# Patient Record
Sex: FEMALE | Race: WHITE | NOT HISPANIC OR LATINO | URBAN - METROPOLITAN AREA
[De-identification: names, ages, dates, MRNs, and addresses within clinical notes are randomized per-mention and may not be internally consistent; named-entity substitution may affect disease eponyms.]

---

## 2017-02-02 ENCOUNTER — OFFICE VISIT - RIVER FALLS (OUTPATIENT)
Dept: FAMILY MEDICINE | Facility: CLINIC | Age: 77
End: 2017-02-02

## 2017-02-02 ASSESSMENT — MIFFLIN-ST. JEOR: SCORE: 1365.84

## 2017-03-02 ENCOUNTER — OFFICE VISIT - RIVER FALLS (OUTPATIENT)
Dept: FAMILY MEDICINE | Facility: CLINIC | Age: 77
End: 2017-03-02

## 2017-04-28 ENCOUNTER — OFFICE VISIT - RIVER FALLS (OUTPATIENT)
Dept: FAMILY MEDICINE | Facility: CLINIC | Age: 77
End: 2017-04-28

## 2017-04-28 ENCOUNTER — COMMUNICATION - RIVER FALLS (OUTPATIENT)
Dept: FAMILY MEDICINE | Facility: CLINIC | Age: 77
End: 2017-04-28

## 2017-04-28 ASSESSMENT — MIFFLIN-ST. JEOR: SCORE: 1392.15

## 2017-04-29 LAB
CREAT SERPL-MCNC: 0.7 MG/DL (ref 0.6–0.93)
GLUCOSE BLD-MCNC: 184 MG/DL (ref 65–99)
HBA1C MFR BLD: 7.2 %

## 2017-05-02 ENCOUNTER — OFFICE VISIT - RIVER FALLS (OUTPATIENT)
Dept: FAMILY MEDICINE | Facility: CLINIC | Age: 77
End: 2017-05-02

## 2017-05-02 ASSESSMENT — MIFFLIN-ST. JEOR: SCORE: 1375.14

## 2017-06-01 ENCOUNTER — OFFICE VISIT - RIVER FALLS (OUTPATIENT)
Dept: FAMILY MEDICINE | Facility: CLINIC | Age: 77
End: 2017-06-01

## 2017-06-14 ENCOUNTER — COMMUNICATION - RIVER FALLS (OUTPATIENT)
Dept: FAMILY MEDICINE | Facility: CLINIC | Age: 77
End: 2017-06-14

## 2017-06-14 ENCOUNTER — OFFICE VISIT - RIVER FALLS (OUTPATIENT)
Dept: FAMILY MEDICINE | Facility: CLINIC | Age: 77
End: 2017-06-14

## 2017-06-14 ASSESSMENT — MIFFLIN-ST. JEOR: SCORE: 1392.15

## 2017-06-15 LAB
CREAT SERPL-MCNC: 0.68 MG/DL (ref 0.6–0.93)
GLUCOSE BLD-MCNC: 109 MG/DL (ref 65–99)

## 2017-09-12 ENCOUNTER — OFFICE VISIT - RIVER FALLS (OUTPATIENT)
Dept: FAMILY MEDICINE | Facility: CLINIC | Age: 77
End: 2017-09-12

## 2017-09-12 ASSESSMENT — MIFFLIN-ST. JEOR: SCORE: 1366.75

## 2017-12-11 ENCOUNTER — OFFICE VISIT - RIVER FALLS (OUTPATIENT)
Dept: FAMILY MEDICINE | Facility: CLINIC | Age: 77
End: 2017-12-11

## 2017-12-11 ASSESSMENT — MIFFLIN-ST. JEOR: SCORE: 1351.32

## 2017-12-12 LAB
CHOLEST SERPL-MCNC: 196 MG/DL
CHOLEST/HDLC SERPL: 3.8 {RATIO}
CREAT SERPL-MCNC: 0.68 MG/DL (ref 0.6–0.93)
GLUCOSE BLD-MCNC: 172 MG/DL (ref 65–99)
HBA1C MFR BLD: 7.2 %
HDLC SERPL-MCNC: 52 MG/DL
LDLC SERPL CALC-MCNC: 112 MG/DL
NONHDLC SERPL-MCNC: 144 MG/DL
TRIGL SERPL-MCNC: 202 MG/DL

## 2018-03-05 ENCOUNTER — OFFICE VISIT - RIVER FALLS (OUTPATIENT)
Dept: FAMILY MEDICINE | Facility: CLINIC | Age: 78
End: 2018-03-05

## 2018-03-05 ASSESSMENT — MIFFLIN-ST. JEOR: SCORE: 1360.4

## 2018-03-12 ENCOUNTER — OFFICE VISIT - RIVER FALLS (OUTPATIENT)
Dept: FAMILY MEDICINE | Facility: CLINIC | Age: 78
End: 2018-03-12

## 2018-03-12 ASSESSMENT — MIFFLIN-ST. JEOR: SCORE: 1367.65

## 2018-03-26 ENCOUNTER — OFFICE VISIT - RIVER FALLS (OUTPATIENT)
Dept: FAMILY MEDICINE | Facility: CLINIC | Age: 78
End: 2018-03-26

## 2018-03-26 ASSESSMENT — MIFFLIN-ST. JEOR: SCORE: 1346.79

## 2018-03-27 LAB
CREAT SERPL-MCNC: 0.7 MG/DL (ref 0.6–0.93)
GLUCOSE BLD-MCNC: 123 MG/DL (ref 65–99)

## 2018-04-13 ENCOUNTER — OFFICE VISIT - RIVER FALLS (OUTPATIENT)
Dept: FAMILY MEDICINE | Facility: CLINIC | Age: 78
End: 2018-04-13

## 2018-04-13 ASSESSMENT — MIFFLIN-ST. JEOR: SCORE: 1375.36

## 2018-06-06 ENCOUNTER — AMBULATORY - RIVER FALLS (OUTPATIENT)
Dept: FAMILY MEDICINE | Facility: CLINIC | Age: 78
End: 2018-06-06

## 2018-06-07 LAB
CHOLEST SERPL-MCNC: 158 MG/DL
CHOLEST/HDLC SERPL: 3.8 {RATIO}
HBA1C MFR BLD: 7.6 %
HDLC SERPL-MCNC: 42 MG/DL
LDLC SERPL CALC-MCNC: 89 MG/DL
NONHDLC SERPL-MCNC: 116 MG/DL
TRIGL SERPL-MCNC: 177 MG/DL

## 2018-06-15 ENCOUNTER — OFFICE VISIT - RIVER FALLS (OUTPATIENT)
Dept: FAMILY MEDICINE | Facility: CLINIC | Age: 78
End: 2018-06-15

## 2018-06-15 ASSESSMENT — MIFFLIN-ST. JEOR: SCORE: 1368.1

## 2018-06-16 LAB
CREAT SERPL-MCNC: 0.7 MG/DL (ref 0.6–0.93)
GLUCOSE BLD-MCNC: 143 MG/DL (ref 65–99)

## 2018-09-11 ENCOUNTER — AMBULATORY - RIVER FALLS (OUTPATIENT)
Dept: FAMILY MEDICINE | Facility: CLINIC | Age: 78
End: 2018-09-11

## 2018-09-12 LAB
CREAT SERPL-MCNC: 0.69 MG/DL (ref 0.6–0.93)
GLUCOSE BLD-MCNC: 116 MG/DL (ref 65–99)
HBA1C MFR BLD: 6.2 %

## 2018-09-18 ENCOUNTER — OFFICE VISIT - RIVER FALLS (OUTPATIENT)
Dept: FAMILY MEDICINE | Facility: CLINIC | Age: 78
End: 2018-09-18

## 2018-09-18 ASSESSMENT — MIFFLIN-ST. JEOR: SCORE: 1337.26

## 2018-10-18 ENCOUNTER — OFFICE VISIT - RIVER FALLS (OUTPATIENT)
Dept: FAMILY MEDICINE | Facility: CLINIC | Age: 78
End: 2018-10-18

## 2018-10-18 ASSESSMENT — MIFFLIN-ST. JEOR: SCORE: 1330

## 2019-01-15 ENCOUNTER — OFFICE VISIT - RIVER FALLS (OUTPATIENT)
Dept: FAMILY MEDICINE | Facility: CLINIC | Age: 79
End: 2019-01-15

## 2019-01-15 ASSESSMENT — MIFFLIN-ST. JEOR: SCORE: 1338.16

## 2019-01-16 LAB
A/G RATIO - HISTORICAL: 1.5 (ref 1–2.5)
ALBUMIN SERPL-MCNC: 4 GM/DL (ref 3.6–5.1)
ALP SERPL-CCNC: 66 UNIT/L (ref 33–130)
ALT SERPL W P-5'-P-CCNC: 29 UNIT/L (ref 6–29)
AST SERPL W P-5'-P-CCNC: 33 UNIT/L (ref 10–35)
BASOPHILS # BLD MANUAL: 32 10*3/UL (ref 0–200)
BASOPHILS NFR BLD MANUAL: 0.5 %
BILIRUB SERPL-MCNC: 0.8 MG/DL (ref 0.2–1.2)
BUN SERPL-MCNC: 21 MG/DL (ref 7–25)
BUN/CREAT RATIO - HISTORICAL: ABNORMAL (ref 6–22)
CALCIUM SERPL-MCNC: 9.4 MG/DL (ref 8.6–10.4)
CHLORIDE BLD-SCNC: 104 MMOL/L (ref 98–110)
CO2 SERPL-SCNC: 27 MMOL/L (ref 20–32)
CREAT SERPL-MCNC: 0.76 MG/DL (ref 0.6–0.93)
EGFRCR SERPLBLD CKD-EPI 2021: 76 ML/MIN/1.73M2
EOSINOPHIL # BLD MANUAL: 221 10*3/UL (ref 15–500)
EOSINOPHIL NFR BLD MANUAL: 3.5 %
ERYTHROCYTE [DISTWIDTH] IN BLOOD BY AUTOMATED COUNT: 12.2 % (ref 11–15)
GLOBULIN: 2.7 (ref 1.9–3.7)
GLUCOSE BLD-MCNC: 130 MG/DL (ref 65–99)
HBA1C MFR BLD: 6.8 %
HCT VFR BLD AUTO: 40.3 % (ref 35–45)
HGB BLD-MCNC: 13.5 GM/DL (ref 11.7–15.5)
LYMPHOCYTES # BLD MANUAL: 1909 10*3/UL (ref 850–3900)
LYMPHOCYTES NFR BLD MANUAL: 30.3 %
MCH RBC QN AUTO: 31.5 PG (ref 27–33)
MCHC RBC AUTO-ENTMCNC: 33.5 GM/DL (ref 32–36)
MCV RBC AUTO: 94.2 FL (ref 80–100)
MONOCYTES # BLD MANUAL: 548 10*3/UL (ref 200–950)
MONOCYTES NFR BLD MANUAL: 8.7 %
NEUTROPHILS # BLD MANUAL: 3591 10*3/UL (ref 1500–7800)
NEUTROPHILS NFR BLD MANUAL: 57 %
PLATELET # BLD AUTO: 155 10*3/UL (ref 140–400)
PMV BLD: 10.5 FL (ref 7.5–12.5)
POTASSIUM BLD-SCNC: 4.2 MMOL/L (ref 3.5–5.3)
PROT SERPL-MCNC: 6.7 GM/DL (ref 6.1–8.1)
RBC # BLD AUTO: 4.28 10*6/UL (ref 3.8–5.1)
SODIUM SERPL-SCNC: 140 MMOL/L (ref 135–146)
WBC # BLD AUTO: 6.3 10*3/UL (ref 3.8–10.8)

## 2019-03-12 ENCOUNTER — OFFICE VISIT - RIVER FALLS (OUTPATIENT)
Dept: FAMILY MEDICINE | Facility: CLINIC | Age: 79
End: 2019-03-12

## 2019-03-12 ASSESSMENT — MIFFLIN-ST. JEOR: SCORE: 1335.44

## 2019-04-04 ENCOUNTER — OFFICE VISIT - RIVER FALLS (OUTPATIENT)
Dept: FAMILY MEDICINE | Facility: CLINIC | Age: 79
End: 2019-04-04

## 2019-04-04 ASSESSMENT — MIFFLIN-ST. JEOR: SCORE: 1339.98

## 2019-04-12 ENCOUNTER — OFFICE VISIT - RIVER FALLS (OUTPATIENT)
Dept: FAMILY MEDICINE | Facility: CLINIC | Age: 79
End: 2019-04-12

## 2019-05-09 ENCOUNTER — OFFICE VISIT - RIVER FALLS (OUTPATIENT)
Dept: FAMILY MEDICINE | Facility: CLINIC | Age: 79
End: 2019-05-09

## 2019-05-09 ASSESSMENT — MIFFLIN-ST. JEOR: SCORE: 1322.74

## 2019-06-24 ENCOUNTER — COMMUNICATION - RIVER FALLS (OUTPATIENT)
Dept: FAMILY MEDICINE | Facility: CLINIC | Age: 79
End: 2019-06-24

## 2019-06-26 ENCOUNTER — AMBULATORY - RIVER FALLS (OUTPATIENT)
Dept: FAMILY MEDICINE | Facility: CLINIC | Age: 79
End: 2019-06-26

## 2019-06-27 ENCOUNTER — COMMUNICATION - RIVER FALLS (OUTPATIENT)
Dept: FAMILY MEDICINE | Facility: CLINIC | Age: 79
End: 2019-06-27

## 2019-06-27 LAB
CHOLEST SERPL-MCNC: 158 MG/DL
CHOLEST/HDLC SERPL: 3.7 {RATIO}
HBA1C MFR BLD: 6.7 %
HDLC SERPL-MCNC: 43 MG/DL
LDLC SERPL CALC-MCNC: 81 MG/DL
MICROALBUMIN UR-MCNC: 2.1 MG/DL
NONHDLC SERPL-MCNC: 115 MG/DL
TRIGL SERPL-MCNC: 243 MG/DL

## 2019-07-02 ENCOUNTER — OFFICE VISIT - RIVER FALLS (OUTPATIENT)
Dept: FAMILY MEDICINE | Facility: CLINIC | Age: 79
End: 2019-07-02

## 2019-07-02 ASSESSMENT — MIFFLIN-ST. JEOR: SCORE: 1341.79

## 2019-08-16 ENCOUNTER — OFFICE VISIT - RIVER FALLS (OUTPATIENT)
Dept: FAMILY MEDICINE | Facility: CLINIC | Age: 79
End: 2019-08-16

## 2019-08-16 ASSESSMENT — MIFFLIN-ST. JEOR: SCORE: 1336.35

## 2019-09-05 ENCOUNTER — OFFICE VISIT - RIVER FALLS (OUTPATIENT)
Dept: FAMILY MEDICINE | Facility: CLINIC | Age: 79
End: 2019-09-05

## 2019-09-12 ENCOUNTER — OFFICE VISIT - RIVER FALLS (OUTPATIENT)
Dept: FAMILY MEDICINE | Facility: CLINIC | Age: 79
End: 2019-09-12

## 2019-09-12 ASSESSMENT — MIFFLIN-ST. JEOR: SCORE: 1318.21

## 2019-09-19 ENCOUNTER — OFFICE VISIT - RIVER FALLS (OUTPATIENT)
Dept: FAMILY MEDICINE | Facility: CLINIC | Age: 79
End: 2019-09-19

## 2019-09-19 ASSESSMENT — MIFFLIN-ST. JEOR: SCORE: 1320.02

## 2019-11-08 ENCOUNTER — OFFICE VISIT - RIVER FALLS (OUTPATIENT)
Dept: FAMILY MEDICINE | Facility: CLINIC | Age: 79
End: 2019-11-08

## 2019-11-08 ASSESSMENT — MIFFLIN-ST. JEOR: SCORE: 1312.76

## 2019-11-09 LAB
BASOPHILS # BLD MANUAL: 68 10*3/UL (ref 0–200)
BASOPHILS NFR BLD MANUAL: 1.1 %
BUN SERPL-MCNC: 16 MG/DL (ref 7–25)
BUN/CREAT RATIO - HISTORICAL: NORMAL (ref 6–22)
CALCIUM SERPL-MCNC: 9.6 MG/DL (ref 8.6–10.4)
CHLORIDE BLD-SCNC: 107 MMOL/L (ref 98–110)
CHOLEST SERPL-MCNC: 274 MG/DL
CHOLEST/HDLC SERPL: 7.4 {RATIO}
CO2 SERPL-SCNC: 22 MMOL/L (ref 20–32)
CREAT SERPL-MCNC: 0.75 MG/DL (ref 0.6–0.93)
EGFRCR SERPLBLD CKD-EPI 2021: 77 ML/MIN/1.73M2
EOSINOPHIL # BLD MANUAL: 167 10*3/UL (ref 15–500)
EOSINOPHIL NFR BLD MANUAL: 2.7 %
ERYTHROCYTE [DISTWIDTH] IN BLOOD BY AUTOMATED COUNT: 12.3 % (ref 11–15)
GLUCOSE BLD-MCNC: 87 MG/DL (ref 65–99)
HBA1C MFR BLD: 6.4 %
HCT VFR BLD AUTO: 37.8 % (ref 35–45)
HDLC SERPL-MCNC: 37 MG/DL
HGB BLD-MCNC: 12.8 GM/DL (ref 11.7–15.5)
LDLC SERPL CALC-MCNC: 151 MG/DL
LDLC SERPL CALC-MCNC: ABNORMAL MG/DL
LYMPHOCYTES # BLD MANUAL: 2046 10*3/UL (ref 850–3900)
LYMPHOCYTES NFR BLD MANUAL: 33 %
MCH RBC QN AUTO: 31.4 PG (ref 27–33)
MCHC RBC AUTO-ENTMCNC: 33.9 GM/DL (ref 32–36)
MCV RBC AUTO: 92.6 FL (ref 80–100)
MONOCYTES # BLD MANUAL: 502 10*3/UL (ref 200–950)
MONOCYTES NFR BLD MANUAL: 8.1 %
NEUTROPHILS # BLD MANUAL: 3416 10*3/UL (ref 1500–7800)
NEUTROPHILS NFR BLD MANUAL: 55.1 %
NONHDLC SERPL-MCNC: 237 MG/DL
PLATELET # BLD AUTO: 174 10*3/UL (ref 140–400)
PMV BLD: 10.3 FL (ref 7.5–12.5)
POTASSIUM BLD-SCNC: 4.3 MMOL/L (ref 3.5–5.3)
RBC # BLD AUTO: 4.08 10*6/UL (ref 3.8–5.1)
SODIUM SERPL-SCNC: 139 MMOL/L (ref 135–146)
TRIGL SERPL-MCNC: 559 MG/DL
WBC # BLD AUTO: 6.2 10*3/UL (ref 3.8–10.8)

## 2019-11-11 ENCOUNTER — COMMUNICATION - RIVER FALLS (OUTPATIENT)
Dept: FAMILY MEDICINE | Facility: CLINIC | Age: 79
End: 2019-11-11

## 2020-01-20 ENCOUNTER — OFFICE VISIT - RIVER FALLS (OUTPATIENT)
Dept: FAMILY MEDICINE | Facility: CLINIC | Age: 80
End: 2020-01-20

## 2020-01-20 ASSESSMENT — MIFFLIN-ST. JEOR: SCORE: 1299.15

## 2020-07-14 ENCOUNTER — OFFICE VISIT - RIVER FALLS (OUTPATIENT)
Dept: FAMILY MEDICINE | Facility: CLINIC | Age: 80
End: 2020-07-14

## 2020-10-28 ENCOUNTER — COMMUNICATION - RIVER FALLS (OUTPATIENT)
Dept: FAMILY MEDICINE | Facility: CLINIC | Age: 80
End: 2020-10-28

## 2020-10-28 ENCOUNTER — OFFICE VISIT - RIVER FALLS (OUTPATIENT)
Dept: FAMILY MEDICINE | Facility: CLINIC | Age: 80
End: 2020-10-28

## 2020-10-28 LAB
ALBUMIN UR-MCNC: NEGATIVE G/DL
BACTERIA #/AREA URNS HPF: NORMAL /[HPF]
BILIRUB UR QL STRIP: NEGATIVE
EPITHELIAL CELLS UR: NORMAL
GLUCOSE UR STRIP-MCNC: NEGATIVE MG/DL
HGB UR QL STRIP: ABNORMAL
KETONES UR STRIP-MCNC: NEGATIVE MG/DL
LEUKOCYTE ESTERASE UR QL STRIP: NEGATIVE
NITRATE UR QL: NEGATIVE
PH UR STRIP: ABNORMAL [PH] (ref 5–8)
RBC #/AREA URNS AUTO: NORMAL /[HPF]
SP GR UR STRIP: 1.02 (ref 1–1.03)
WBC #/AREA URNS AUTO: NORMAL /[HPF]

## 2020-10-29 ENCOUNTER — COMMUNICATION - RIVER FALLS (OUTPATIENT)
Dept: FAMILY MEDICINE | Facility: CLINIC | Age: 80
End: 2020-10-29

## 2020-10-29 LAB
A/G RATIO - HISTORICAL: 1.7 (ref 1–2.5)
ALBUMIN SERPL-MCNC: 4.3 GM/DL (ref 3.6–5.1)
ALP SERPL-CCNC: 103 UNIT/L (ref 37–153)
ALT SERPL W P-5'-P-CCNC: 31 UNIT/L (ref 6–29)
AST SERPL W P-5'-P-CCNC: 32 UNIT/L (ref 10–35)
BILIRUB SERPL-MCNC: 0.6 MG/DL (ref 0.2–1.2)
BUN SERPL-MCNC: 15 MG/DL (ref 7–25)
BUN/CREAT RATIO - HISTORICAL: ABNORMAL (ref 6–22)
CALCIUM SERPL-MCNC: 9.8 MG/DL (ref 8.6–10.4)
CHLORIDE BLD-SCNC: 105 MMOL/L (ref 98–110)
CHOLEST SERPL-MCNC: 171 MG/DL
CHOLEST/HDLC SERPL: 3.5 {RATIO}
CO2 SERPL-SCNC: 26 MMOL/L (ref 20–32)
CREAT SERPL-MCNC: 0.74 MG/DL (ref 0.6–0.93)
EGFRCR SERPLBLD CKD-EPI 2021: 78 ML/MIN/1.73M2
ERYTHROCYTE [DISTWIDTH] IN BLOOD BY AUTOMATED COUNT: 12.3 % (ref 11–15)
GLOBULIN: 2.5 (ref 1.9–3.7)
GLUCOSE BLD-MCNC: 110 MG/DL (ref 65–99)
HBA1C MFR BLD: 6.4 %
HCT VFR BLD AUTO: 39.2 % (ref 35–45)
HDLC SERPL-MCNC: 49 MG/DL
HGB BLD-MCNC: 13.4 GM/DL (ref 11.7–15.5)
LDLC SERPL CALC-MCNC: 91 MG/DL
MCH RBC QN AUTO: 31.6 PG (ref 27–33)
MCHC RBC AUTO-ENTMCNC: 34.2 GM/DL (ref 32–36)
MCV RBC AUTO: 92.5 FL (ref 80–100)
NONHDLC SERPL-MCNC: 122 MG/DL
PLATELET # BLD AUTO: 177 10*3/UL (ref 140–400)
PMV BLD: 10.2 FL (ref 7.5–12.5)
POTASSIUM BLD-SCNC: 4.1 MMOL/L (ref 3.5–5.3)
PROT SERPL-MCNC: 6.8 GM/DL (ref 6.1–8.1)
RBC # BLD AUTO: 4.24 10*6/UL (ref 3.8–5.1)
SODIUM SERPL-SCNC: 140 MMOL/L (ref 135–146)
TRIGL SERPL-MCNC: 222 MG/DL
TSH SERPL DL<=0.005 MIU/L-ACNC: 1.08 MIU/L (ref 0.4–4.5)
WBC # BLD AUTO: 5.4 10*3/UL (ref 3.8–10.8)

## 2020-12-09 ENCOUNTER — OFFICE VISIT - RIVER FALLS (OUTPATIENT)
Dept: FAMILY MEDICINE | Facility: CLINIC | Age: 80
End: 2020-12-09

## 2020-12-09 LAB
ALBUMIN UR-MCNC: NEGATIVE G/DL
BACTERIA #/AREA URNS HPF: NORMAL /[HPF]
BILIRUB UR QL STRIP: NEGATIVE
EPITHELIAL CELLS UR: NORMAL
GLUCOSE UR STRIP-MCNC: NEGATIVE MG/DL
HGB UR QL STRIP: ABNORMAL
KETONES UR STRIP-MCNC: NEGATIVE MG/DL
LEUKOCYTE ESTERASE UR QL STRIP: NEGATIVE
NITRATE UR QL: NEGATIVE
PH UR STRIP: 7 [PH] (ref 5–8)
RBC #/AREA URNS AUTO: NORMAL /[HPF]
SP GR UR STRIP: 1.02 (ref 1–1.03)
WBC #/AREA URNS AUTO: NORMAL /[HPF]

## 2020-12-09 ASSESSMENT — MIFFLIN-ST. JEOR: SCORE: 1312.76

## 2021-06-15 ENCOUNTER — COMMUNICATION - RIVER FALLS (OUTPATIENT)
Dept: FAMILY MEDICINE | Facility: CLINIC | Age: 81
End: 2021-06-15

## 2021-06-17 ENCOUNTER — COMMUNICATION - RIVER FALLS (OUTPATIENT)
Dept: FAMILY MEDICINE | Facility: CLINIC | Age: 81
End: 2021-06-17

## 2022-02-12 VITALS
TEMPERATURE: 97.3 F | DIASTOLIC BLOOD PRESSURE: 70 MMHG | HEIGHT: 63 IN | TEMPERATURE: 97.1 F | WEIGHT: 199 LBS | TEMPERATURE: 97.3 F | WEIGHT: 203.6 LBS | SYSTOLIC BLOOD PRESSURE: 118 MMHG | WEIGHT: 202 LBS | BODY MASS INDEX: 36.07 KG/M2 | DIASTOLIC BLOOD PRESSURE: 70 MMHG | TEMPERATURE: 97.6 F | BODY MASS INDEX: 34.45 KG/M2 | WEIGHT: 201.8 LBS | HEIGHT: 64 IN | SYSTOLIC BLOOD PRESSURE: 122 MMHG | HEART RATE: 68 BPM | DIASTOLIC BLOOD PRESSURE: 70 MMHG | HEART RATE: 72 BPM | SYSTOLIC BLOOD PRESSURE: 132 MMHG | HEART RATE: 68 BPM | SYSTOLIC BLOOD PRESSURE: 128 MMHG | DIASTOLIC BLOOD PRESSURE: 64 MMHG | BODY MASS INDEX: 35.26 KG/M2 | BODY MASS INDEX: 35.79 KG/M2 | HEIGHT: 63 IN | HEART RATE: 68 BPM | HEIGHT: 63 IN

## 2022-02-12 VITALS
HEART RATE: 80 BPM | BODY MASS INDEX: 36.37 KG/M2 | TEMPERATURE: 98.9 F | WEIGHT: 209 LBS | HEART RATE: 76 BPM | TEMPERATURE: 98.3 F | DIASTOLIC BLOOD PRESSURE: 64 MMHG | SYSTOLIC BLOOD PRESSURE: 126 MMHG | HEIGHT: 63 IN | BODY MASS INDEX: 37.03 KG/M2 | WEIGHT: 205.25 LBS | DIASTOLIC BLOOD PRESSURE: 72 MMHG | SYSTOLIC BLOOD PRESSURE: 110 MMHG | HEIGHT: 63 IN

## 2022-02-12 VITALS
TEMPERATURE: 98.6 F | HEART RATE: 80 BPM | WEIGHT: 190.2 LBS | WEIGHT: 194 LBS | HEART RATE: 64 BPM | HEIGHT: 64 IN | DIASTOLIC BLOOD PRESSURE: 70 MMHG | TEMPERATURE: 98 F | BODY MASS INDEX: 33.3 KG/M2 | HEART RATE: 64 BPM | SYSTOLIC BLOOD PRESSURE: 118 MMHG | SYSTOLIC BLOOD PRESSURE: 122 MMHG | BODY MASS INDEX: 32.47 KG/M2 | BODY MASS INDEX: 32.95 KG/M2 | DIASTOLIC BLOOD PRESSURE: 60 MMHG | TEMPERATURE: 98.8 F | HEART RATE: 68 BPM | HEIGHT: 64 IN | WEIGHT: 193 LBS | BODY MASS INDEX: 33.12 KG/M2 | DIASTOLIC BLOOD PRESSURE: 58 MMHG | SYSTOLIC BLOOD PRESSURE: 112 MMHG | SYSTOLIC BLOOD PRESSURE: 118 MMHG | HEIGHT: 64 IN | TEMPERATURE: 96.3 F | OXYGEN SATURATION: 95 % | DIASTOLIC BLOOD PRESSURE: 66 MMHG | HEIGHT: 64 IN

## 2022-02-12 VITALS
HEART RATE: 66 BPM | DIASTOLIC BLOOD PRESSURE: 70 MMHG | SYSTOLIC BLOOD PRESSURE: 122 MMHG | HEART RATE: 64 BPM | HEIGHT: 64 IN | OXYGEN SATURATION: 95 % | SYSTOLIC BLOOD PRESSURE: 126 MMHG | DIASTOLIC BLOOD PRESSURE: 60 MMHG | SYSTOLIC BLOOD PRESSURE: 134 MMHG | HEART RATE: 76 BPM | DIASTOLIC BLOOD PRESSURE: 74 MMHG | TEMPERATURE: 98.3 F | WEIGHT: 189.6 LBS | DIASTOLIC BLOOD PRESSURE: 70 MMHG | WEIGHT: 188 LBS | HEIGHT: 64 IN | BODY MASS INDEX: 32.3 KG/M2 | OXYGEN SATURATION: 97 % | TEMPERATURE: 97.9 F | BODY MASS INDEX: 32.1 KG/M2 | TEMPERATURE: 98.4 F | OXYGEN SATURATION: 97 % | SYSTOLIC BLOOD PRESSURE: 118 MMHG | HEART RATE: 70 BPM | HEIGHT: 64 IN | BODY MASS INDEX: 32.37 KG/M2 | TEMPERATURE: 98 F | WEIGHT: 189.2 LBS

## 2022-02-12 VITALS
DIASTOLIC BLOOD PRESSURE: 64 MMHG | WEIGHT: 203.2 LBS | HEART RATE: 76 BPM | TEMPERATURE: 97.6 F | HEIGHT: 63 IN | BODY MASS INDEX: 36 KG/M2 | SYSTOLIC BLOOD PRESSURE: 112 MMHG

## 2022-02-12 VITALS
TEMPERATURE: 97.2 F | SYSTOLIC BLOOD PRESSURE: 110 MMHG | WEIGHT: 191.8 LBS | SYSTOLIC BLOOD PRESSURE: 100 MMHG | DIASTOLIC BLOOD PRESSURE: 60 MMHG | BODY MASS INDEX: 32.74 KG/M2 | BODY MASS INDEX: 33.02 KG/M2 | HEIGHT: 64 IN | TEMPERATURE: 96.8 F | DIASTOLIC BLOOD PRESSURE: 60 MMHG | WEIGHT: 193.4 LBS | HEART RATE: 66 BPM | HEIGHT: 64 IN | HEART RATE: 80 BPM

## 2022-02-12 VITALS
SYSTOLIC BLOOD PRESSURE: 134 MMHG | HEART RATE: 72 BPM | OXYGEN SATURATION: 96 % | DIASTOLIC BLOOD PRESSURE: 78 MMHG | BODY MASS INDEX: 31.76 KG/M2 | TEMPERATURE: 97.9 F | HEIGHT: 64 IN

## 2022-02-12 VITALS
HEIGHT: 63 IN | SYSTOLIC BLOOD PRESSURE: 114 MMHG | WEIGHT: 203.4 LBS | DIASTOLIC BLOOD PRESSURE: 80 MMHG | HEART RATE: 74 BPM | OXYGEN SATURATION: 98 % | BODY MASS INDEX: 36.04 KG/M2

## 2022-02-12 VITALS
WEIGHT: 193.6 LBS | TEMPERATURE: 97.6 F | DIASTOLIC BLOOD PRESSURE: 68 MMHG | SYSTOLIC BLOOD PRESSURE: 124 MMHG | HEIGHT: 64 IN | BODY MASS INDEX: 33.05 KG/M2 | HEART RATE: 72 BPM

## 2022-02-12 VITALS — BODY MASS INDEX: 34.36 KG/M2 | HEIGHT: 64 IN

## 2022-02-12 VITALS
WEIGHT: 194.4 LBS | HEART RATE: 72 BPM | OXYGEN SATURATION: 98 % | DIASTOLIC BLOOD PRESSURE: 64 MMHG | WEIGHT: 193.2 LBS | HEIGHT: 64 IN | SYSTOLIC BLOOD PRESSURE: 114 MMHG | TEMPERATURE: 99 F | SYSTOLIC BLOOD PRESSURE: 126 MMHG | HEART RATE: 73 BPM | DIASTOLIC BLOOD PRESSURE: 60 MMHG | BODY MASS INDEX: 33.19 KG/M2 | HEIGHT: 64 IN | BODY MASS INDEX: 32.98 KG/M2

## 2022-02-12 VITALS
HEIGHT: 63 IN | OXYGEN SATURATION: 96 % | BODY MASS INDEX: 35.44 KG/M2 | DIASTOLIC BLOOD PRESSURE: 78 MMHG | SYSTOLIC BLOOD PRESSURE: 116 MMHG | WEIGHT: 200 LBS | HEART RATE: 80 BPM

## 2022-02-12 VITALS
OXYGEN SATURATION: 96 % | DIASTOLIC BLOOD PRESSURE: 62 MMHG | TEMPERATURE: 97.4 F | SYSTOLIC BLOOD PRESSURE: 102 MMHG | WEIGHT: 188 LBS | BODY MASS INDEX: 32.1 KG/M2 | HEIGHT: 64 IN | HEART RATE: 81 BPM

## 2022-02-12 VITALS
SYSTOLIC BLOOD PRESSURE: 122 MMHG | OXYGEN SATURATION: 97 % | HEIGHT: 64 IN | BODY MASS INDEX: 31.58 KG/M2 | HEART RATE: 72 BPM | DIASTOLIC BLOOD PRESSURE: 72 MMHG | WEIGHT: 185 LBS

## 2022-02-12 VITALS
BODY MASS INDEX: 37.03 KG/M2 | SYSTOLIC BLOOD PRESSURE: 110 MMHG | DIASTOLIC BLOOD PRESSURE: 60 MMHG | HEART RATE: 64 BPM | HEIGHT: 63 IN | TEMPERATURE: 98 F | WEIGHT: 209 LBS

## 2022-02-12 VITALS
WEIGHT: 200.2 LBS | DIASTOLIC BLOOD PRESSURE: 60 MMHG | SYSTOLIC BLOOD PRESSURE: 120 MMHG | HEART RATE: 100 BPM | BODY MASS INDEX: 34.18 KG/M2 | HEIGHT: 64 IN

## 2022-02-15 NOTE — PROGRESS NOTES
Patient:   PAKO BARBER            MRN: [id number]            FIN: [id number]               Age:   76 years     Sex:  Female     :  1940   Associated Diagnoses:   Leg edema; Diabetes Mellitus; CVA, old, cognitive deficits; Chronic Venous Insufficiency   Author:   Jan York PA-C      Visit Information      Date of Service: 2017 12:50 pm  Performing Location: [facility]  Encounter#: [id number]   Visit type:  General concerns.    Accompanied by:  No one.    Source of history:  Self.    Referral source:  Self.    History limitation:  None.       Chief Complaint   2017 1:01 PM CDT    Left heel pain and swelling        History of Present Illness             The patient presents with peripheral edema.  The location of peripheral edema is bilateral, the lower extremity.  The peripheral edema is characterized by shiny skin appearance and tight sensation of affected area.  The severity of the peripheral edema is moderate.  The peripheral edema is episodic, fluctuates in intensity and is worsening.  Chronic problems, Worsening as of late. Has been on diuretics previously. No SOB. No chest pain. No orthopnea. Pain in left heel. CC above noted and confirmed with the patient. Wears knee high support stockings..        Review of Systems   Constitutional:  Negative.    Cardiovascular:  Negative except as documented in history of present illness.    Musculoskeletal:  Negative.    Integumentary:  Negative except as documented in history of present illness.       Health Status   Allergies:    Allergic Reactions (All)  Severity Not Documented  Latex (No reactions were documented)  Opthmolic eye drops (Swelling of eye..)  Vicodin (Behavioral disturbances)  Zocor (Diarrhea)   Medications:  (Selected)   Prescriptions  Prescribed  Ditropan XL 10 mg/24 hr oral tablet, extended release: 1 tab(s) ( 10 mg ), PO, Daily, # 90 tab(s), 0 Refill(s), Type: Maintenance, Pharmacy: [facility] #[id number], 1 tab(s)  po daily  Lasix 20 mg oral tablet: 1 tab(s) ( 20 mg ), PO, Daily, # 30 tab(s), 0 Refill(s), Type: Maintenance, Pharmacy: [facility] #[id number], 1 tab(s) po daily  Lipitor 80 mg oral tablet: 1 tab(s) ( 80 mg ), PO, Daily, # 90 tab(s), 3 Refill(s), Type: Maintenance, Pharmacy: [facility] #[id number], 1 tab(s) po daily  nystatin 100,000 units/g topical cream: 1 jonathan, TOP, TID, # 30 g, 2 Refill(s), Type: Maintenance, Pharmacy: [facility] #[id number], 1 jonathan top tid  Documented Medications  Documented  Daily Multiple Vitamins: PO, Daily, 0  Tylenol: PO, PRN: as needed for pain, 0 Refill(s), Type: Maintenance  Vitamin D3 1000 intl units oral capsule: 1 cap(s) ( 1,000 International Unit ), PO, daily, 0 Refill(s), Type: Soft Stop  aspirin 81 mg oral tablet: 1 tab(s) ( 81 mg ), PO, Daily, # 30 tab(s), 0 Refill(s), Type: Maintenance   Problem list:    All Problems (Selected)  Hyperplastic Colonic Polyp / SNOMED CT 025049709 / Confirmed  Buttock Pain / ICD-9-.1 / Confirmed  Chronic Venous Insufficiency / ICD-9-.81 / Confirmed  CVA, old, cognitive deficits / ICD-9-.0 / Confirmed  Diabetes Mellitus / ICD-9-.00 / Confirmed  EDEMA / ICD-9-.3 / Confirmed  Metabolic Syndrome / ICD-9-.7 / Confirmed  Mixed hyperlipidemia / SNOMED CT 028981698 / Confirmed  OA (Osteoarthritis) / ICD-9-.90 / Confirmed  Obesity / ICD-9-.00 / Probable  Osteoporosis / ICD-9-.00 / Confirmed  Polio / ICD-9-.90 / Confirmed  Varicose Veins of Legs / ICD-9-.9 / Confirmed      Histories   Past Medical History:    Active  Diabetes Mellitus (250.00): Onset on 5/1/2007 at 66 years.  Comments:  5/8/2013 CDT 11:12 AM CDT - Michelle Zhao  Type 2  Hyperplastic Colonic Polyp (298190745): Onset in 2004 at 64 years.  Buttock Pain (729.1): Onset in 2004 at 63 years.  Comments:  5/8/2013 CDT 11:24 AM CDT - Michelle Zhao  Work-related fall.  Chronic left buttock pain since then.  OA (Osteoarthritis)  (715.90)  Osteoporosis (733.00)  EDEMA (782.3)  Metabolic Syndrome (277.7)  Polio (045.90)  Chronic Venous Insufficiency (459.81)  Varicose Veins of Legs (454.9)  Resolved  Fracture of Wrist (814.00): Onset in 2004 at 63 years.  Resolved.  Comments:  5/8/2013 CDT 11:22 AM Michelle Preston  Left    5/8/2013 CDT 11:25 AM Michelle Preston  Work-related fall.  Stroke (436): Onset on 7/14/2003 at 63 years.  Resolved.  Comments:  5/8/2013 CDT 12:03 PM Michelle Preston  Acute CVA with right-sided weakness.  Tobacco user (305.1):  Resolved.  Comments:  5/8/2013 CDT 11:46 AM Michelle Preston  Patient states that she quit in 1989.   Procedure history:    Extracapsular cataract extraction and insertion of intraocular lens (490244511) on 9/15/2016 at 76 Years.  Comments:  10/10/2016 1:01 PM - Rachel Ivan.  Extracapsular cataract extraction and insertion of intraocular lens (677120438) on 9/1/2016 at 76 Years.  Comments:  9/8/2016 2:37 PM - Rachel Ivan.  Colonoscopy (260604353) on 6/25/2014 at 74 Years.  Comments:  6/25/2014 9:16 AM - Demond Knight MD  Moderate left diberticulosis. No polyps. Repeat 10 years.  Sclerotherapy of vein of lower limb (1615816668) in 2010 at 70 Years.  Comments:  5/8/2013 11:40 Michelle Kauffman  Left leg in 03/2010.  Right leg in 04/2010.  [facility].  Laser ablation of long saphenous vein (6378792654) on 8/3/2009 at 69 Years.  Comments:  5/8/2013 11:45 Michelle Kauffman  Right great and right accessory saphenous veins.  Fluoroscopically-guided needle placement for injection of ischial bursa on the left. on 10/10/2008 at 68 Years.  Comments:  5/8/2013 11:49 Michelle Kauffman  [facility].  Bone density scan (998167551) on 5/31/2007 at 67 Years.  Comments:  5/8/2013 11:58 Michelle Kauffman  Osteopenia  Fracture of Wrist (814.00) in 2004 at 64 Years.  Comments:  5/8/2013 11:54 Michelle aKuffman  Left  Colonoscopy ([id number]) on 4/1/2004 at 63  Years.  Comments:  2011 7:35 AM - Marzena Rahcel  Repeat in 10 yrs.    10/4/2010 11:58 AM - Antonia DAMON, Demond  diverticulosis. single hyperplastic rectal polyp  Bone density scan (808187395) in  at 62 Years.  Arthroscopy of knee (645138324) in  at 60 Years.  Comments:  2013 12:08 PM - Michelle Zhao  Left knee.  Flexible sigmoidoscopy (86853594) on 1999 at 59 Years.  Arthroscopy of Knee (80.26) in  at 58 Years.   section (39602848).  Comments:  2013 11:52 AM - Michelle Zhao  1960's    2010 12:42 PM - Mila Brush   3, Para 3   Social History:        Alcohol Assessment: Denies Alcohol Use            Never      Tobacco Assessment: Past            Past, Cigarettes, 15 per day.  Started age 19 Years.  Stopped age 44 Years.      Substance Abuse Assessment: Denies Substance Abuse      Employment and Education Assessment            Retired      Home and Environment Assessment            Marital status: .  3 children.      Other Assessment            Marital status,         Physical Examination   Vital Signs   2017 1:01 PM CDT Temperature Tympanic 98.9 DegF    Peripheral Pulse Rate 76 bpm    Pulse Site Radial artery    HR Method Manual    Systolic Blood Pressure 110 mmHg    Diastolic Blood Pressure 72 mmHg    Mean Arterial Pressure 85 mmHg    BP Site Right arm    BP Method Manual      Measurements from flowsheet : Measurements   2017 1:01 PM CDT Height Measured - Standard 63 in    Weight Measured - Standard 209.0 lb    BSA 2.05 m2    Body Mass Index 37.02 kg/m2      Respiratory:  Lungs are clear to auscultation, Respirations are non-labored, Breath sounds are equal.    Cardiovascular:  Normal rate, Regular rhythm, No murmur.         Edema: Bilateral, Lower extremity, 3+, Pitting.    Musculoskeletal:  Normal range of motion, Tender at left heel. .    Integumentary:  Skin intact. Not weeping. Skin at heel intact. .       Impression and Plan      Diagnosis    Leg edema (JCF51-XP R60.0)   Diabetes Mellitus (EXA56-FR E11.9)   CVA, old, cognitive deficits (EIG61-NO I69.31)   Chronic Venous Insufficiency (PVC54-MO I87.2)   Patient Instructions:       Counseled: Patient, Regarding diagnosis, Regarding treatment, Regarding medications, Verbalized understanding.    Orders     Orders (Selected)   Outpatient Orders  Ordered  Basic Metabolic Panel (Request): Leg edema  Diabetes Mellitus  CVA, old, cognitive deficits  Chronic Venous Insufficiency  CBC w/o Diff (Request): Leg edema  Diabetes Mellitus  CVA, old, cognitive deficits  Chronic Venous Insufficiency  Hemoglobin A1c (Request): Diabetes Mellitus  Prescriptions  Prescribed  Lasix 20 mg oral tablet: 1 tab(s) ( 20 mg ), PO, Daily, # 30 tab(s), 0 Refill(s), Type: Maintenance, Pharmacy: [facility] #[id number], 1 tab(s) po daily.     Continue support hose. Will recheck  here early next week. May need xray of left foot if not better. Unable to xray today.

## 2022-02-15 NOTE — NURSING NOTE
CAGE Assessment Entered On:  1/20/2020 10:31 AM CST    Performed On:  1/20/2020 10:30 AM CST by Kusum Alexander CMA               Assessment   Have you ever felt you should cut down on your drinking :   No   Have people annoyed you by criticizing your drinking :   No   Have you ever felt bad or guilty about your drinking :   No   Have you ever taken a drink first thing in the morning to steady your nerves or get rid of a hangover (Eye-opener) :   No   CAGE Score :   0    Kusum Alexander CMA - 1/20/2020 10:30 AM CST

## 2022-02-15 NOTE — TELEPHONE ENCOUNTER
Entered by Tina Saez CMA on November 16, 2020 10:36:13 AM CST  ---------------------  From: Tina Saez CMA   To: [facility]    Sent: 11/16/2020 10:36:13 AM CST  Subject: Medication Management     ** Submitted: **  Order:furosemide (furosemide 40 mg oral tablet)  1 tab(s)  Oral  daily  Qty:  90 tab(s)        Days Supply:  30        Refills:  1          Substitutions Allowed     Route To [facility]    Signed by Tina Saez CMA  11/16/2020 4:36:00 PM UT    ** Submitted: **  Complete:furosemide (Lasix 40 mg oral tablet)   Signed by Tina Saez CMA  11/16/2020 4:36:00 PM UT    ** Not Approved:  **  furosemide (furosemide 40 mg tablet)  TAKE ONE TABLET BY MOUTH DAILY  Qty:  90 tab(s)        Days Supply:  30        Refills:  3          Substitutions Allowed     Route To [facility]   Signed by Tina Saez CMA            ** Submitted: **  Order:metFORMIN (metFORMIN 500 mg oral tablet)  1 tab(s)  Oral  bid  Qty:  180 tab(s)        Days Supply:  30        Refills:  1          Substitutions Allowed     Route To [facility]    Signed by Tina Saez CMA  11/16/2020 4:35:00 PM UTC    ** Submitted: **  Complete:metFORMIN (metFORMIN 500 mg oral tablet)   Signed by Tina Saez CMA  11/16/2020 4:35:00 PM UT    ** Not Approved:  **  metFORMIN (metformin 500 mg tablet)  TAKE ONE TABLET BY MOUTH TWICE DAILY  Qty:  180 tab(s)        Days Supply:  30        Refills:  3          Substitutions Allowed     Route To [facility]   Signed by Tina Saez CMA            ** Not Approved: Refill not appropriate, duplicate **  metFORMIN (metformin 500 mg tablet)  TAKE ONE TABLET BY MOUTH TWICE  DAILY  Qty:  180 tab(s)        Days Supply:  30        Refills:  3          Substitutions Allowed     Route To Pharmacy - [facility]   Signed by Tina Saez CMA            ------------------------------------------  From: [facility]  To: Negrito Abdul MD  Sent: November 16, 2020 9:34:34 AM CST  Subject: Medication Management  Due: November 7, 2020 12:21:51 AM CST     ** On Hold Pending Signature **     Drug: metFORMIN (metFORMIN 500 mg oral tablet), 1 tab(s) Oral bid  Quantity: 180 tab(s)  Days Supply: 0  Refills: 2  Substitutions Allowed  Notes from Pharmacy:     Dispensed Drug: metFORMIN (metFORMIN 500 mg oral tablet), TAKE ONE TABLET BY MOUTH TWICE DAILY  Quantity: 180 tab(s)  Days Supply: 30  Refills: 3  Substitutions Allowed  Notes from Pharmacy:     ** On Hold Pending Signature **     Drug: metFORMIN (metFORMIN 500 mg oral tablet), 1 tab(s) Oral bid  Quantity: 180 tab(s)  Days Supply: 0  Refills: 2  Substitutions Allowed  Notes from Pharmacy:     Dispensed Drug: metFORMIN (metFORMIN 500 mg oral tablet), TAKE ONE TABLET BY MOUTH TWICE DAILY  Quantity: 180 tab(s)  Days Supply: 30  Refills: 3  Substitutions Allowed  Notes from Pharmacy:     ** On Hold Pending Signature **     Drug: furosemide (Lasix 40 mg oral tablet), 1 tab(s) Oral daily  Quantity: 90 tab(s)  Days Supply: 0  Refills: 2  Substitutions Allowed  Notes from Pharmacy:     Dispensed Drug: furosemide (furosemide 40 mg oral tablet), TAKE ONE TABLET BY MOUTH DAILY  Quantity: 90 tab(s)  Days Supply: 30  Refills: 3  Substitutions Allowed  Notes from Pharmacy:  ------------------------------------------10/28/20 [facility]

## 2022-02-15 NOTE — TELEPHONE ENCOUNTER
---------------------  From: Karma Cardenas (Phone Messages Pool ([id number]))   To: Richard Conde MD;     Sent: 12/16/2020 5:55:00 PM CST  Subject: Home Health Leg Wrap Order Request       PCP:   AFSHAN        Time of Call:  1734       Person Calling:  pt  Phone number:  [phone]    Returned call at: 1750    Note:   Pt is having problems with very sore and swollen right leg. Was told she would qualify to have home health nurse come wrap her leg for her if she has an order from her PCP. Please advise.    Last office visit and reason:  12/9/20 upper extremity pain, urinary symptomsI updated my note from last week - we had discussed but I hadn't included as I had not changed her current plan.  I'm not sure if home health could come frequently enough, but I am open to her discussing with home health.  OK for referral.---------------------  From: Richard Conde MD   To: [employer] Message Pool ([id number]);     Sent: 12/17/2020 1:56:35 PM CST  Subject: FW: Home Health Leg Wrap Order RequestI will place order-I know we are having a difficult time w/ home health agency's accepting edema management and wound care (probably d/t limited staff). We will see if we can get her enrolled. Other option would be edema management through PT.---------------------  From: Rocío Lyles CMA ([employer] Message Pool ([id number]_[employer]))   To: Referral Coordinators Pool ([id number]_[facility]);     Cc: Richard Conde MD;      Sent: 12/17/2020 2:01:34 PM CST  Subject: RE: Home Health Leg Wrap Order RequestYou can run the idea of PT by her - she is currently seeing PT for her shoulder, so I'm not sure if that is who told her that home health could help.thank you!!---------------------  From: Richard Conde MD   To: [email or web address] Message Pool ([id number]_[place]);     Sent: 12/17/2020 2:05:27 PM CST  Subject: RE: Home Health Leg Wrap Order Request---------------------  From: Parisa Clemons (Referral Coordinators Pool  ([id number]_[place]   To: [employer] Message Pool ([id number]_[place]);     Sent: 12/17/2020 3:39:05 PM CST  Subject: RE: Home Health Leg Wrap Order Request     Noted, did you talk with PT about if they are able to do?Not yet, I did order this back in Oct but I dont think she pursed with PT at that time. See about home care first-she is not able to drive by herself so would be easier if they will enroll her.     Thanks!---------------------  From: Rocío Lyles CMA ([employer] Message Pool ([id number]_[place]   To: Referral Coordinators Pool ([id number]_[place]);     Sent: 12/17/2020 4:10:21 PM CST  Subject: RE: Home Health Leg Wrap Order Request

## 2022-02-15 NOTE — TELEPHONE ENCOUNTER
---------------------  From: Judy Higgins LPN (Phone Messages Pool ([id number]_[facility]))   Sent: 9/18/2020 12:40:54 PM CDT  Subject: nystatin     Phone Message    PCP:   AFSHAN      Time of Call:  12:00pm       Person Calling:  pt  Phone number:  [phone]    Returned call at: 12:38pm    Note:   Pt LM stating she will be out of her medication today. Pt says she needs a refill on the nystatin.    Returned call and gave pt pharmacy phone number. Rx was sent 8/19/20 #30gm with 5 refill    Last office visit and reason:  7/14/20 phone visit- venous stasis, overactive bladder, tinea

## 2022-02-15 NOTE — TELEPHONE ENCOUNTER
---------------------  From: Eliana Sibley MA (eRx Pool ([id number]_[place]))   To: Jan York PA-C;     Sent: 9/27/2019 1:32:36 PM CDT  Subject: FW: Medication Management   Due Date/Time: 9/28/2019 12:25:00 PM CDT     PCP:   AFSHAN    Medication:   Azelastine eye drops  Last Filled:  _    Quantity:  _  Refills:  _    Date of last office visit and reason:   9/19/19  Date of last labs pertaining to condition:  N/A    Note:  Medication does not appear to be on mediation list     Return to Clinic order placed?  Yes          ------------------------------------------  From: [facility] [id number] [facility]  To: Negrito Abdul MD  Sent: September 27, 2019 12:25:21 PM CDT  Subject: Medication Management  Due: September 28, 2019 12:25:21 PM CDT    ** On Hold Pending Signature **  Drug: azelastine ophthalmic (azelastine 0.05% ophthalmic solution)  INSTILL 1 DROP INTO BOTH EYES TWICE A DAY AS NEEDED FOR ALLERGY SYMPTOMS  Quantity: 6 mL       Days Supply: 30        Refills: 0  Substitutions Allowed  Notes from Pharmacy:     Dispensed Drug: azelastine ophthalmic (azelastine 0.05% ophthalmic solution)  INSTILL 1 DROP INTO BOTH EYES TWICE A DAY AS NEEDED FOR ALLERGY SYMPTOMS  Quantity: 6 mL       Days Supply: 30        Refills: 0  Substitutions Allowed  Notes from Pharmacy:   ---------------------------------------------------------------  From: Jan York PA-C   To: [facility] [id number] [facility]    Sent: 9/27/2019 1:35:50 PM CDT  Subject: FW: Medication Management     ** Approved **  azelastine ophthalmic (AZELASTINE HCL 0.05% DROPS)  INSTILL 1 DROP INTO BOTH EYES TWICE A DAY AS NEEDED FOR ALLERGY SYMPTOMS  Qty:  6 mL        Days Supply:  30        Refills:  0          Substitutions Allowed     Route To Pharmacy - [facility] [id number] [facility]   Signed by Jan York PA-C

## 2022-02-15 NOTE — PROGRESS NOTES
Patient:   PAKO BARBER            MRN: [id number]            FIN: [id number]               Age:   79 years     Sex:  Female     :  1940   Associated Diagnoses:   Diabetes mellitus, type 2; Mixed hyperlipidemia; EDEMA   Author:   Negrito Abdul MD      Visit Information      Date of Service: 2019 01:58 pm  Performing Location: [facility]  Encounter#: [id number]      Primary Care Provider (PCP):  Richard Conde MD    NPI# [id number]      Referring Provider:  Negrito Abdul MD    NPI# [id number]      Chief Complaint   2019 2:00 PM CST    Issues with medicaitons, Swelling in legs        History of Present Illness             The patient presents for follow-up evaluation of diabetes.  The quality of the patient's diabetes is described as being unchanged from previous visit.  Exacerbating factors consist of none.  Relieving factors consist of increased activity and medication.  Glucose results: within target range.  Hemoglobin A1c results: > 6% and within target range.               The patient presents with peripheral edema.  The location of peripheral edema is bilateral, the ankle(s), foot (feet).  The peripheral edema is characterized by skin red in color and skin mottled in appearance.  The severity of the peripheral edema is moderate.  The peripheral edema is constant and fluctuates in intensity.  The context of the peripheral edema: occurred in association with missed medication.        Interval History   Cholesterol Management   Total cholesterol results Pending.  Associated symptoms characterized by no fatigue, chest pain, joint pain, muscle weakness or myalgias.        Review of Systems   Constitutional:  Negative.    Respiratory:  Negative.    Cardiovascular:  Negative except as documented in history of present illness.    Endocrine:  Negative except as documented in history of present illness.    Psychiatric:  Negative.       Health Status   Allergies:     Allergic Reactions (Selected)  Severity Not Documented  Latex (No reactions were documented)  Opthmolic eye drops (Swelling of eye..)  Vicodin (Behavioral disturbances)  Zocor (Diarrhea)   Medications:  (Selected)   Prescriptions  Prescribed  Ditropan XL 10 mg/24 hr oral tablet, extended release: = 1 tab(s) ( 10 mg ), PO, Daily, # 90 tab(s), 3 Refill(s), Type: Maintenance, Pharmacy: [facility] , 1 tab(s) Oral daily  Lasix 40 mg oral tablet: = 1 tab(s) ( 40 mg ), PO, Daily, # 90 tab(s), 3 Refill(s), Type: Maintenance, Pharmacy: [facility] , 1 tab(s) Oral daily  atorvastatin 80 mg oral tablet: = 1 tab(s), Oral, daily, # 90 tab(s), 3 Refill(s), Type: Maintenance, Pharmacy: [facility] , 1 tab(s) Oral daily  azelastine 0.05% ophthalmic solution: See Instructions, Instructions: INSTILL 1 DROP INTO BOTH EYES TWICE A DAY AS NEEDED FOR ALLERGY SYMPTOMS, # 18 mL, 5 Refill(s), Type: Physician Stop, Pharmacy: [facility] , INSTILL 1 DROP INTO BOTH EYES TWICE A DAY AS NEEDED FO...  custom fit diabetic shoes: custom fit diabetic shoes, See Instructions, Instructions: wear daily for heel pain, neuropathy, venous insufficiency, Supply, # 2 EA, 0 Refill(s), Type: Maintenance, Pharmacy: [facility], wear daily for heel pain, neuropathy, venous insufficiency  electric recliner and lift chair: electric recliner and lift chair, See Instructions, Instructions: use for sitting and for elevating legs, Supply, # 1 EA, 0 Refill(s), Type: Maintenance  metFORMIN 500 mg oral tablet: = 1 tab(s) ( 500 mg ), PO, BID, # 180 tab(s), 3 Refill(s), Type: Maintenance, Pharmacy: [facility] , 1 tab(s) Oral bid  moldable orthotics: moldable orthotics, See Instructions, Instructions: use daily with diabetic shoes, Supply, # 6 EA, 0 Refill(s), Type: Maintenance, Pharmacy: [facility], use daily with diabetic shoes  nystatin 100,000  units/g topical cream: 1 jonathan, TOP, TID, # 30 g, 2 Refill(s), Type: Maintenance, Pharmacy: [facility] , 1 jonathan Topical tid  Documented Medications  Documented  Aleve 220 mg oral capsule: = 1 cap(s) ( 220 mg ), Oral, q12 hrs, 0 Refill(s), Type: Maintenance  Daily Multiple Vitamins: PO, Daily, 0  Tylenol: PO, PRN: as needed for pain, 0 Refill(s), Type: Maintenance  Vitamin D3 1000 intl units oral capsule: 1 cap(s) ( 1,000 International Unit ), PO, daily, 0 Refill(s), Type: Soft Stop  aspirin 81 mg oral tablet: 1 tab(s) ( 81 mg ), PO, Daily, # 30 tab(s), 0 Refill(s), Type: Maintenance  calcium (as carbonate)-vitamin D 600 mg-125 intl units oral tablet: 1 tab(s), Oral, tid, 0 Refill(s), Type: Maintenance   Problem list:    All Problems (Selected)  Chronic Venous Insufficiency / ICD-9-.81 / Confirmed  CVA, old, cognitive deficits / ICD-9-.0 / Confirmed  Diabetes mellitus with diabetic neuropathy / SNOMED CT 674686427 / Confirmed  Diabetes mellitus, type 2 / SNOMED CT 182377674 / Confirmed  EDEMA / ICD-9-.3 / Confirmed  Hyperplastic Colonic Polyp / SNOMED CT 369760653 / Confirmed  Metabolic Syndrome / ICD-9-.7 / Confirmed  Mixed hyperlipidemia / SNOMED CT 396924626 / Confirmed  OA (Osteoarthritis) / ICD-9-.90 / Confirmed  Obesity / ICD-9-.00 / Probable  Osteoporosis / ICD-9-.00 / Confirmed  Polio / ICD-9-.90 / Confirmed  Urge incontinence / SNOMED CT 405302943 / Confirmed  Varicose Veins of Legs / ICD-9-.9 / Confirmed      Histories   Past Medical History:    Active  Hyperplastic Colonic Polyp (SNOMED CT 330890225): Onset in 2004 at 64 years.  OA (Osteoarthritis) (ICD-9-.90)  Osteoporosis (ICD-9-.00)  EDEMA (ICD-9-.3)  Metabolic Syndrome (ICD-9-.7)  Polio (ICD-9-.90)  Chronic Venous Insufficiency (ICD-9-.81)  Varicose Veins of Legs (ICD-9-.9)  Resolved  Inpatient stay (SNOMED CT 479107447): Onset on  10/10/2018 at 78 years.  Resolved on 10/13/2018 at 78 years.  Comments:  10/18/2018 CDT 9:42 AM BOBT Rachel Bailon  [place], [place] - Acute and chronic right-sided weakness.  Fracture of Wrist (ICD-9-.00): Onset in 2004 at 63 years.  Resolved.  Comments:  5/8/2013 CDT 11:22 AM Michelle Preston  Left    5/8/2013 CDT 11:25 AM CDT Michelle Hall  Work-related fall.  Buttock Pain (ICD-9-.1): Onset in 2004 at 63 years.  Resolved.  Comments:  5/8/2013 CDT 11:24 AM Michelle Preston  Work-related fall.  Chronic left buttock pain since then.  Stroke (ICD-9-): Onset on 7/14/2003 at 63 years.  Resolved.  Comments:  5/8/2013 CDT 12:03 PM Michelle Preston  Acute CVA with right-sided weakness.  Tobacco user (ICD-9-.1):  Resolved.  Comments:  5/8/2013 CDT 11:46 AM Michelle Preston  Patient states that she quit in 1989.   Family History:    Heart disease  Mother  Grandmother (M)  Brother  Bladder cancer..  Mother     Procedure history:    Extracapsular cataract extraction and insertion of intraocular lens (SNOMED CT 799459253) on 9/15/2016 at 76 Years.  Comments:  10/10/2016 1:01 PM BOBT Rachel Bailon  Right.  Extracapsular cataract extraction and insertion of intraocular lens (SNOMED CT 408547526) performed by Ethan Somers MD on 9/1/2016 at 76 Years.  Comments:  9/8/2016 2:37 PM CDT - Rachel Ivan  Left.  Colonoscopy (SNOMED CT 381919348) performed by Demond Knight MD on 6/25/2014 at 74 Years.  Comments:  6/25/2014 9:16 AM Demond Mccoy MD  Moderate left diberticulosis. No polyps. Repeat 10 years.  Sclerotherapy of vein of lower limb (SNOMED CT 3763513622) in 2010 at 70 Years.  Comments:  5/8/2013 11:40 AM Michelle Preston  Left leg in 03/2010.  Right leg in 04/2010.  [facility].  Laser ablation of long saphenous vein (OMED CT 7173387432) on 8/3/2009 at 69 Years.  Comments:  5/8/2013 11:45 AM Michelle Preston  Right great and right accessory saphenous  veins.  Fluoroscopically-guided needle placement for injection of ischial bursa on the left. on 10/10/2008 at 68 Years.  Comments:  2013 11:49 AM Michelle Preston  [facility].  Bone density scan (SNOMED CT 542075209) on 2007 at 67 Years.  Comments:  2013 11:58 AM Michelle Preston  Osteopenia  Fracture of Wrist (ICD-9-.00) in  at 64 Years.  Comments:  2013 11:54 AM Michelle Preston  Left  Colonoscopy (SNOMED CT 064508144) performed by Beny DAMON [facility] on 2004 at 63 Years.  Comments:  2011 7:35 AM CDT - Rachel Ivan  Repeat in 10 yrs.    10/4/2010 11:58 AM CDT - Antonia DAMON, Demond  diverticulosis. single hyperplastic rectal polyp  Bone density scan (SNOMED CT 943080294) in  at 62 Years.  Arthroscopy of knee (SNOMED CT 710294961) in  at 60 Years.  Comments:  2013 12:08 PM BOBT Michelle Hall  Left knee.  Flexible sigmoidoscopy (SNOMED CT 85381671) performed by Franko DAMON [place] on 1999 at 59 Years.  Arthroscopy of Knee (ICD-9-CM 80.26) in  at 58 Years.   section (SNOMED CT 91185795).  Comments:  2013 11:52 AM Michelle Preston  1960's    2010 12:42 PM CST - Mila Brush   3, Para 3   Social History:        Alcohol Assessment: Denies Alcohol Use            Never      Tobacco Assessment: Past            Past, Cigarettes, 15 per day.  Started age 19 Years.  Stopped age 44 Years.      Substance Abuse Assessment: Denies Substance Abuse      Employment and Education Assessment            Retired      Home and Environment Assessment            Marital status: .  3 children.      Other Assessment            Marital status,         Physical Examination   Vital Signs   2019 2:00 PM CST Temperature Tympanic 97.9 DegF    Peripheral Pulse Rate 76 bpm    Pulse Site Radial artery    HR Method Manual    Systolic Blood Pressure 118 mmHg    Diastolic Blood Pressure 60 mmHg    Mean Arterial Pressure 79 mmHg    BP Site  Left arm    BP Method Manual      Measurements from flowsheet : Measurements   11/8/2019 2:00 PM CST Height Measured - Standard 64 in    Weight Measured - Standard 188 lb    BSA 1.96 m2    Body Mass Index 32.27 kg/m2  HI      General:  Alert and oriented, No acute distress.    Eye:  Pupils are equal, round and reactive to light, Extraocular movements are intact, Normal conjunctiva.    HENT:  Normocephalic, Tympanic membranes are clear, Normal hearing, No pharyngeal erythema.    Neck:  Supple, Non-tender, No lymphadenopathy.    Respiratory:  Lungs are clear to auscultation, Respirations are non-labored, Breath sounds are equal, Symmetrical chest wall expansion.    Cardiovascular:  Normal rate, Regular rhythm, Normal peripheral perfusion.         Edema: Bilateral, 2+.    Gastrointestinal:  Soft, Non-tender, Non-distended.    Feet:  Normal by visual exam, Normal pulses, Sensation intact, By monofilament exam, By vibration.    Psychiatric:  Cooperative, Appropriate mood & affect, Normal judgment, Non-suicidal.       Review / Management   Results review:  Pending.       Impression and Plan   Diagnosis     Diabetes mellitus, type 2 (YGA48-IL E11.9).     Course:  Well controlled.    Orders     Orders (Selected)   Outpatient Orders  Ordered (Dispatched)  Hemoglobin A1c* ([facility]): Specimen Type: Blood, Collection Date: 11/08/19 14:37:00 CST  Prescriptions  Prescribed  metFORMIN 500 mg oral tablet: = 1 tab(s) ( 500 mg ), PO, BID, # 180 tab(s), 3 Refill(s), Type: Maintenance, Pharmacy: [facility], 1 tab(s) Oral bid.     Diagnosis     Mixed hyperlipidemia (WAA98-CJ E78.2).     Course:  Progressing as expected.    Orders     Orders (Selected)   Outpatient Orders  Ordered (Dispatched)  Lipid panel with reflex to direct ldl* ([facility]): Specimen Type: Serum, Collection Date: 11/08/19 14:37:00 CST  Prescriptions  Prescribed  atorvastatin 80 mg oral tablet: = 1 tab(s), Oral, daily, # 90 tab(s), 3 Refill(s),  Type: Maintenance, Pharmacy: [facility] , 1 tab(s) Oral daily.        Diagnosis   EDEMA (EZU61-IK R60.9)   Course:  Progressing as expected.    Orders     Orders (Selected)   Outpatient Orders  Ordered (Dispatched)  Basic Metabolic Panel* ([facility]): Specimen Type: Serum, Collection Date: 11/08/19 14:37:00 CST  CBC (includes diff/plt)* ([facility]): Specimen Type: Blood, Collection Date: 11/08/19 14:37:00 CST  Prescriptions  Prescribed  Lasix 40 mg oral tablet: = 1 tab(s) ( 40 mg ), PO, Daily, # 90 tab(s), 3 Refill(s), Type: Maintenance, Pharmacy: [facility] , 1 tab(s) Oral daily.     Orders     Orders   Requests (Return to Office):  Return to Clinic (Request) (Order): RFV: CBC, LIPID, Comp, A1C, TYRONE 1 week prior, Return in 6 months.

## 2022-02-15 NOTE — PROGRESS NOTES
Patient:   PAKO BARBER            MRN: [id number]            FIN: [id number]               Age:   80 years     Sex:  Female     :  1940   Associated Diagnoses:   Varicose Veins of Legs; Chronic Venous Insufficiency; Overactive bladder; Tinea corporis   Author:   Richard Conde MD      Visit Information      Date of Service: 2020 07:22 am  Performing Location: [facility]  Encounter#: [id number]      Primary Care Provider (PCP):  Richard Conde MD    NPI# [id number]      Referring Provider:  Richard Conde MD    NPI# [id number]   Visit type:  Telephone Encounter.    Source of history:  Patient.    Location of patient:  home  Call Start Time:   541pm  Call End Time:    _600 pm      Chief Complaint   2020 5:14 PM CDT    Phone Visit: follow-up ER, leg pain and swelling, water like blisters on legs that have not helped, would like Nystain powder instead of cream, possbile UTI, frequent urintation, odor to urine, verbal consent for visit   _      History of Present Illness   Today's visit was conducted via telephone due to the COVID-19 pandemic. Patient's consent to telephone visit was obtained and documented.      Reason for visit:  _  1. peripheral edema discussed, continues to have issues with edema, skin changes, tries wrapping but only effective for one leg, ongoing frustration, discussed ER visit, discussed referral to vascular surgery, she has not heard back yet and will call us if no contact by next week from [place]  2. discussed weight loss, has been working at losing weight, eating healthy, cannot exercise, has lost some weight  3. urinary symptoms reviewed, has noticed increased urinary urgency, had been on oxybutinin for quite some time, stopped last , she isn't sure why she stopped, thinks that symptoms may be worse, always worries about bladder cancer due to mother's history  4. rash in skin folds ongoing issue, nystatin is too wet, would like to try powder  instead      Impression and Plan   Diagnosis     Varicose Veins of Legs (FCK36-WQ I83.93).     Chronic Venous Insufficiency (ALX90-QZ I87.2).     Plan:  continues to have symptomatic edema, stockings are not working for her at this time, has consult with vascular surgery pending, ER note reviewed.    Diagnosis     Overactive bladder (IKC78-PP N32.81).     Course:  will trial restarting oxybutinin and if not effective or ongoing concerns, consider referral back to see urology.    Diagnosis     Tinea corporis (TDW02-DD B35.4).     Course:  not fully controlled, will try powder.    Orders     Orders (Selected)   Prescriptions  Prescribed  nystatin 100,000 units/g topical powder: 1 jonathan, Topical, tid, # 30 gm, 5 Refill(s), Type: Maintenance, Pharmacy: [facility], 1 jonathan Topical tid, 64, in, 07/14/20 17:14:00 CDT, Height Measured, 185, lb, 01/20/20 9:39:00 CST, Weight Measured.        Health Status   Allergies:    Allergic Reactions (Selected)  Severity Not Documented  Latex (No reactions were documented)  Opthmolic eye drops (Swelling of eye..)  Vicodin (Behavioral disturbances)  Zocor (Diarrhea)   Medications:  (Selected)   Prescriptions  Prescribed  Klor-Con M20 oral tablet, extended release: = 1 tab(s) ( 20 mEq ), Oral, bid, # 180 tab(s), 3 Refill(s), Type: Maintenance, Pharmacy: [facility], 1 tab(s) Oral bid  Lasix 40 mg oral tablet: = 1 tab(s) ( 40 mg ), PO, Daily, # 90 tab(s), 3 Refill(s), Type: Maintenance, Pharmacy: [facility], 1 tab(s) Oral daily  atorvastatin 80 mg oral tablet: = 1 tab(s), Oral, daily, # 90 tab(s), 3 Refill(s), Type: Maintenance, Pharmacy: [id number], 1 tab(s) Oral daily  azelastine 0.05% ophthalmic solution: See Instructions, Instructions: INSTILL 1 DROP INTO BOTH EYES TWICE A DAY AS NEEDED FOR ALLERGY SYMPTOMS, # 18 mL, 2 Refill(s), Type: Acute, Pharmacy: [id number], INSTILL 1 DROP INTO BOTH EYES TWICE A DAY AS NEEDED FOR ALLERGY  SYMPTOMS  custom fit diabetic shoes: custom fit diabetic shoes, See Instructions, Instructions: wear daily for heel pain, neuropathy, venous insufficiency, Supply, # 2 EA, 0 Refill(s), Type: Maintenance, Pharmacy: [facility], wear daily for heel pain, neuropathy, venous insufficiency  electric recliner and lift chair: electric recliner and lift chair, See Instructions, Instructions: use for sitting and for elevating legs, Supply, # 1 EA, 0 Refill(s), Type: Maintenance  metFORMIN 500 mg oral tablet: = 1 tab(s) ( 500 mg ), PO, BID, # 180 tab(s), 3 Refill(s), Type: Maintenance, Pharmacy: [facility], 1 tab(s) Oral bid  moldable orthotics: moldable orthotics, See Instructions, Instructions: use daily with diabetic shoes, Supply, # 6 EA, 0 Refill(s), Type: Maintenance, Pharmacy: [facility], use daily with diabetic shoes  nystatin 100,000 units/g topical cream: 1 jonathan, Topical, tid, # 30 gm, 5 Refill(s), Type: Maintenance, Pharmacy: [facility], 1 jonathan Topical tid  Documented Medications  Documented  Aleve 220 mg oral capsule: = 1 cap(s) ( 220 mg ), Oral, q12 hrs, 0 Refill(s), Type: Maintenance  Tylenol: PO, PRN: as needed for pain, 0 Refill(s), Type: Maintenance  Vitamin D3 1000 intl units oral capsule: 1 cap(s) ( 1,000 International Unit ), PO, daily, 0 Refill(s), Type: Soft Stop  aspirin 81 mg oral tablet: 1 tab(s) ( 81 mg ), PO, Daily, # 30 tab(s), 0 Refill(s), Type: Maintenance   Problem list:    All Problems  Varicose Veins of Legs / ICD-9-.9 / Confirmed  Urge incontinence / SNOMED CT 232515659 / Confirmed  Diabetes mellitus, type 2 / SNOMED CT 699967748 / Confirmed  Polio / ICD-9-.90 / Confirmed  Osteoporosis / ICD-9-.00 / Confirmed  Obesity / ICD-9-.00 / Probable  OA (Osteoarthritis) / ICD-9-.90 / Confirmed  Mixed hyperlipidemia / SNOMED CT 094716309 / Confirmed  Metabolic Syndrome / ICD-9-.7 / Confirmed  EDEMA / ICD-9-.3 / Confirmed  Diabetes  mellitus with diabetic neuropathy / SNOMED CT 152046694 / Confirmed  CVA, old, cognitive deficits / ICD-9-.0 / Confirmed  Chronic Venous Insufficiency / ICD-9-.81 / Confirmed  Hyperplastic Colonic Polyp / SNOMED CT 604864002 / Confirmed      Histories   Past Medical History:    Active  Hyperplastic Colonic Polyp (SNOMED CT 784726660): Onset in 2004 at 64 years.  OA (Osteoarthritis) (ICD-9-.90)  Osteoporosis (ICD-9-.00)  EDEMA (ICD-9-.3)  Metabolic Syndrome (ICD-9-.7)  Polio (ICD-9-.90)  Chronic Venous Insufficiency (ICD-9-.81)  Varicose Veins of Legs (ICD-9-.9)  Resolved  Inpatient stay (SNOMED CT 006390543): Onset on 10/10/2018 at 78 years.  Resolved on 10/13/2018 at 78 years.  Comments:  10/18/2018 CDT 9:42 AM CDT - [place], [place] - Acute and chronic right-sided weakness.  Fracture of Wrist (ICD-9-.00): Onset in 2004 at 63 years.  Resolved.  Comments:  5/8/2013 CDT 11:22 AM Michelle Preston  Left    5/8/2013 CDT 11:25 AM Michelle Preston  Work-related fall.  Buttock Pain (ICD-9-.1): Onset in 2004 at 63 years.  Resolved.  Comments:  5/8/2013 CDT 11:24 AM Michelle Preston  Work-related fall.  Chronic left buttock pain since then.  Stroke (ICD-9-): Onset on 7/14/2003 at 63 years.  Resolved.  Comments:  5/8/2013 CDT 12:03 PM Michelle Preston  Acute CVA with right-sided weakness.  Tobacco user (ICD-9-.1):  Resolved.  Comments:  5/8/2013 CDT 11:46 AM Michelle Preston  Patient states that she quit in 1989.   Family History:    Heart disease  Mother  Grandmother (M)  Brother  Bladder cancer..  Mother     Procedure history:    Extracapsular cataract extraction and insertion of intraocular lens (598911953) on 9/15/2016 at 76 Years.  Comments:  10/10/2016 1:01 PM CDT - Rachel Ivan.  Extracapsular cataract extraction and insertion of intraocular lens (727660569) on 9/1/2016 at 76 Years.  Comments:  9/8/2016 2:37 PM  Rachel Morales  Left.  Colonoscopy (153020314) on 2014 at 74 Years.  Comments:  2014 9:16 AM Demond Mccoy MD  Moderate left diberticulosis. No polyps. Repeat 10 years.  Sclerotherapy of vein of lower limb (9634567197) in  at 70 Years.  Comments:  2013 11:40 AM Michelle Preston  Left leg in 2010.  Right leg in 2010.  [facility].  Laser ablation of long saphenous vein (4372876204) on 8/3/2009 at 69 Years.  Comments:  2013 11:45 AM Michelle Preston  Right great and right accessory saphenous veins.  Fluoroscopically-guided needle placement for injection of ischial bursa on the left. on 10/10/2008 at 68 Years.  Comments:  2013 11:49 AM Michelle Preston  [facility].  Bone density scan (879895546) on 2007 at 67 Years.  Comments:  2013 11:58 AM Michelle Preston  Osteopenia  Fracture of Wrist (814.00) in  at 64 Years.  Comments:  2013 11:54 AM Michelle Preston  Left  Colonoscopy (143450743) on 2004 at 63 Years.  Comments:  2011 7:35 AM Rachel Morales  Repeat in 10 yrs.    10/4/2010 11:58 AM Demond Mccoy MD  diverticulosis. single hyperplastic rectal polyp  Bone density scan (410502217) in  at 62 Years.  Arthroscopy of knee (466191191) in  at 60 Years.  Comments:  2013 12:08 PM Michelle Preston  Left knee.  Flexible sigmoidoscopy (47726386) on 1999 at 59 Years.  Arthroscopy of Knee (80.26) in  at 58 Years.   section (97956704).  Comments:  2013 11:52 AM Michelle Preston  1960's    2010 12:42 PM Mila García   3, Para 3   Social History:        Alcohol Assessment: Denies Alcohol Use            Never      Tobacco Assessment: Past            Past, Cigarettes, 15 per day.  Started age 19 Years.  Stopped age 44 Years.      Substance Abuse Assessment: Denies Substance Abuse      Employment and Education Assessment            Retired      Home and Environment  Assessment            Marital status: .  3 children.      Other Assessment            Marital status,         Physical Examination   Measurements from flowsheet : Measurements   7/14/2020 5:14 PM CDT    Height Measured - Standard                64 in

## 2022-02-15 NOTE — NURSING NOTE
Hearing and Vision Screening Entered On:  1/20/2020 10:33 AM CST    Performed On:  1/20/2020 10:32 AM CST by Kusum Alexander CMA               Hearing and Vision Screening   Hearing Screen Comments :   Patient has hearing aids but she does not wear them.    Kusum Alexander CMA - 1/20/2020 10:32 AM CST

## 2022-02-15 NOTE — TELEPHONE ENCOUNTER
Entered by Negrito Abdul MD on November 08, 2019 11:36:32 AM CST  ---------------------  From: Negrito Abdul MD   To: [id number]    Sent: 11/8/2019 11:36:32 AM CST  Subject: Medication Management     ** Approved **  Order:azelastine ophthalmic (azelastine 0.05% ophthalmic solution)  INSTILL 1 DROP INTO BOTH EYES TWICE A DAY AS NEEDED FOR ALLERGY SYMPTOMS  Qty:  18 mL        Days Supply:  90        Refills:  2          Substitutions Allowed     Route To Pharmacy - [id number]   Note from Pharmacy:  REQUEST FOR 90 DAYS PRESCRIPTION.  Signed by Negrito Abdul MD    ** Cancelled: **  Discontinue:azelastine ophthalmic (azelastine 0.05% ophthalmic solution)  Signed by Negrito Abdul MD            From: [facility]  To: Negrito Abdul MD  Sent: November 7, 2019 5:04:36 PM CST  Subject: Medication Management  Due: November 8, 2019 5:04:36 PM CST     Originally Prescribed Drug:  Drug: azelastine ophthalmic (azelastine 0.05% ophthalmic solution), INSTILL 1 DROP INTO BOTH EYES TWICE A DAY AS NEEDED FOR ALLERGY SYMPTOMS  Quantity: 6 mL  Days Supply: 30  Refills: 5  Substitutions Allowed  Notes from Pharmacy: REQUEST FOR 90 DAYS PRESCRIPTION.     ** On Hold Pending Signature **  Preferred Alternative Drug: azelastine ophthalmic (azelastine 0.05% ophthalmic solution), INSTILL 1 DROP INTO BOTH EYES TWICE A DAY AS NEEDED FOR ALLERGY SYMPTOMS  Quantity: 18 mL  Days Supply: 90  Refills: 2  Substitutions Allowed  Notes from Pharmacy: REQUEST FOR 90 DAYS PRESCRIPTION.

## 2022-02-15 NOTE — TELEPHONE ENCOUNTER
---------------------  From: Rocío Lyles CMA   To: Appointment Pool ([id number]);     Sent: 6/16/2021 11:46:52 AM CDT  Subject: Due visit     Due for DM check and non fasting labs. Please call her per [facility] to get scheduled-need to come in and then we can get her diabetic shoes ordered as well. Medicare will likely need face to face for this.     I know we called her last month but please try her again. Thanks!    Addendum by Elva Wise on May 25, 2021 10:20:02 AM CDT (Verified)  PT WILL SCHEDULE WHEN AVAILABLELVM x1 for pt to call back and scheduleLMX3 TO SCHEDULE

## 2022-02-15 NOTE — PROGRESS NOTES
Patient:   PAKO BARBER            MRN: [id number]            FIN: [id number]               Age:   78 years     Sex:  Female     :  1940   Associated Diagnoses:   Hospital discharge follow-up; Thrombocytopenia; Hospital discharge follow-up; Hemiparesis, right; CVA, old, cognitive deficits   Author:   Richard Conde MD      Visit Information      Date of Service: 10/18/2018 09:20 am  Performing Location: [facility]  Encounter#: [id number]      Primary Care Provider (PCP):  Richard Conde MD    NPI# [id number]      Referring Provider:  Richard Conde MD# [id number]      Chief Complaint   10/18/2018 9:28 AM CDT   Follow-up Hostpial stay: [facility] 10/13/18      History of Present Illness   Chief complaint reviewed and confirmed with patient.    Follow-up from hospital stay at [facility], discharged on 10/13/18.  Discharge summary reviewed.  Episode of R-sided weakness and facial droop led to ED visit.  Concern for possible CVA resulted in transfer to [facility].  MRI showed no evidence of acute hemorrhage, infarct, or mass effect, but area of damage from prior CVA.  Nonspecific white matter changes and volume loss also noted.  EEG did not confirm seizure activity, although considered as possible explanation.    Will follow-up with neurology in December for repeat EEG.  Has not had recurrence of facial droop, slurred speech, or chest pain since hospital discharge.    Some residual increased right-sided weakness as compared to baseline.  Daughter feels she has had some increased difficulty with short-term memory since this incident.  Has home health in place.  That has been going well.  Also receiving Meals on Wheels.  Will be receiving in-home PT.  Has had some increased loose stools during and since the hospitalization.  Taking Pepto-Bismol.  Peripheral edema continues - worse on right side than left.  Wears compression stockings.  Has not yet received diabetic shoes, but will contact medical  supply again to do so.  Using cane for mobility, sometimes needs walker.      Review of Systems   Eye:  Negative.    Ear/Nose/Mouth/Throat:  Negative.    Respiratory:  No shortness of breath.    Cardiovascular:  Peripheral edema, No chest pain, No palpitations, No syncope.    Gastrointestinal:  Negative except as documented in history of present illness, No abdominal pain.    Genitourinary:  Negative.    Neurologic:  Abnormal balance, Confusion, continued weakness on right side, No numbness, No tingling, No headache.    Psychiatric:  Negative.       Health Status   Allergies:    Allergic Reactions (Selected)  Severity Not Documented  Latex (No reactions were documented)  Opthmolic eye drops (Swelling of eye..)  Vicodin (Behavioral disturbances)  Zocor (Diarrhea)   Medications:  (Selected)   Prescriptions  Prescribed  Ditropan XL 10 mg/24 hr oral tablet, extended release: = 1 tab(s) ( 10 mg ), PO, Daily, # 90 tab(s), 3 Refill(s), Type: Maintenance, Pharmacy: [id number], 1 tab(s) Oral daily  Lasix 40 mg oral tablet: = 1 tab(s) ( 40 mg ), PO, Daily, # 90 tab(s), 3 Refill(s), Type: Maintenance, Pharmacy: [id number], 1 tab(s) Oral daily  atorvastatin 80 mg oral tablet: = 1 tab(s) ( 80 mg ), po, daily, # 90 tab(s), 3 Refill(s), Type: Maintenance, Pharmacy: [id number], 1 tab(s) Oral daily  custom fit diabetic shoes: custom fit diabetic shoes, See Instructions, Instructions: wear daily for heel pain, neuropathy, venous insufficiency, Supply, # 2 EA, 0 Refill(s), Type: Maintenance, Pharmacy: [facility], wear daily for heel pain, neuropathy, venous insufficiency  metFORMIN 500 mg oral tablet: = 1 tab(s) ( 500 mg ), PO, BID, # 180 tab(s), 3 Refill(s), Type: Maintenance, Pharmacy: [id number], 1 tab(s) Oral bid  moldable orthotics: moldable orthotics, See Instructions, Instructions: use daily with diabetic shoes, Supply, # 6 EA, 0 Refill(s), Type: Maintenance, Pharmacy: [facility],  use daily with diabetic shoes  potassium chloride 20 mEq oral tablet, extended release: = 1 tab(s) ( 20 mEq ), PO, daily, # 90 tab(s), 3 Refill(s), Type: Maintenance, Pharmacy: [id number], 1 tab(s) Oral daily  triamcinolone 0.1% topical ointment: 1 jonathan, top, tid, Instructions: apply a thin film  to affected area  Area needs to be dry, # 60 gm, 1 Refill(s), Type: Maintenance, Pharmacy: [id number], 1 jonathan top tid,Instr:apply a thin film; to affected area; Area needs to be dry  Documented Medications  Documented  Daily Multiple Vitamins: PO, Daily, 0  Tylenol: PO, PRN: as needed for pain, 0 Refill(s), Type: Maintenance  Vitamin D3 1000 intl units oral capsule: 1 cap(s) ( 1,000 International Unit ), PO, daily, 0 Refill(s), Type: Soft Stop  aspirin 81 mg oral tablet: 1 tab(s) ( 81 mg ), PO, Daily, # 30 tab(s), 0 Refill(s), Type: Maintenance,    Medications          *denotes recorded medication          moldable orthotics: See Instructions, use daily with diabetic shoes, 6 EA, 0 Refill(s).          custom fit diabetic shoes: See Instructions, wear daily for heel pain, neuropathy, venous insufficiency, 2 EA, 0 Refill(s).          *Tylenol: PO, PRN: as needed for pain.          *aspirin 81 mg oral tablet: 81 mg, 1 tab(s), PO, Daily, 30 tab(s).          atorvastatin 80 mg oral tablet: 80 mg, 1 tab(s), po, daily, 90 tab(s), 3 Refill(s).          *Vitamin D3 1000 intl units oral capsule: 1,000 International Unit, 1 cap(s), PO, daily.          Lasix 40 mg oral tablet: 40 mg, 1 tab(s), PO, Daily, 90 tab(s), 3 Refill(s).          metFORMIN 500 mg oral tablet: 500 mg, 1 tab(s), PO, BID, 180 tab(s), 3 Refill(s).          *Daily Multiple Vitamins: PO, Daily.          Ditropan XL 10 mg/24 hr oral tablet, extended release: 10 mg, 1 tab(s), PO, Daily, 90 tab(s), 3 Refill(s).          potassium chloride 20 mEq oral tablet, extended release: 20 mEq, 1 tab(s), PO, daily, 90 tab(s), 3 Refill(s).          triamcinolone  0.1% topical ointment: 1 jonathan, top, tid, apply a thin film  to affected area  Area needs to be dry, 60 gm, 1 Refill(s).     Problem list:    All Problems (Selected)  Hyperplastic Colonic Polyp / SNOMED CT 814312360 / Confirmed  Buttock Pain / ICD-9-.1 / Confirmed  Chronic Venous Insufficiency / ICD-9-.81 / Confirmed  CVA, old, cognitive deficits / ICD-9-.0 / Confirmed  Diabetes mellitus with diabetic neuropathy / SNOMED CT 104797467 / Confirmed  EDEMA / ICD-9-.3 / Confirmed  Metabolic Syndrome / ICD-9-.7 / Confirmed  Mixed hyperlipidemia / SNOMED CT 870613365 / Confirmed  OA (Osteoarthritis) / ICD-9-.90 / Confirmed  Obesity / ICD-9-.00 / Probable  Osteoporosis / ICD-9-.00 / Confirmed  Polio / ICD-9-.90 / Confirmed  Diabetes mellitus, type 2 / SNOMED CT 231003597 / Confirmed  Varicose Veins of Legs / ICD-9-.9 / Confirmed      Histories   Past Medical History:    Active  Hyperplastic Colonic Polyp (842662950): Onset in 2004 at 64 years.  Buttock Pain (729.1): Onset in 2004 at 63 years.  Comments:  5/8/2013 CDT 11:24 AM Michelle Preston  Work-related fall.  Chronic left buttock pain since then.  OA (Osteoarthritis) (715.90)  Osteoporosis (733.00)  EDEMA (782.3)  Metabolic Syndrome (277.7)  Polio (045.90)  Chronic Venous Insufficiency (459.81)  Varicose Veins of Legs (454.9)  Resolved  Fracture of Wrist (814.00): Onset in 2004 at 63 years.  Resolved.  Comments:  5/8/2013 CDT 11:22 AM Michelle Preston  Left    5/8/2013 CDT 11:25 AM Michelle Preston  Work-related fall.  Stroke (436): Onset on 7/14/2003 at 63 years.  Resolved.  Comments:  5/8/2013 CDT 12:03 PM Michelle Preston  Acute CVA with right-sided weakness.  Tobacco user (305.1):  Resolved.  Comments:  5/8/2013 CDT 11:46 AM CDT - Michelle Zhao  Patient states that she quit in 1989.   Family History:    Heart disease  Mother  Grandmother (M)  Brother  Bladder cancer..  Mother     Procedure history:     Extracapsular cataract extraction and insertion of intraocular lens ([id number]) on 9/15/2016 at 76 Years.  Comments:  10/10/2016 1:01 PM - Rachel Ivan  Right.  Extracapsular cataract extraction and insertion of intraocular lens (325597126) on 2016 at 76 Years.  Comments:  2016 2:37 PM - Rachel Ivan  Left.  Colonoscopy (874440759) on 2014 at 74 Years.  Comments:  2014 9:16 CHHAYA - Demond Knight MD  Moderate left diberticulosis. No polyps. Repeat 10 years.  Sclerotherapy of vein of lower limb (1123640381) in  at 70 Years.  Comments:  2013 11:40 Michelle Kauffman  Left leg in 2010.  Right leg in 2010.  [facility].  Laser ablation of long saphenous vein (2301937098) on 8/3/2009 at 69 Years.  Comments:  2013 11:45 Michelle Kauffman  Right great and right accessory saphenous veins.  Fluoroscopically-guided needle placement for injection of ischial bursa on the left. on 10/10/2008 at 68 Years.  Comments:  2013 11:49 Michelle Kauffman  [facility].  Bone density scan (267893491) on 2007 at 67 Years.  Comments:  2013 11:58 Michelle Kauffman  Osteopenia  Fracture of Wrist (814.00) in  at 64 Years.  Comments:  2013 11:54 Michelle Kauffman  Left  Colonoscopy (121199385) on 2004 at 63 Years.  Comments:  2011 7:35 AM - Rachel Ivan  Repeat in 10 yrs.    10/4/2010 11:58 Demond Sykes MD  diverticulosis. single hyperplastic rectal polyp  Bone density scan (059561710) in  at 62 Years.  Arthroscopy of knee (044883929) in  at 60 Years.  Comments:  2013 12:08 PM - Michelle Zhao  Left knee.  Flexible sigmoidoscopy (30030704) on 1999 at 59 Years.  Arthroscopy of Knee (80.26) in  at 58 Years.   section (58602623).  Comments:  2013 11:52 AM - Michelle Zhao's    2010 12:42 PM - Mila Brush   3, Para 3      Physical Examination   Vital Signs   10/18/2018 9:28 AM CDT Temperature Tympanic 96.8 DegF  LOW     Peripheral Pulse Rate 80 bpm    Pulse Site Radial artery    HR Method Manual    Systolic Blood Pressure 100 mmHg    Diastolic Blood Pressure 60 mmHg    Mean Arterial Pressure 73 mmHg    BP Site Right arm    BP Method Manual      Measurements from flowsheet : Measurements   10/18/2018 9:28 AM CDT Height Measured - Standard 64 in    Weight Measured - Standard 191.8 lb    BSA 1.98 m2    Body Mass Index 32.92 kg/m2  HI      General:  Alert and oriented, No acute distress.    Eye:  Pupils are equal, round and reactive to light, Extraocular movements are intact.    HENT:  Normocephalic, Oral mucosa is moist.    Respiratory:  Lungs are clear to auscultation, Respirations are non-labored.    Cardiovascular:  Normal rate, Regular rhythm, LE edema bilaterally, worse on right.  2+ on right, 1+ on left..    Musculoskeletal:  Residual decreased strength and ROM on right side due to hemiparesis .    Integumentary:  Warm, Dry, Pink, No rash.    Neurologic:  Alert, Oriented.    Psychiatric:  Cooperative, Appropriate mood & affect.       Health Maintenance      Review / Management   Results review:  Lab results   10/18/2018 10:28 AM CDT WBC TR 6.6 x10^3/uL    RBC TR 3.97 x10^6/uL    Hgb TR 12.9 g/dL    Hct TR 38.4 %    MCV TR 96.5 fL    MCH TR 32.4 pg    MCHC TR 33.6 gm/dL    RDW TR 12.6 %    Platelet  x10^3/uL   .         Interpretation: Normal results.       Impression and Plan   Diagnosis     Hospital discharge follow-up (ULA39-HX Z09).     Thrombocytopenia (BGM87-VK D69.6).     Hemiparesis, right (KAR88-GZ G81.91).     CVA, old, cognitive deficits (YEI42-OO I69.319).     Plan:  Patient seems to be doing better since hospital discharge.  Continue with home health supports.  Will see neurology for follow up in December.  Thrombocytopenia has resolved per labs today.  Follow up if any return of symptoms or worsening..    Patient Instructions:       Counseled: Patient, Family.       Professional Services     Note by  MACIEL Devlin  Patient was seen with student and history and exam confirmed. Agree that documentation reflects findings and plan.  Richard Conde MD

## 2022-02-15 NOTE — PROGRESS NOTES
Patient:   PAKO BARBER            MRN: [id number]            FIN: [id number]               Age:   78 years     Sex:  Female     :  1940   Associated Diagnoses:   Diabetes Mellitus; Chronic Venous Insufficiency; Pain of left heel; Mixed hyperlipidemia   Author:   Richard Conde MD      Visit Information      Date of Service: 2018 12:54 pm  Performing Location: [facility]  Encounter#: [id number]      Primary Care Provider (PCP):  Richard Conde MD    NPI# [id number]      Referring Provider:  Richard Conde MD    NPI# [id number]      Chief Complaint   2018 1:00 PM CDT    DM Med check; follow-up labs; foot exam right foot abdnormal left foot normal; edmena in right leg      History of Present Illness   Patient presents for follow up on labs, A1c refill, foot exam with increased swelling in right leg and persistent lower extremity edema.  Edema has been worse in legs past several weeks  Elevation and compression stockings provide some relief, but legs remain edematous  Has been unable to take furosemide for past 2 days d/t having exhausted supply  Some redness and warmth over right LE that has been stable for past 6 months  No fevers, chills, NS  No changes in strength or sensation; decreased sensation and strength in RLE @ baseline d/t stroke  No fatigue, polyuria. Occasional urinary incontinence; exhausted oxybutin supply 2 days ago      Review of Systems   Constitutional:  Negative.    Eye:  Negative.    Respiratory:  Negative.    Cardiovascular:  Negative.    Gastrointestinal:  Negative.    Hematology/Lymphatics:  Negative except as documented in history of present illness.    Musculoskeletal:  Negative except as documented in history of present illness.    Neurologic:  Negative except as documented in history of present illness.       Health Status   Allergies:    Allergic Reactions (Selected)  Severity Not Documented  Latex (No reactions were documented)  Opthmolic eye drops  (Swelling of eye..)  Vicodin (Behavioral disturbances)  Zocor (Diarrhea)   Medications:  (Selected)   Prescriptions  Prescribed  Ditropan XL 10 mg/24 hr oral tablet, extended release: = 1 tab(s) ( 10 mg ), PO, Daily, # 90 tab(s), 3 Refill(s), Type: Maintenance, Pharmacy: [id number], 1 tab(s) Oral daily  Lasix 40 mg oral tablet: = 1 tab(s) ( 40 mg ), PO, Daily, # 90 tab(s), 3 Refill(s), Type: Maintenance, Pharmacy: [facility] #[id number], 1 tab(s) Oral daily  atorvastatin 80 mg oral tablet: = 1 tab(s) ( 80 mg ), po, daily, # 90 tab(s), 3 Refill(s), Type: Maintenance, Pharmacy: [id number], 1 tab(s) Oral daily  custom fit diabetic shoes: custom fit diabetic shoes, See Instructions, Instructions: wear daily for heel pain, neuropathy, venous insufficiency, Supply, # 2 EA, 0 Refill(s), Type: Maintenance, Pharmacy: [facility], wear daily for heel pain, neuropathy, venous insufficiency  metFORMIN 500 mg oral tablet: = 1 tab(s) ( 500 mg ), PO, BID, # 180 tab(s), 3 Refill(s), Type: Maintenance, Pharmacy: [id number], 1 tab(s) Oral bid  moldable orthotics: moldable orthotics, See Instructions, Instructions: use daily with diabetic shoes, Supply, # 6 EA, 0 Refill(s), Type: Maintenance, Pharmacy: [facility], use daily with diabetic shoes  potassium chloride 20 mEq oral tablet, extended release: = 1 tab(s) ( 20 mEq ), PO, daily, # 90 tab(s), 3 Refill(s), Type: Maintenance, Pharmacy: [id number], 1 tab(s) Oral daily  triamcinolone 0.1% topical ointment: 1 jonathan, top, tid, Instructions: apply a thin film  to affected area  Area needs to be dry, # 60 gm, 1 Refill(s), Type: Maintenance, Pharmacy: [id number], 1 jonathan top tid,Instr:apply a thin film; to affected area; Area needs to be dry  Documented Medications  Documented  Daily Multiple Vitamins: PO, Daily, 0  Tylenol: PO, PRN: as needed for pain, 0 Refill(s), Type: Maintenance  Vitamin D3 1000 intl units oral capsule: 1 cap(s) (  1,000 International Unit ), PO, daily, 0 Refill(s), Type: Soft Stop  aspirin 81 mg oral tablet: 1 tab(s) ( 81 mg ), PO, Daily, # 30 tab(s), 0 Refill(s), Type: Maintenance,    Medications          *denotes recorded medication          moldable orthotics: See Instructions, use daily with diabetic shoes, 6 EA, 0 Refill(s).          custom fit diabetic shoes: See Instructions, wear daily for heel pain, neuropathy, venous insufficiency, 2 EA, 0 Refill(s).          *Tylenol: PO, PRN: as needed for pain.          *aspirin 81 mg oral tablet: 81 mg, 1 tab(s), PO, Daily, 30 tab(s).          atorvastatin 80 mg oral tablet: 80 mg, 1 tab(s), po, daily, 90 tab(s), 3 Refill(s).          *Vitamin D3 1000 intl units oral capsule: 1,000 International Unit, 1 cap(s), PO, daily.          Lasix 40 mg oral tablet: 40 mg, 1 tab(s), PO, Daily, 90 tab(s), 3 Refill(s).          metFORMIN 500 mg oral tablet: 500 mg, 1 tab(s), PO, BID, 180 tab(s), 3 Refill(s).          *Daily Multiple Vitamins: PO, Daily.          Ditropan XL 10 mg/24 hr oral tablet, extended release: 10 mg, 1 tab(s), PO, Daily, 90 tab(s), 3 Refill(s).          potassium chloride 20 mEq oral tablet, extended release: 20 mEq, 1 tab(s), PO, daily, 90 tab(s), 3 Refill(s).          triamcinolone 0.1% topical ointment: 1 jonathan, top, tid, apply a thin film  to affected area  Area needs to be dry, 60 gm, 1 Refill(s).     Problem list:    All Problems (Selected)  Hyperplastic Colonic Polyp / SNOMED CT 854174385 / Confirmed  Buttock Pain / ICD-9-.1 / Confirmed  Chronic Venous Insufficiency / ICD-9-.81 / Confirmed  CVA, old, cognitive deficits / ICD-9-.0 / Confirmed  Diabetes mellitus with diabetic neuropathy / SNOMED CT 465979307 / Confirmed  EDEMA / ICD-9-.3 / Confirmed  Metabolic Syndrome / ICD-9-.7 / Confirmed  Mixed hyperlipidemia / SNOMED CT 411830089 / Confirmed  OA (Osteoarthritis) / ICD-9-.90 / Confirmed  Obesity / ICD-9-.00 /  Probable  Osteoporosis / ICD-9-.00 / Confirmed  Polio / ICD-9-.90 / Confirmed  Diabetes mellitus, type 2 / SNOMED CT 019573093 / Confirmed  Varicose Veins of Legs / ICD-9-.9 / Confirmed      Histories   Past Medical History:    Active  Hyperplastic Colonic Polyp (453365185): Onset in 2004 at 64 years.  Buttock Pain (729.1): Onset in 2004 at 63 years.  Comments:  5/8/2013 CDT 11:24 AM BOBT Michelle Hall  Work-related fall.  Chronic left buttock pain since then.  OA (Osteoarthritis) (715.90)  Osteoporosis (733.00)  EDEMA (782.3)  Metabolic Syndrome (277.7)  Polio (045.90)  Chronic Venous Insufficiency (459.81)  Varicose Veins of Legs (454.9)  Resolved  Fracture of Wrist (814.00): Onset in 2004 at 63 years.  Resolved.  Comments:  5/8/2013 CDT 11:22 AM Michelle Preston  Left    5/8/2013 CDT 11:25 AM CDMichlele Lazar  Work-related fall.  Stroke (436): Onset on 7/14/2003 at 63 years.  Resolved.  Comments:  5/8/2013 CDT 12:03 PM Michelle Preston  Acute CVA with right-sided weakness.  Tobacco user (305.1):  Resolved.  Comments:  5/8/2013 CDT 11:46 AM Michelle Preston  Patient states that she quit in 1989.   Family History:    Heart disease  Mother  Grandmother (M)  Brother  Bladder cancer..  Mother     Procedure history:    Extracapsular cataract extraction and insertion of intraocular lens ([id number]) on 9/15/2016 at 76 Years.  Comments:  10/10/2016 1:01 PM - Rachel Ivan  Right.  Extracapsular cataract extraction and insertion of intraocular lens (539012596) on 9/1/2016 at 76 Years.  Comments:  9/8/2016 2:37 PM - Rachel Ivan  Left.  Colonoscopy (443112473) on 6/25/2014 at 74 Years.  Comments:  6/25/2014 9:16 AM - Demond Knight MD  Moderate left diberticulosis. No polyps. Repeat 10 years.  Sclerotherapy of vein of lower limb (2222381245) in 2010 at 70 Years.  Comments:  5/8/2013 11:40 AM - Michelle Zhao  Left leg in 03/2010.  Right leg in 04/2010.  [facility].  Laser ablation of long  saphenous vein (0299259151) on 8/3/2009 at 69 Years.  Comments:  2013 11:45 AM - Michelle Zhao  Right great and right accessory saphenous veins.  Fluoroscopically-guided needle placement for injection of ischial bursa on the left. on 10/10/2008 at 68 Years.  Comments:  2013 11:49 AM - Michelle Zhao  [facility].  Bone density scan (566930748) on 2007 at 67 Years.  Comments:  2013 11:58 AM - Michelle Zhao  Osteopenia  Fracture of Wrist (814.00) in  at 64 Years.  Comments:  2013 11:54 AM - Michelle Zhao  Left  Colonoscopy (936630811) on 2004 at 63 Years.  Comments:  2011 7:35 AM - Rachel Ivan  Repeat in 10 yrs.    10/4/2010 11:58 AM - Demond Knight MD  diverticulosis. single hyperplastic rectal polyp  Bone density scan (982656445) in  at 62 Years.  Arthroscopy of knee (763347474) in  at 60 Years.  Comments:  2013 12:08 PM - Michelle Zhao  Left knee.  Flexible sigmoidoscopy (08513425) on 1999 at 59 Years.  Arthroscopy of Knee (80.26) in  at 58 Years.   section (61099635).  Comments:  2013 11:52 AM - Michelle Zhao  1960's    2010 12:42 PM - Mila Brush   3, Para 3   Social History:        Alcohol Assessment: Denies Alcohol Use            Never      Tobacco Assessment: Past            Past, Cigarettes, 15 per day.  Started age 19 Years.  Stopped age 44 Years.      Substance Abuse Assessment: Denies Substance Abuse      Employment and Education Assessment            Retired      Home and Environment Assessment            Marital status: .  3 children.      Other Assessment            Marital status,         Physical Examination   Vital Signs   2018 1:00 PM CDT Temperature Tympanic 97.2 DegF  LOW    Peripheral Pulse Rate 66 bpm    Pulse Site Radial artery    HR Method Manual    Systolic Blood Pressure 110 mmHg    Diastolic Blood Pressure 60 mmHg    Mean Arterial Pressure 77 mmHg    BP Site Right arm    BP Method Manual       Measurements from flowsheet : Measurements   9/18/2018 1:00 PM CDT Height Measured - Standard 64 in    Weight Measured - Standard 193.4 lb    BSA 1.99 m2    Body Mass Index 33.19 kg/m2  HI      General:  Alert and oriented, No acute distress.    Eye:  Pupils are equal, round and reactive to light, Normal conjunctiva.    Respiratory:  Lungs are clear to auscultation, Respirations are non-labored, Breath sounds are equal.    Cardiovascular:  Normal rate, Regular rhythm, No murmur.    Lymphatics:  B/L LE edema, right greater than left. 2+ edema on right, 1+ on left. Mild erythema over right anterior shin with no induration.    Musculoskeletal:  Strength 3/5 in right LE, 5/5 LLE.    Integumentary:  Warm, Dry, Pink, No pallor.    Neurologic:  Alert, Oriented, Residual right-sided hemideficits.       Review / Management   Results review:  Lab results   9/11/2018 8:43 AM CDT Sodium Level 140 mmol/L    Potassium Level 4.4 mmol/L    Chloride Level 106 mmol/L    CO2 Level 27 mmol/L    Glucose Level 116 mg/dL  HI    BUN 15 mg/dL    Creatinine 0.69 mg/dL    BUN/Creat Ratio NOT APPLICABLE    eGFR 85 mL/min/1.73m2    eGFR African American 98 mL/min/1.73m2    Calcium Level 9.3 mg/dL    Hgb A1c 6.2  HI   6/15/2018 11:46 AM CDT Sodium Level 139 mmol/L    Potassium Level 4.3 mmol/L    Chloride Level 106 mmol/L    CO2 Level 23 mmol/L    Glucose Level 143 mg/dL  HI    BUN 12 mg/dL    Creatinine 0.70 mg/dL    BUN/Creat Ratio NOT APPLICABLE    eGFR 84 mL/min/1.73m2    eGFR African American 98 mL/min/1.73m2    Calcium Level 9.5 mg/dL    WBC 6.0    RBC 4.18    Hgb 13.5 gm/dL    Hct 39.2 %    MCV 93.8 fL    MCH 32.3 pg    MCHC 34.4 gm/dL    RDW 12.4 %    Platelet 179    MPV 11.3 fL   .       Impression and Plan   Diagnosis     Diabetes Mellitus (PFT63-AH E11.9).     Chronic Venous Insufficiency (JKZ58-EL I87.2).     Pain of left heel (PCF75-JH M79.672).     Chronic Venous Insufficiency (GGX73-MT I87.2).     Mixed hyperlipidemia (RLK91-TM  E78.2).     Orders     Orders (Selected)   Prescriptions  Prescribed  Ditropan XL 10 mg/24 hr oral tablet, extended release: = 1 tab(s) ( 10 mg ), PO, Daily, # 90 tab(s), 3 Refill(s), Type: Maintenance, Pharmacy: [facility] #[id number], 1 tab(s) Oral daily  Lasix 40 mg oral tablet: = 1 tab(s) ( 40 mg ), PO, Daily, # 90 tab(s), 3 Refill(s), Type: Maintenance, Pharmacy: [facility] #[id number], 1 tab(s) Oral daily  atorvastatin 80 mg oral tablet: = 1 tab(s) ( 80 mg ), po, daily, # 90 tab(s), 3 Refill(s), Type: Maintenance, Pharmacy: [facility] #[id number], 1 tab(s) Oral daily  custom fit diabetic shoes: custom fit diabetic shoes, See Instructions, Instructions: wear daily for heel pain, neuropathy, venous insufficiency, Supply, # 2 EA, 0 Refill(s), Type: Maintenance, Pharmacy: [facility], wear daily for heel pain, neuropathy, venous insufficiency  metFORMIN 500 mg oral tablet: = 1 tab(s) ( 500 mg ), PO, BID, # 180 tab(s), 3 Refill(s), Type: Maintenance, Pharmacy: [facility] #[id number], 1 tab(s) Oral bid  moldable orthotics: moldable orthotics, See Instructions, Instructions: use daily with diabetic shoes, Supply, # 6 EA, 0 Refill(s), Type: Maintenance, Pharmacy: [facility], use daily with diabetic shoes  potassium chloride 20 mEq oral tablet, extended release: = 1 tab(s) ( 20 mEq ), PO, daily, # 90 tab(s), 3 Refill(s), Type: Maintenance, Pharmacy: [id number], 1 tab(s) Oral daily.

## 2022-02-15 NOTE — PROGRESS NOTES
Patient:   PAKO BARBER            MRN: [id number]            FIN: [id number]               Age:   78 years     Sex:  Female     :  1940   Associated Diagnoses:   Obesity; Diabetes mellitus, type 2; Dizzy   Author:   Jan York PA-C      Report Summary   Diagnosis  Obesity (HNI41-ZT E66.9).  Patient InstructionsOrders   Visit Information   Visit type:  General concerns.    Accompanied by:  No one.    Source of history:  Self.    Referral source:  Self.    History limitation:  None.       Chief Complaint   1/15/2019 8:38 AM CST    Speach is off, hard to get out of bed, dizzy, thinking to speaking is difficult        History of Present Illness             The patient presents with dizziness.  The dizziness is described as feeling off-balance.  The severity of the dizziness is mild.  The dizziness is episodic.  The dizziness has lasted for 2 month(s).  feels off balance at times. No falls. Struggles with word finding. Has had CVA in  with right sided deficits. Has ischemic event in Oct. 2018. No apparent new symptoms. No chest pain. Wants blood work. CC above noted and confirmed with the patient..        Review of Systems   Constitutional:  Negative.    Eye:  Negative.    Ear/Nose/Mouth/Throat:  Negative.    Respiratory:  Negative.    Cardiovascular:  Negative.    Gastrointestinal:  Negative.    Neurologic:  Negative except as documented in history of present illness.    Psychiatric:  Negative.       Health Status   Allergies:    Allergic Reactions (All)  Severity Not Documented  Latex (No reactions were documented)  Opthmolic eye drops (Swelling of eye..)  Vicodin (Behavioral disturbances)  Zocor (Diarrhea)   Medications:  (Selected)   Prescriptions  Prescribed  Ditropan XL 10 mg/24 hr oral tablet, extended release: = 1 tab(s) ( 10 mg ), PO, Daily, # 90 tab(s), 3 Refill(s), Type: Maintenance, Pharmacy: [id number], 1 tab(s) Oral daily  Lasix 40 mg oral tablet: = 1 tab(s) ( 40 mg ), PO,  Daily, # 90 tab(s), 3 Refill(s), Type: Maintenance, Pharmacy: [facility] #[id number], 1 tab(s) Oral daily  atorvastatin 80 mg oral tablet: = 1 tab(s) ( 80 mg ), po, daily, # 90 tab(s), 3 Refill(s), Type: Maintenance, Pharmacy: [id number], 1 tab(s) Oral daily  custom fit diabetic shoes: custom fit diabetic shoes, See Instructions, Instructions: wear daily for heel pain, neuropathy, venous insufficiency, Supply, # 2 EA, 0 Refill(s), Type: Maintenance, Pharmacy: [facility], wear daily for heel pain, neuropathy, venous insufficiency  metFORMIN 500 mg oral tablet: = 1 tab(s) ( 500 mg ), PO, BID, # 180 tab(s), 3 Refill(s), Type: Maintenance, Pharmacy: [id number], 1 tab(s) Oral bid  moldable orthotics: moldable orthotics, See Instructions, Instructions: use daily with diabetic shoes, Supply, # 6 EA, 0 Refill(s), Type: Maintenance, Pharmacy: [facility], use daily with diabetic shoes  potassium chloride 20 mEq oral tablet, extended release: = 1 tab(s) ( 20 mEq ), PO, daily, # 90 tab(s), 3 Refill(s), Type: Maintenance, Pharmacy: [id number], 1 tab(s) Oral daily  triamcinolone 0.1% topical ointment: 1 jonathan, top, tid, Instructions: apply a thin film  to affected area  Area needs to be dry, # 60 gm, 1 Refill(s), Type: Maintenance, Pharmacy: [id number], 1 jonathan top tid,Instr:apply a thin film; to affected area; Area needs to be dry  Documented Medications  Documented  Daily Multiple Vitamins: PO, Daily, 0  Tylenol: PO, PRN: as needed for pain, 0 Refill(s), Type: Maintenance  Vitamin D3 1000 intl units oral capsule: 1 cap(s) ( 1,000 International Unit ), PO, daily, 0 Refill(s), Type: Soft Stop  aspirin 81 mg oral tablet: 1 tab(s) ( 81 mg ), PO, Daily, # 30 tab(s), 0 Refill(s), Type: Maintenance   Problem list:    All Problems (Selected)  Varicose Veins of Legs / ICD-9-.9 / Confirmed  Polio / ICD-9-.90 / Confirmed  Osteoporosis / ICD-9-.00 / Confirmed  Obesity / ICD-9-CM  278.00 / Probable  OA (Osteoarthritis) / ICD-9-.90 / Confirmed  Mixed hyperlipidemia / SNOMED CT 247017782 / Confirmed  Metabolic Syndrome / ICD-9-.7 / Confirmed  Hyperplastic Colonic Polyp / SNOMED CT 759123291 / Confirmed  EDEMA / ICD-9-.3 / Confirmed  Diabetes mellitus, type 2 / SNOMED CT 727839259 / Confirmed  Diabetes mellitus with diabetic neuropathy / SNOMED CT 603614257 / Confirmed  CVA, old, cognitive deficits / ICD-9-.0 / Confirmed  Chronic Venous Insufficiency / ICD-9-.81 / Confirmed  Buttock Pain / ICD-9-.1 / Confirmed      Histories   Past Medical History:    Active  Hyperplastic Colonic Polyp (908119011): Onset in 2004 at 64 years.  Buttock Pain (729.1): Onset in 2004 at 63 years.  Comments:  5/8/2013 CDT 11:24 AM Michelle Preston  Work-related fall.  Chronic left buttock pain since then.  OA (Osteoarthritis) (715.90)  Osteoporosis (733.00)  EDEMA (782.3)  Metabolic Syndrome (277.7)  Polio (045.90)  Chronic Venous Insufficiency (459.81)  Varicose Veins of Legs (454.9)  Resolved  Inpatient stay (857320111): Onset on 10/10/2018 at 78 years.  Resolved on 10/13/2018 at 78 years.  Comments:  10/18/2018 CDT 9:42 AM BOBT Heike Marzena [facility] [place] - Acute and chronic right-sided weakness.  Fracture of Wrist (814.00): Onset in 2004 at 63 years.  Resolved.  Comments:  5/8/2013 CDT 11:22 AM Michelle Preston  Left    5/8/2013 CDT 11:25 AM Michelle Preston  Work-related fall.  Stroke (436): Onset on 7/14/2003 at 63 years.  Resolved.  Comments:  5/8/2013 CDT 12:03 PM Michelle Preston  Acute CVA with right-sided weakness.  Tobacco user (305.1):  Resolved.  Comments:  5/8/2013 CDT 11:46 AM Michelle Preston  Patient states that she quit in 1989.   Family History:    Heart disease  Mother  Grandmother (M)  Brother  Bladder cancer..  Mother     Procedure history:    Extracapsular cataract extraction and insertion of intraocular lens (143034618) on 9/15/2016 at 76  Years.  Comments:  10/10/2016 1:01 PM Rachel Morales  Right.  Extracapsular cataract extraction and insertion of intraocular lens (090873722) on 2016 at 76 Years.  Comments:  2016 2:37 PM Rachel Morales  Left.  Colonoscopy (831819042) on 2014 at 74 Years.  Comments:  2014 9:16 AM Demond Mccoy MD  Moderate left diberticulosis. No polyps. Repeat 10 years.  Sclerotherapy of vein of lower limb (3858437053) in  at 70 Years.  Comments:  2013 11:40 AM Michelle Preston  Left leg in 2010.  Right leg in 2010.  [facility].  Laser ablation of long saphenous vein (8043746451) on 8/3/2009 at 69 Years.  Comments:  2013 11:45 AM Michelle Preston  Right great and right accessory saphenous veins.  Fluoroscopically-guided needle placement for injection of ischial bursa on the left. on 10/10/2008 at 68 Years.  Comments:  2013 11:49 AM Michelle Preston  [facility].  Bone density scan (541348603) on 2007 at 67 Years.  Comments:  2013 11:58 AM Michelle Preston  Osteopenia  Fracture of Wrist (814.00) in  at 64 Years.  Comments:  2013 11:54 AM Michelle Preston  Left  Colonoscopy (435943443) on 2004 at 63 Years.  Comments:  2011 7:35 AM Rachel Morales  Repeat in 10 yrs.    10/4/2010 11:58 AM Demond Mccoy MD  diverticulosis. single hyperplastic rectal polyp  Bone density scan (848449553) in  at 62 Years.  Arthroscopy of knee (397973851) in  at 60 Years.  Comments:  2013 12:08 PM Michelle Preston  Left knee.  Flexible sigmoidoscopy (07305667) on 1999 at 59 Years.  Arthroscopy of Knee (80.26) in  at 58 Years.   section (82122351).  Comments:  2013 11:52 AM CDT - Pavel , Michelle  1960's    2010 12:42 PM CST - Mila Brush   3, Para 3      Physical Examination   Vital Signs   1/15/2019 8:38 AM CST Temperature Tympanic 97.6 DegF  LOW    Peripheral Pulse Rate 72 bpm    Pulse Site  Radial artery    HR Method Manual    Systolic Blood Pressure 124 mmHg    Diastolic Blood Pressure 68 mmHg    Mean Arterial Pressure 87 mmHg    BP Site Right arm    BP Method Manual      Measurements from flowsheet : Measurements   1/15/2019 8:38 AM CST Height Measured - Standard 64 in    Weight Measured - Standard 193.6 lb    BSA 1.99 m2    Body Mass Index 33.23 kg/m2  HI      General:  Alert and oriented, No acute distress.    Eye:  Normal conjunctiva.    HENT:  Oral mucosa is moist, No pharyngeal erythema.    Neck:  Supple, Non-tender, No lymphadenopathy.    Respiratory:  Lungs are clear to auscultation, Respirations are non-labored.    Cardiovascular:  Normal rate, Regular rhythm.    Musculoskeletal:  Walks with cane. Right sided weakness noted. Some contracture of the right hand..       Impression and Plan   Diagnosis     Obesity (HGB43-DO E66.9).     Diabetes mellitus, type 2 (FXU59-OV E11.9).     Dizzy (JMI77-SB R42).     Patient Instructions:       Counseled: Patient, Regarding diagnosis, Regarding treatment, Regarding medications, Diet, Activity.    Orders     Orders (Selected)   Outpatient Orders  Ordered (Dispatched)  CBC (includes diff/plt)* ([facility]): Specimen Type: Blood, Collection Date: 01/15/19 8:56:00 CST  Comprehensive Metabolic Panel* ([facility]): Specimen Type: Serum, Collection Date: 01/15/19 8:56:00 CST  Hemoglobin A1c* ([facility]): Specimen Type: Blood, Collection Date: 01/15/19 8:56:00 CST.     Treat any abnormalities in blood work. If all acceptable, will consider some PT.

## 2022-02-15 NOTE — LETTER
(Inserted Image. Unable to display)   January 02, 2020      PAKO BARBER  [address]   [place], [place] [id number]        Dear PAKO,      Thank you for selecting [facility] (previously [facility]) for your healthcare needs.     Our records indicate you are due for the following services:     Non-Fasting Lab    If you had your labs done at another facility or with [facility] at [facility], please bring in a copy of the results to your next visit, mail a copy, or drop off a copy of your results to your Healthcare Provider.    To schedule an appointment or if you have further questions, please contact your primary clinic:   [facility]          [phone]   Critical access hospital    [phone]             [facility]         [phone]      Powered by [employer]    Sincerely,    Richard Conde M.D.

## 2022-02-15 NOTE — TELEPHONE ENCOUNTER
Entered by Taylor De La Paz on March 02, 2021 8:11:58 AM CST  Potassium Chloride     LV 12/9/20 upper extremity   LF 1/20/20 #180 R 3  RTC 10/28/20, return in 6 months     Per protocol, filled for #30.      ** Patient matched by Taylor De La Paz on 3/2/2021 8:02:08 AM CST **      ------------------------------------------  From: [facility]  To: Richard Conde MD  Sent: February 27, 2021 9:13:36 AM CST  Subject: Medication Management  Due: February 19, 2021 9:56:32 PM CST     ** On Hold Pending Signature **     Drug: potassium chloride (Potassium Chloride (Eqv-Klor-Con M20) 20 mEq oral tablet, extended release), TAKE ONE TABLET BY MOUTH TWICE DAILY  Quantity: 180 tab(s)  Days Supply: 90  Refills: 0  Substitutions Allowed  Notes from Pharmacy:     Dispensed Drug: potassium chloride (Potassium Chloride (Eqv-Klor-Con M20) 20 mEq oral tablet, extended release), TAKE ONE TABLET BY MOUTH TWICE DAILY  Quantity: 180 tab(s)  Days Supply: 90  Refills: 1  Substitutions Allowed  Notes from Pharmacy:  ---------------------------------------------------------------  From: Taylor eD La Paz   To: [facility] [employer] [facility]  Kelly    Sent: 3/2/2021 8:12:49 AM CST  Subject: Medication Management     ** Submitted: **  Order:potassium chloride (Potassium Chloride (Eqv-Klor-Con M20) 20 mEq oral tablet, extended release)  See Instructions  TAKE ONE TABLET BY MOUTH TWICE DAILY  Qty:  60 tab(s)        Refills:  0          Substitutions Allowed     Route To Pharmacy - [facility] [employer] [facility]    Signed by Taylor De La Paz  3/2/2021 2:12:00 PM [facility]    ** Submitted: **  Complete:potassium chloride (Klor-Con M20 oral tablet, extended release)   Signed by Taylor De La Paz  3/2/2021 2:12:00 PM [facility]    ** Not Approved:  **  potassium chloride (potassium chloride ER 20 mEq tablet,extended release(part/cryst))  TAKE ONE TABLET BY MOUTH TWICE  DAILY  Qty:  180 tab(s)        Days Supply:  90        Refills:  1          Substitutions Allowed     Route To Pharmacy - [facility]   Signed by Aniya/Taylor LOZOYA

## 2022-02-15 NOTE — PROGRESS NOTES
Patient:   PAKO BARBER            MRN: [id number]            FIN: [id number]               Age:   77 years     Sex:  Female     :  1940   Associated Diagnoses:   Headache; Varicose Veins of Legs; Diabetes mellitus with diabetic neuropathy; Chronic Venous Insufficiency   Author:   Richard Conde MD      Chief Complaint   2018 9:07 AM CDT    f/u headaches - better with prednisone, but have returned since finishing rx; review labs      History of Present Illness   1. headache follow up. Seen last month with shooting severe headache on left side, workup normal, treated with prednisone. Patient notes that symptoms completely resolved and did well until 2 days ago when started noticing some return. Aleve treats pain. Taking 220mg twice a day. Wondering if she can continue that.   2. diabetes follow up, A1c 7.2 in December, due for recheck in , notes that she will need shoes. Has peripheral neuropathy along with edema and other changes related to calluses and onchodystrophy. Also has documented venous stasis. Redness persists in right shin, unchanged  3. peripheral edema related to venous stasis. Compression stockings are not particularly effective - notes current pairs are old and worn. Would like to update those at the same time she does the shoes.      Health Status   Allergies:    Allergic Reactions (All)  Severity Not Documented  Latex (No reactions were documented)  Opthmolic eye drops (Swelling of eye..)  Vicodin (Behavioral disturbances)  Zocor (Diarrhea)   Medications:  (Selected)   Prescriptions  Prescribed  Ditropan XL 10 mg/24 hr oral tablet, extended release: 1 tab(s) ( 10 mg ), PO, Daily, # 90 tab(s), 1 Refill(s), Type: Maintenance, Pharmacy: [facility] #[id number], 1 tab(s) po daily  Lasix 20 mg oral tablet: 1 tab(s) ( 20 mg ), PO, Daily, # 90 tab(s), 1 Refill(s), Type: Maintenance, Pharmacy: [facility] #[id number], 1 tab(s) po daily  MOLDABLE ORTHOTICS MG APPLY: See Instructions,  Instructions: USE DAILY WITH DIABETIC SHOES E11.9, # 6 unknown unit, Type: Soft Stop, Pharmacy: [facility], USE DAILY WITH DIABETIC SHOES E11.9  atorvastatin 80 mg oral tablet: 1 tab(s) ( 80 mg ), po, daily, # 90 tab(s), 1 Refill(s), Type: Maintenance, Pharmacy: [facility] #[id number], 1 tab(s) po daily  custom fit diabetic shoes: custom fit diabetic shoes, See Instructions, Instructions: wear daily for heel pain, neuropathy, venous insufficiency, Supply, # 2 EA, 0 Refill(s), Type: Maintenance  triamcinolone 0.1% topical ointment: 1 jonathan, top, tid, Instructions: apply a thin film  to affected area  Area needs to be dry, # 60 gm, 1 Refill(s), Type: Maintenance, Pharmacy: [facility] #[id number], 1 jonathan top tid,Instr:apply a thin film; to affected area; Area needs to be dry  Documented Medications  Documented  Daily Multiple Vitamins: PO, Daily, 0  Tylenol: PO, PRN: as needed for pain, 0 Refill(s), Type: Maintenance  Vitamin D3 1000 intl units oral capsule: 1 cap(s) ( 1,000 International Unit ), PO, daily, 0 Refill(s), Type: Soft Stop  aspirin 81 mg oral tablet: 1 tab(s) ( 81 mg ), PO, Daily, # 30 tab(s), 0 Refill(s), Type: Maintenance,    Medications          *denotes recorded medication          MOLDABLE ORTHOTICS MG APPLY: See Instructions, USE DAILY WITH DIABETIC SHOES E11.9, 6 unknown unit.          custom fit diabetic shoes: See Instructions, wear daily for heel pain, neuropathy, venous insufficiency, 2 EA, 0 Refill(s).          *Tylenol: PO, PRN: as needed for pain.          *aspirin 81 mg oral tablet: 81 mg, 1 tab(s), PO, Daily, 30 tab(s).          atorvastatin 80 mg oral tablet: 80 mg, 1 tab(s), po, daily, 90 tab(s), 1 Refill(s).          *Vitamin D3 1000 intl units oral capsule: 1,000 International Unit, 1 cap(s), PO, daily.          Lasix 20 mg oral tablet: 20 mg, 1 tab(s), PO, Daily, 90 tab(s), 1 Refill(s).          *Daily Multiple Vitamins: PO, Daily.          Ditropan XL 10 mg/24 hr oral tablet, extended  release: 10 mg, 1 tab(s), PO, Daily, 90 tab(s), 1 Refill(s).          triamcinolone 0.1% topical ointment: 1 jonathan, top, tid, apply a thin film  to affected area  Area needs to be dry, 60 gm, 1 Refill(s).     Problem list:    All Problems (Selected)  Buttock Pain / ICD-9-.1 / Confirmed  Chronic Venous Insufficiency / ICD-9-.81 / Confirmed  CVA, old, cognitive deficits / ICD-9-.0 / Confirmed  Diabetes Mellitus / ICD-9-.00 / Confirmed  EDEMA / ICD-9-.3 / Confirmed  Hyperplastic Colonic Polyp / SNOMED CT 909462447 / Confirmed  Metabolic Syndrome / ICD-9-.7 / Confirmed  Mixed hyperlipidemia / SNOMED CT 634278937 / Confirmed  OA (Osteoarthritis) / ICD-9-.90 / Confirmed  Obesity / ICD-9-.00 / Probable  Osteoporosis / ICD-9-.00 / Confirmed  Polio / ICD-9-.90 / Confirmed  Varicose Veins of Legs / ICD-9-.9 / Confirmed      Histories   Past Medical History:    Active  Diabetes Mellitus (ICD-9-.00): Onset on 5/1/2007 at 66 years.  Comments:  5/8/2013 CDT 11:12 AM BOBT - Michelle Zhao  Type 2  Hyperplastic Colonic Polyp (SNOMED CT 651914803): Onset in 2004 at 64 years.  Buttock Pain (ICD-9-.1): Onset in 2004 at 63 years.  Comments:  5/8/2013 CDT 11:24 AM MARTIN - Michelle Zhao  Work-related fall.  Chronic left buttock pain since then.  OA (Osteoarthritis) (ICD-9-.90)  Osteoporosis (ICD-9-.00)  EDEMA (ICD-9-.3)  Metabolic Syndrome (ICD-9-.7)  Polio (ICD-9-.90)  Chronic Venous Insufficiency (ICD-9-.81)  Varicose Veins of Legs (ICD-9-.9)  Resolved  Fracture of Wrist (ICD-9-.00): Onset in 2004 at 63 years.  Resolved.  Comments:  5/8/2013 CDT 11:22 AM Michelle Preston  Left    5/8/2013 CDT 11:25 AM Michelle Preston  Work-related fall.  Stroke (ICD-9-): Onset on 7/14/2003 at 63 years.  Resolved.  Comments:  5/8/2013 CDT 12:03 PM Michelle Preston  Acute CVA with right-sided weakness.  Tobacco user (ICD-9-CM  305.1):  Resolved.  Comments:  2013 CDT 11:46 AM CDT - Michelle Zhao  Patient states that she quit in .   Family History:    Heart disease  Mother  Grandmother (M)  Brother  Bladder cancer..  Mother     Procedure history:    Extracapsular cataract extraction and insertion of intraocular lens ([id number]) on 9/15/2016 at 76 Years.  Comments:  10/10/2016 1:01 PM - Rachel Ivan  Right.  Extracapsular cataract extraction and insertion of intraocular lens (196035874) on 2016 at 76 Years.  Comments:  2016 2:37 PM - Rachel Ivan  Left.  Colonoscopy (596060749) on 2014 at 74 Years.  Comments:  2014 9:16 AM - Demond Knight MD  Moderate left diberticulosis. No polyps. Repeat 10 years.  Sclerotherapy of vein of lower limb (1781328029) in  at 70 Years.  Comments:  2013 11:40 Michelle Kauffman  Left leg in 2010.  Right leg in 2010.  [facility].  Laser ablation of long saphenous vein ([id number]) on 8/3/2009 at 69 Years.  Comments:  2013 11:45 Michelle Kauffman  Right great and right accessory saphenous veins.  Fluoroscopically-guided needle placement for injection of ischial bursa on the left. on 10/10/2008 at 68 Years.  Comments:  2013 11:49 Michelle Kauffman  [facility].  Bone density scan (720307130) on 2007 at 67 Years.  Comments:  2013 11:58 Michelle Kauffman  Osteopenia  Fracture of Wrist (814.00) in  at 64 Years.  Comments:  2013 11:54 Michelle Kauffman  Left  Colonoscopy ([id number]) on 2004 at 63 Years.  Comments:  2011 7:35 AM - Rachel Ivan  Repeat in 10 yrs.    10/4/2010 11:58 Demond Sykes MD  diverticulosis. single hyperplastic rectal polyp  Bone density scan (860409752) in  at 62 Years.  Arthroscopy of knee (359250536) in  at 60 Years.  Comments:  2013 12:08 PM - Michelle Zhao  Left knee.  Flexible sigmoidoscopy ([id number]) on 1999 at 59 Years.  Arthroscopy of Knee (80.26) in  at 58 Years.    section ([id number]).  Comments:  2013 11:52 AM - Michelle Zhao's    2010 12:42 PM - Mila Brush   3, Para 3   Social History:        Alcohol Assessment: Denies Alcohol Use            Never      Tobacco Assessment: Past            Past, Cigarettes, 15 per day.  Started age 19 Years.  Stopped age 44 Years.      Substance Abuse Assessment: Denies Substance Abuse      Employment and Education Assessment            Retired      Home and Environment Assessment            Marital status: .  3 children.      Other Assessment            Marital status,         Physical Examination   Vital Signs   2018 9:07 AM CDT Temperature Tympanic 97.3 DegF  LOW    Peripheral Pulse Rate 68 bpm    Pulse Site Radial artery    HR Method Manual    Systolic Blood Pressure 128 mmHg    Diastolic Blood Pressure 64 mmHg    Mean Arterial Pressure 85 mmHg    BP Site Right arm    BP Method Manual      Measurements from flowsheet : Measurements   2018 9:07 AM CDT Height Measured - Standard 64 in    Weight Measured - Standard 201.8 lb    BSA 2.03 m2    Body Mass Index 34.64 kg/m2  HI      General:  Alert and oriented, No acute distress.    Eye:  Extraocular movements are intact, Normal conjunctiva.    HENT:  Normocephalic, Oral mucosa is moist.    Neck:  Supple, Non-tender, No lymphadenopathy.    Respiratory:  Lungs are clear to auscultation, Respirations are non-labored.    Cardiovascular:  Normal rate, Regular rhythm, 3+ edema right leg, 2+ left leg.    Integumentary:  venous stasis changes to shins, right more than left.    Neurologic:  Cranial Nerves II-XII are grossly intact.       Review / Management   Results review:  Lab results   3/26/2018 3:24 PM CDT Sed Rate TR 26 mm/hr   3/26/2018 2:20 PM CDT WBC TR 5.3 x10^3/uL    RBC TR 4.13 x10^6/uL    Hgb TR 12.5 g/dL    Hct TR 40.4 %    MCV TR 97.7 fL    MCH TR 30.2 pg    MCHC TR 31.0 gm/dL    RDW TR 13.0 %    Platelet  x10^3/uL   3/26/2018 2:12 PM CDT  Sodium Level 140 mmol/L    Potassium Level 3.9 mmol/L    Chloride Level 107 mmol/L    CO2 Level 25 mmol/L    Glucose Level 123 mg/dL  HI    BUN 15 mg/dL    Creatinine Level 0.70 mg/dL    BUN/Creat Ratio NOT APPLICABLE    eGFR 85 mL/min/1.73m2    eGFR African American 98 mL/min/1.73m2    Calcium Level 9.2 mg/dL   .       Impression and Plan   Diagnosis     Headache (GEX43-IK R51).     Plan:  seems triggered by musculoskeletal issues, will treat with NSAIDs. If not improving, consider PT vs neurology consult.    Diagnosis     Varicose Veins of Legs (CJN26-HM I83.93).     Diabetes mellitus with diabetic neuropathy (RRO33-SP E11.40).     Chronic Venous Insufficiency (OFG57-VM I87.2).     Course:  Labs due in June. Will do compression stockings and diabetic shoe order next month. Patient to call to get prescription after May 7th..

## 2022-02-15 NOTE — PROGRESS NOTES
Patient:   PAKO BARBER            MRN: [id number]            FIN: [id number]               Age:   77 years     Sex:  Female     :  1940   Associated Diagnoses:   Skin lesion; Contusion   Author:   Richard Conde MD      Chief Complaint   3/5/2018 9:39 AM CST     check R leg redness      History of Present Illness   Patient here with several concerns.  Had a pan fall and strike her shin. Has noticed some discoloration on shin.   Also has lesion on right side of nose that is getting bigger, gets caught on glasses.          Health Status   Allergies:    Allergic Reactions (All)  Severity Not Documented  Latex (No reactions were documented)  Opthmolic eye drops (Swelling of eye..)  Vicodin (Behavioral disturbances)  Zocor (Diarrhea)   Medications:  (Selected)   Prescriptions  Prescribed  Ditropan XL 10 mg/24 hr oral tablet, extended release: 1 tab(s) ( 10 mg ), PO, Daily, # 90 tab(s), 1 Refill(s), Type: Maintenance, Pharmacy: [id number], 1 tab(s) po daily  Lasix 20 mg oral tablet: 1 tab(s) ( 20 mg ), PO, Daily, # 90 tab(s), 1 Refill(s), Type: Maintenance, Pharmacy: [facility] #[id number], 1 tab(s) po daily  MOLDABLE ORTHOTICS MG APPLY: See Instructions, Instructions: USE DAILY WITH DIABETIC SHOES E11.9, # 6 unknown unit, Type: Soft Stop, Pharmacy: [facility], USE DAILY WITH DIABETIC SHOES E11.9  atorvastatin 80 mg oral tablet: 1 tab(s) ( 80 mg ), po, daily, # 90 tab(s), 1 Refill(s), Type: Maintenance, Pharmacy: [facility] #[id number], 1 tab(s) po daily  custom fit diabetic shoes: custom fit diabetic shoes, See Instructions, Instructions: wear daily for heel pain, neuropathy, venous insufficiency, Supply, # 2 EA, 0 Refill(s), Type: Maintenance  triamcinolone 0.1% topical ointment: 1 jonathan, top, tid, Instructions: apply a thin film  to affected area  Area needs to be dry, # 60 gm, 1 Refill(s), Type: Maintenance, Pharmacy: [id number], 1 jonathan top tid,Instr:apply a thin film; to affected area;  Area needs to be dry  Documented Medications  Documented  Daily Multiple Vitamins: PO, Daily, 0  Tylenol: PO, PRN: as needed for pain, 0 Refill(s), Type: Maintenance  Vitamin D3 1000 intl units oral capsule: 1 cap(s) ( 1,000 International Unit ), PO, daily, 0 Refill(s), Type: Soft Stop  aspirin 81 mg oral tablet: 1 tab(s) ( 81 mg ), PO, Daily, # 30 tab(s), 0 Refill(s), Type: Maintenance   Problem list:    All Problems (Selected)  Hyperplastic Colonic Polyp / SNOMED CT 798231377 / Confirmed  Buttock Pain / ICD-9-.1 / Confirmed  Chronic Venous Insufficiency / ICD-9-.81 / Confirmed  CVA, old, cognitive deficits / ICD-9-.0 / Confirmed  Diabetes Mellitus / ICD-9-.00 / Confirmed  EDEMA / ICD-9-.3 / Confirmed  Metabolic Syndrome / ICD-9-.7 / Confirmed  Mixed hyperlipidemia / SNOMED CT 241411275 / Confirmed  OA (Osteoarthritis) / ICD-9-.90 / Confirmed  Obesity / ICD-9-.00 / Probable  Osteoporosis / ICD-9-.00 / Confirmed  Polio / ICD-9-.90 / Confirmed  Varicose Veins of Legs / ICD-9-.9 / Confirmed      Histories   Past Medical History:    Active  Diabetes Mellitus (ICD-9-.00): Onset on 5/1/2007 at 66 years.  Comments:  5/8/2013 CDT 11:12 AM Michelle Preston  Type 2  Hyperplastic Colonic Polyp (SNOMED CT 066226828): Onset in 2004 at 64 years.  Buttock Pain (ICD-9-.1): Onset in 2004 at 63 years.  Comments:  5/8/2013 CDT 11:24 AM Michelle Preston  Work-related fall.  Chronic left buttock pain since then.  OA (Osteoarthritis) (ICD-9-.90)  Osteoporosis (ICD-9-.00)  EDEMA (ICD-9-.3)  Metabolic Syndrome (ICD-9-.7)  Polio (ICD-9-.90)  Chronic Venous Insufficiency (ICD-9-.81)  Varicose Veins of Legs (ICD-9-.9)  Resolved  Fracture of Wrist (ICD-9-.00): Onset in 2004 at 63 years.  Resolved.  Comments:  5/8/2013 CDT 11:22 AM Michelle Preston  Left    5/8/2013 CDT 11:25 AM Michelle Preston  Work-related  fall.  Stroke (ICD-9-): Onset on 7/14/2003 at 63 years.  Resolved.  Comments:  5/8/2013 CDT 12:03 PM CDT - Michelle Zhao  Acute CVA with right-sided weakness.  Tobacco user (ICD-9-.1):  Resolved.  Comments:  5/8/2013 CDT 11:46 AM CDT - Michelle Zhao  Patient states that she quit in 1989.   Family History:    Heart disease  Mother  Grandmother (M)  Brother  Bladder cancer..  Mother     Procedure history:    Extracapsular cataract extraction and insertion of intraocular lens ([id number]) on 9/15/2016 at 76 Years.  Comments:  10/10/2016 1:01 PM - Rachel Ivan  Right.  Extracapsular cataract extraction and insertion of intraocular lens ([id number]) on 9/1/2016 at 76 Years.  Comments:  9/8/2016 2:37 PM - Rachel Ivan  Left.  Colonoscopy ([id number]) on 6/25/2014 at 74 Years.  Comments:  6/25/2014 9:16 AM - Demond Knight MD  Moderate left diberticulosis. No polyps. Repeat 10 years.  Sclerotherapy of vein of lower limb (1895802729) in 2010 at 70 Years.  Comments:  5/8/2013 11:40 Michelle Kauffman  Left leg in 03/2010.  Right leg in 04/2010.  [facility].  Laser ablation of long saphenous vein ([id number]) on 8/3/2009 at 69 Years.  Comments:  5/8/2013 11:45 Michelle Kauffman  Right great and right accessory saphenous veins.  Fluoroscopically-guided needle placement for injection of ischial bursa on the left. on 10/10/2008 at 68 Years.  Comments:  5/8/2013 11:49 Michelle Kauffman  [facility].  Bone density scan (759819136) on 5/31/2007 at 67 Years.  Comments:  5/8/2013 11:58 Michelle Kauffman  Osteopenia  Fracture of Wrist (814.00) in 2004 at 64 Years.  Comments:  5/8/2013 11:54 Michelle Kauffman  Left  Colonoscopy ([id number]) on 4/1/2004 at 63 Years.  Comments:  8/8/2011 7:35 AM - Rachel Ivan  Repeat in 10 yrs.    10/4/2010 11:58 AM - Antonia DAMON, Demond  diverticulosis. single hyperplastic rectal polyp  Bone density scan (994411362) in 2002 at 62 Years.  Arthroscopy of knee (755462644) in 2000 at  60 Years.  Comments:  2013 12:08 PM - Michelle Zhao  Left knee.  Flexible sigmoidoscopy (46786262) on 1999 at 59 Years.  Arthroscopy of Knee (80.26) in  at 58 Years.   section ([id number]).  Comments:  2013 11:52 AM - Michelle Zaho  1960's    2010 12:42 PM - Mila Brush   3, Para 3      Physical Examination   Vital Signs   3/5/2018 9:39 AM CST Temperature Tympanic 97.6 DegF  LOW    Peripheral Pulse Rate 68 bpm    Pulse Site Radial artery    HR Method Manual    Systolic Blood Pressure 118 mmHg    Diastolic Blood Pressure 70 mmHg    Mean Arterial Pressure 86 mmHg    BP Site Right arm    BP Method Manual      Measurements from flowsheet : Measurements   3/5/2018 9:39 AM CST Height Measured - Standard 63 in    Weight Measured - Standard 202 lb    BSA 2.02 m2    Body Mass Index 35.78 kg/m2  HI      patient with irregular peduncular lesion on right side of nose  shin examined. Patient has healing bruise with mild surrounding discoloration, no redness or warmth         Procedure   Dermatology Surgical Procedure   Confirmed: patient, procedure, side, and site are correct, safety procedures followed.     Indication: remove lesion.     Procedure performed: shave biopsy.     Shave biopsy: sterile preparation of site with 70 % alcohol, Anesthesia 1% xylocaine, specimen obtained and sent to pathology, sutured using 6-0, nylon sutures, 2  sutures.     Complications: none.     Procedure tolerated: well.        Impression and Plan   Diagnosis     Skin lesion (CLZ78-JA L98.9).     Course:  Worsening.    Plan:  sutures out in 5-7 days. Will contact patient with pathology.    Diagnosis     Contusion (ICF78-WT T14.8XXA).     Plan:  reassured patient that no evidence of infection. should heal with time.

## 2022-02-15 NOTE — PROGRESS NOTES
Patient:   PAKO ABRBER            MRN: [id number]            FIN: [id number]               Age:   76 years     Sex:  Female     :  1940   Associated Diagnoses:   Tinea corporis   Author:   Richard Conde MD      Chief Complaint   2017 8:37 AM CST     c/o rashes under breasts x 2 months, pt has been off statin for 2 weeks thinking she has no refills, 1 year sent in 2016. Pt notified rx is at the pharmacy.  DM check Foot exam  LT,  RT      History of Present Illness   Patient with irritation and rashes under breasts, right worse than left.   Due for fasting labs.       Health Status   Allergies:    Allergic Reactions (All)  Severity Not Documented  Latex (No reactions were documented)  Opthmolic eye drops (Swelling of eye..)  Vicodin (Behavioral disturbances)  Zocor (Diarrhea)   Medications:  (Selected)   Prescriptions  Prescribed  Ditropan XL 10 mg/24 hr oral tablet, extended release: 1 tab(s) ( 10 mg ), PO, Daily, # 90 tab(s), 0 Refill(s), Type: Maintenance, Pharmacy: [facility] #[id number], 1 tab(s) po daily  Lipitor 80 mg oral tablet: 1 tab(s) ( 80 mg ), PO, Daily, # 90 tab(s), 3 Refill(s), Type: Maintenance, Pharmacy: [facility] #[id number], 1 tab(s) po daily  Documented Medications  Documented  Daily Multiple Vitamins: PO, Daily, 0  Tylenol: PO, PRN: as needed for pain, 0 Refill(s), Type: Maintenance  Vitamin D3 1000 intl units oral capsule: 1 cap(s) ( 1,000 International Unit ), PO, daily, 0 Refill(s), Type: Soft Stop  aspirin 81 mg oral tablet: 1 tab(s) ( 81 mg ), PO, Daily, # 30 tab(s), 0 Refill(s), Type: Maintenance   Problem list:    All Problems (Selected)  Hyperplastic Colonic Polyp / SNOMED CT 094141125 / Confirmed  Buttock Pain / ICD-9-.1 / Confirmed  Chronic Venous Insufficiency / ICD-9-.81 / Confirmed  CVA, old, cognitive deficits / ICD-9-.0 / Confirmed  Diabetes Mellitus / ICD-9-.00 / Confirmed  EDEMA / ICD-9-.3 / Confirmed  Metabolic Syndrome /  ICD-9-.7 / Confirmed  Mixed hyperlipidemia / SNOMED CT 910662673 / Confirmed  OA (Osteoarthritis) / ICD-9-.90 / Confirmed  Obesity / ICD-9-.00 / Probable  Osteoporosis / ICD-9-.00 / Confirmed  Polio / ICD-9-.90 / Confirmed  Varicose Veins of Legs / ICD-9-.9 / Confirmed      Histories   Past Medical History:    Active  Diabetes Mellitus (ICD-9-.00): Onset on 5/1/2007 at 66 years.  Comments:  5/8/2013 CDT 11:12 AM Michelle Preston  Type 2  Hyperplastic Colonic Polyp (SNOMED CT 506935026): Onset in 2004 at 64 years.  Buttock Pain (ICD-9-.1): Onset in 2004 at 63 years.  Comments:  5/8/2013 CDT 11:24 AM Michelle Preston  Work-related fall.  Chronic left buttock pain since then.  OA (Osteoarthritis) (ICD-9-.90)  Osteoporosis (ICD-9-.00)  EDEMA (ICD-9-.3)  Metabolic Syndrome (ICD-9-.7)  Polio (ICD-9-.90)  Chronic Venous Insufficiency (ICD-9-.81)  Varicose Veins of Legs (ICD-9-.9)  Resolved  Fracture of Wrist (ICD-9-.00): Onset in 2004 at 63 years.  Resolved.  Comments:  5/8/2013 CDT 11:22 AM Michelle Preston  Left    5/8/2013 CDT 11:25 AM Michelle Preston  Work-related fall.  Stroke (ICD-9-): Onset on 7/14/2003 at 63 years.  Resolved.  Comments:  5/8/2013 CDT 12:03 PM Michelle Preston  Acute CVA with right-sided weakness.  Tobacco user (ICD-9-.1):  Resolved.  Comments:  5/8/2013 CDT 11:46 AM Michelle Preston  Patient states that she quit in 1989.      Physical Examination   Vital Signs   2/2/2017 8:37 AM CST Temperature Temporal 97.6 DegF    Peripheral Pulse Rate 76 bpm    Pulse Site Radial artery    Systolic Blood Pressure 112 mmHg    Diastolic Blood Pressure 64 mmHg    Mean Arterial Pressure 80 mmHg    BP Site Right arm    BP Method Manual      Measurements from flowsheet : Measurements   2/2/2017 8:37 AM CST Height Measured - Standard 63 in    Weight Measured - Standard 203.2 lb    BSA 2.02 m2    Body Mass  Index 35.99 kg/m2      General:  Alert and oriented, No acute distress.    Integumentary:  patient with seborrheic keratoses under breasts, on right side inflamed and red.       Impression and Plan   Diagnosis     Tinea corporis (OYB32-EQ B35.4).     Orders     Orders   Pharmacy:  nystatin 100,000 units/g topical cream (Prescribe): 1 jonathan, TOP, TID, # 30 g, 2 Refill(s), Type: Maintenance, Pharmacy: [facility] #[id number], 1 jonathan top tid.

## 2022-02-15 NOTE — TELEPHONE ENCOUNTER
Entered by Negrito Abdul MD on October 27, 2020 12:33:54 PM CDT  ---------------------  From: Negrito Abdul MD   To: [facility]    Sent: 10/27/2020 12:33:54 PM CDT  Subject: Medication Management     ** Submitted: **  Complete:nystatin topical (nystatin 100,000 units/g topical powder)   Signed by Negrito Abdul MD  10/27/2020 5:33:00 PM [facility]    ** Approved with modifications: **  nystatin topical (nystatin 100,000 unit/gram topical cream)  APPLY TO THE AFFECTED AREA(S) THREE TIMES DAILY  Qty:  30 gm        Days Supply:  10        Refills:  2          Substitutions Allowed     Route To Pharmacy - [facility]   Note from Pharmacy:  This prescription was filled on 10/27/2020. Any refills authorized will be placed on file.              ------------------------------------------  From: [facility]  To: Negrito Abdul MD  Sent: October 27, 2020 10:15:20 AM CDT  Subject: Medication Management  Due: October 8, 2020 2:04:31 PM CDT     ** On Hold Pending Signature **     Drug: nystatin topical (nystatin 100,000 units/g topical cream), 1 jonathan Topical tid  Quantity: 30 gm  Days Supply: 0  Refills: 1  Substitutions Allowed  Notes from Pharmacy:     Dispensed Drug: nystatin topical (nystatin 100,000 units/g topical cream), APPLY TO THE AFFECTED AREA(S) THREE TIMES DAILY  Quantity: 30 gm  Days Supply: 10  Refills: 2  Substitutions Allowed  Notes from Pharmacy: This prescription was filled on 10/27/2020. Any refills authorized will be placed on file.  ------------------------------------------

## 2022-02-15 NOTE — TELEPHONE ENCOUNTER
---------------------  From: Elin Webster CMA   To: Care Coordinators Pool ([facility]);     Sent: 2/26/2019 4:07:58 PM CST  Subject: phone note- Med delivery ?'s     Phone Message    PCP:    AFSHAN      Time of Call:  3:53pm       Person Calling:  Pat    Phone number:  [phone]    Returned call at: 3:55pm    Note:  Patient called asking for help to get medications mailed to her from [facility] in . She is currently out of metformin. I called [facility] and they do not deliver/mail meds. Patient needs to call her insurance to see if that is something that can be set up. Called and notified patient. Also gave her phone number for [facility]. to see if they could help her. Can you follow up with Pat and see if you can help her. Thank you.    Last office visit and reason:  1/15/19 dizzinessSpoke with Pat, she is all set up with [facility].  She has no questions or concerns at this time.   Yvonne Phan CMA.

## 2022-02-15 NOTE — PROGRESS NOTES
Patient:   PAKO BARBER            MRN: [id number]            FIN: [id number]               Age:   77 years     Sex:  Female     :  1940   Associated Diagnoses:   Visit for suture removal   Author:   Jan York PA-C      Report Summary   Diagnosis  Visit for suture removal (HAG06-ZL Z48.02).     Chief Complaint   3/12/2018 9:58 AM CDT    Suture removal- by eye        Procedure   Staple/ Suture removal procedure   Date/ Time:  3/12/2018 10:10:00 AM.     Confirmed: patient, procedure, site, safety procedures followed.     Performed by: Jan York PA-C.     Referred by: Richard Conde MD.     Preparation and technique: dressing removal, wound cleaning.     Wound assessment:: the right side of the face, the nose, characteristics clean, incisional margin smooth, course progressing as expected.     Insertion/ Removal: Date sutured  3/5/2018.     Dressing applied: adhesive strip.     Procedure tolerated: well.     Complications: none.     Path discussed..        Impression and Plan   Diagnosis     Visit for suture removal ([id number] Z48.02).     Counseled:  Patient, Regarding treatment.

## 2022-02-15 NOTE — PROGRESS NOTES
"   Patient:   PAKO BARBER            MRN: [id number]            FIN: [id number]               Age:   77 years     Sex:  Female     :  1940   Associated Diagnoses:   Dependent edema; Rash   Author:   Jose Gordon MD      Chief Complaint   2017 10:17 AM CDT   pt here for med check, also would like to discuss a rash that she has below her breast and on both sides of the groin area, has been rubbing deodarant on the area, has Ketokonazole cream and wonders if she can use this, also has redness on lower legs,        History of Present Illness   see chief complaint as noted above and confirmed with the patient     77 year old female here today for a general follow up. Overall she is doing ok yet does have some concerns today.    Rash: She has a rash present below the breast and on both sides of the groin. She has been washing the areas daily and has been using deodarant to help with the sweating. She was given a cream from her friend and she has not used it as she was unsure if it would be ok to use, it is \"Ketoconazole\" cream.     Edema: Has bilateral lower leg edema, right leg is worse, today there is redness present and she has long socks on, she has compression socks that she wears, she say's she will go home and put them on, she also elevates her legs. No wheeping present today.          Review of Systems   Constitutional:  No fever.    Integumentary:  Rash.             Health Status   Allergies:    Allergic Reactions (Selected)  Severity Not Documented  Latex (No reactions were documented)  Opthmolic eye drops (Swelling of eye..)  Vicodin (Behavioral disturbances)  Zocor (Diarrhea)   Medications:  (Selected)   Prescriptions  Prescribed  Ditropan XL 10 mg/24 hr oral tablet, extended release: 1 tab(s) ( 10 mg ), PO, Daily, # 90 tab(s), 1 Refill(s), Type: Maintenance, Pharmacy: [facility] #[id number], 1 tab(s) po daily  Lasix 20 mg oral tablet: 1 tab(s) ( 20 mg ), PO, Daily, # 90 tab(s), 0 Refill(s), " Type: Maintenance, Pharmacy: [facility] #[id number], 1 tab(s) po daily  MOLDABLE ORTHOTICS MG APPLY: See Instructions, Instructions: USE DAILY WITH DIABETIC SHOES E11.9, # 6 unknown unit, Type: Soft Stop, Pharmacy: [facility], USE DAILY WITH DIABETIC SHOES E11.9  atorvastatin 80 mg oral tablet: 1 tab(s) ( 80 mg ), po, daily, # 30 tab(s), 0 Refill(s), Type: Maintenance, Pharmacy: [facility] #[id number], Patient is due for HTN follow up  custom fit diabetic shoes: custom fit diabetic shoes, See Instructions, Instructions: wear daily for heel pain, neuropathy, venous insufficiency, Supply, # 2 EA, 0 Refill(s), Type: Maintenance  Documented Medications  Documented  Daily Multiple Vitamins: PO, Daily, 0  Tylenol: PO, PRN: as needed for pain, 0 Refill(s), Type: Maintenance  Vitamin D3 1000 intl units oral capsule: 1 cap(s) ( 1,000 International Unit ), PO, daily, 0 Refill(s), Type: Soft Stop  aspirin 81 mg oral tablet: 1 tab(s) ( 81 mg ), PO, Daily, # 30 tab(s), 0 Refill(s), Type: Maintenance   Problem list:    All Problems  Varicose Veins of Legs / ICD-9-.9 / Confirmed  Polio / ICD-9-.90 / Confirmed  Osteoporosis / ICD-9-.00 / Confirmed  Obesity / ICD-9-.00 / Probable  OA (Osteoarthritis) / ICD-9-.90 / Confirmed  Mixed hyperlipidemia / SNOMED CT 096991055 / Confirmed  Metabolic Syndrome / ICD-9-.7 / Confirmed  EDEMA / ICD-9-.3 / Confirmed  Diabetes Mellitus / ICD-9-.00 / Confirmed  CVA, old, cognitive deficits / ICD-9-.0 / Confirmed  Chronic Venous Insufficiency / ICD-9-.81 / Confirmed  Buttock Pain / ICD-9-.1 / Confirmed  Hyperplastic Colonic Polyp / SNOMED CT 030248097 / Confirmed  Inactive: Tear of Medial Meniscus of Knee / ICD-9-.0  Inactive: Ischial Bursitis / ICD-9-.5  Resolved: Tobacco user / ICD-9-.1  Resolved: Stroke / ICD-9-  Resolved: Fracture of Wrist / ICD-9-.00  Canceled: Dyslipidemia / ICD-9-.4       Histories   Past Medical History:    Active  Diabetes Mellitus (250.00): Onset on 5/1/2007 at 66 years.  Comments:  5/8/2013 CDT 11:12 AM Michelle Preston  Type 2  Hyperplastic Colonic Polyp (140762669): Onset in 2004 at 64 years.  Buttock Pain (729.1): Onset in 2004 at 63 years.  Comments:  5/8/2013 CDT 11:24 AM Michelle Preston  Work-related fall.  Chronic left buttock pain since then.  OA (Osteoarthritis) (715.90)  Osteoporosis (733.00)  EDEMA (782.3)  Metabolic Syndrome (277.7)  Polio (045.90)  Chronic Venous Insufficiency (459.81)  Varicose Veins of Legs (454.9)  Resolved  Fracture of Wrist (814.00): Onset in 2004 at 63 years.  Resolved.  Comments:  5/8/2013 CDT 11:22 AM Michelle Preston  Left    5/8/2013 CDT 11:25 AM Michelle Preston  Work-related fall.  Stroke (436): Onset on 7/14/2003 at 63 years.  Resolved.  Comments:  5/8/2013 CDT 12:03 PM Michelle Preston  Acute CVA with right-sided weakness.  Tobacco user (305.1):  Resolved.  Comments:  5/8/2013 CDT 11:46 AM Michelle Preston  Patient states that she quit in 1989.   Family History:    Heart disease  Mother  Grandmother (M)  Brother  Bladder cancer..  Mother     Procedure history:    Extracapsular cataract extraction and insertion of intraocular lens (366968367) on 9/15/2016 at 76 Years.  Comments:  10/10/2016 1:01 PM - Rachel Ivan  Right.  Extracapsular cataract extraction and insertion of intraocular lens (888559229) on 9/1/2016 at 76 Years.  Comments:  9/8/2016 2:37 PM - Rachel Ivan  Left.  Colonoscopy (815905802) on 6/25/2014 at 74 Years.  Comments:  6/25/2014 9:16 AM - Demond Knight MD  Moderate left diberticulosis. No polyps. Repeat 10 years.  Sclerotherapy of vein of lower limb (7937764556) in 2010 at 70 Years.  Comments:  5/8/2013 11:40 Michelle Kauffman  Left leg in 03/2010.  Right leg in 04/2010.  [facility].  Laser ablation of long saphenous vein (6565058689) on 8/3/2009 at 69 Years.  Comments:  5/8/2013 11:45 CHHAYA Zhao  Michelle  Right great and right accessory saphenous veins.  Fluoroscopically-guided needle placement for injection of ischial bursa on the left. on 10/10/2008 at 68 Years.  Comments:  2013 11:49 AM - Michelle Zhao  [facility].  Bone density scan (554943108) on 2007 at 67 Years.  Comments:  2013 11:58 AM - Michelle Zhao  Osteopenia  Fracture of Wrist (814.00) in  at 64 Years.  Comments:  2013 11:54 AM - Michelle Zhao  Left  Colonoscopy (664182110) on 2004 at 63 Years.  Comments:  2011 7:35 AM - Rachel Ivan  Repeat in 10 yrs.    10/4/2010 11:58 AM - Demond Knight MD  diverticulosis. single hyperplastic rectal polyp  Bone density scan (912281826) in  at 62 Years.  Arthroscopy of knee (468742831) in  at 60 Years.  Comments:  2013 12:08 PM - Michelle Zhao  Left knee.  Flexible sigmoidoscopy (04943894) on 1999 at 59 Years.  Arthroscopy of Knee (80.26) in  at 58 Years.   section (81867495).  Comments:  2013 11:52 AM - Michelle Zhao  1960's    2010 12:42 PM - Mila Brush   3, Para 3   Social History:        Alcohol Assessment: Denies Alcohol Use            Never      Tobacco Assessment: Past            Past, Cigarettes, 15 per day.  Started age 19 Years.  Stopped age 44 Years.      Substance Abuse Assessment: Denies Substance Abuse      Employment and Education Assessment            Retired      Home and Environment Assessment            Marital status: .  3 children.      Other Assessment            Marital status,         Physical Examination   Vital Signs   2017 10:17 AM CDT Peripheral Pulse Rate 74 bpm    Pulse Site Radial artery    Systolic Blood Pressure 114 mmHg    Diastolic Blood Pressure 80 mmHg    Mean Arterial Pressure 91 mmHg    BP Site Right arm    Oxygen Saturation 98 %      Measurements from flowsheet : Measurements   2017 10:17 AM CDT Height Measured - Standard 63 in    Weight Measured - Standard 203.4 lb     BSA 2.02 m2    Body Mass Index 36.03 kg/m2      General:  Alert and oriented, No acute distress.    Eye:  Pupils are equal, round and reactive to light, Normal conjunctiva.    HENT:  Oral mucosa is moist.    Neck:  Supple.    Respiratory:  Respirations are non-labored.    Cardiovascular:  Normal rate, Regular rhythm, No edema.    Gastrointestinal:  Non-distended.    Musculoskeletal:  Normal gait.    Integumentary:  Warm, No rash.    Psychiatric:  Cooperative, Appropriate mood & affect, Normal judgment.       Review / Management   Results review      Impression and Plan       Diagnosis     Dependent edema (DUG29-KC R60.9).     Rash ([id number] R21).     Course:  Rashes consistent with the skin on skin dermatitis. Dependent edema is long-standing and stable    .    Plan:  It is okay to use the Ketoconazole cream, will send in Triamcinolone cream, this cream should be applied first, make sure it touches the skin. Then can put Ketoconazole cream on the rash after the Triamcinolone.     Will refill medications. Continue current medications, no changes.      Continue to wear the compression socks.     Due in November for Diabetic Check.     Flu vaccine given today .    Mary Jo PARSONS Medical Assistant acted solely as a scribe for, and in presence of Dr. Jose Gordon who performed the services.

## 2022-02-15 NOTE — TELEPHONE ENCOUNTER
Entered by Negrito Abdul MD on June 06, 2021 3:21:54 PM CDT  ---------------------  From: Negrito Abdul MD   To: [email or web address] [facility]    Sent: 6/6/2021 3:21:54 PM CDT  Subject: Medication Management     ** Submitted: **  Complete:nystatin topical (nystatin 100,000 units/g topical cream)   Signed by Negrito Abdul MD  6/6/2021 8:21:00 PM [facility]    ** Approved with modifications: **  nystatin topical (nystatin 100,000 unit/gram topical cream)  APPLY TO THE AFFECTED AREA(S) THREE TIMES DAILY  Qty:  30 gm        Days Supply:  30        Refills:  2          Substitutions Allowed     Route To Pharmacy - [facility]               ------------------------------------------  From: [facility]  To: Negrito Abdul MD  Sent: June 5, 2021 9:10:07 AM CDT  Subject: Medication Management  Due: June 4, 2021 8:25:53 PM CDT     ** On Hold Pending Signature **     Dispensed Drug: nystatin topical (nystatin 100,000 units/g topical cream), APPLY TO THE AFFECTED AREA(S) THREE TIMES DAILY  Quantity: 30 gm  Days Supply: 30  Refills: 2  Substitutions Allowed  Notes from Pharmacy:  ------------------------------------------

## 2022-02-15 NOTE — NURSING NOTE
Pt called wondering if she should make an appt because she needs refills on Lasix, has possible yeast infection of skin in groin area and under breasts, and other concerns. I advised the pt to make an appt to discuss concerns and also have med check as she is due for a visit before more atorvastatin can be filled as well. Pt understood and agreed so was transferred to appt line.

## 2022-02-15 NOTE — PROGRESS NOTES
Patient:   PAKO BARBER            MRN: [id number]            FIN: [id number]               Age:   78 years     Sex:  Female     :  1940   Associated Diagnoses:   Right sided weakness; Headache; History of stroke; Diabetes mellitus with diabetic neuropathy; Bruising; Peripheral edema   Author:   Richard Conde MD      Chief Complaint   6/15/2018 10:57 AM CDT   f/u elevated A1c; Pt c/o feeling sluggish, balance off and fatigue; legs swelling and tight skin      History of Present Illness   patient here with concerns about balance, feels like her right side, particularly her right leg, is weaker than usual, has been progressively worsening for several months  feels fatigued, sluggished  notes history of stroke  has right sided mild weakness for years, not sure if that has worsening some in the past months  friends and family have been commenting on increased gait issues    patient also notes that peripheral edema continues to be bothersome for patient  has been taking 20mg furosemide, noticed slight increase in urination but has ongoing swelling  legs feel tight, no pain    diabetes follow up, A1c is elevated  had been diet controlled  does not check blood sugars regulary    has also noticed increased bruising with minimal activity         Health Status   Allergies:    Allergic Reactions (All)  Severity Not Documented  Latex (No reactions were documented)  Opthmolic eye drops (Swelling of eye..)  Vicodin (Behavioral disturbances)  Zocor (Diarrhea)   Medications:  (Selected)   Prescriptions  Prescribed  Ditropan XL 10 mg/24 hr oral tablet, extended release: 1 tab(s) ( 10 mg ), PO, Daily, # 90 tab(s), 0 Refill(s), Type: Maintenance, Pharmacy: [facility] #[id number], 1 tab(s) po daily  Lasix 20 mg oral tablet: 1 tab(s) ( 20 mg ), PO, Daily, # 90 tab(s), 0 Refill(s), Type: Maintenance, Pharmacy: [facility] #[id number], 1 tab(s) po daily  MOLDABLE ORTHOTICS MG APPLY: See Instructions, Instructions: USE DAILY WITH  DIABETIC SHOES E11.9, # 6 unknown unit, Type: Soft Stop, Pharmacy: [facility], USE DAILY WITH DIABETIC SHOES E11.9  atorvastatin 80 mg oral tablet: 1 tab(s) ( 80 mg ), po, daily, # 90 tab(s), 0 Refill(s), Type: Maintenance, Pharmacy: [facility] #[id number], 1 tab(s) po daily  custom fit diabetic shoes: custom fit diabetic shoes, See Instructions, Instructions: wear daily for heel pain, neuropathy, venous insufficiency, Supply, # 2 EA, 0 Refill(s), Type: Maintenance  triamcinolone 0.1% topical ointment: 1 jonathan, top, tid, Instructions: apply a thin film  to affected area  Area needs to be dry, # 60 gm, 1 Refill(s), Type: Maintenance, Pharmacy: [facility] #[id number], 1 jonathan top tid,Instr:apply a thin film; to affected area; Area needs to be dry  Documented Medications  Documented  Daily Multiple Vitamins: PO, Daily, 0  Tylenol: PO, PRN: as needed for pain, 0 Refill(s), Type: Maintenance  Vitamin D3 1000 intl units oral capsule: 1 cap(s) ( 1,000 International Unit ), PO, daily, 0 Refill(s), Type: Soft Stop  aspirin 81 mg oral tablet: 1 tab(s) ( 81 mg ), PO, Daily, # 30 tab(s), 0 Refill(s), Type: Maintenance,    Medications          *denotes recorded medication          MOLDABLE ORTHOTICS MG APPLY: See Instructions, USE DAILY WITH DIABETIC SHOES E11.9, 6 unknown unit.          custom fit diabetic shoes: See Instructions, wear daily for heel pain, neuropathy, venous insufficiency, 2 EA, 0 Refill(s).          *Tylenol: PO, PRN: as needed for pain.          *aspirin 81 mg oral tablet: 81 mg, 1 tab(s), PO, Daily, 30 tab(s).          atorvastatin 80 mg oral tablet: 80 mg, 1 tab(s), po, daily, 90 tab(s), 0 Refill(s).          *Vitamin D3 1000 intl units oral capsule: 1,000 International Unit, 1 cap(s), PO, daily.          Lasix 20 mg oral tablet: 20 mg, 1 tab(s), PO, Daily, 90 tab(s), 0 Refill(s).          *Daily Multiple Vitamins: PO, Daily.          Ditropan XL 10 mg/24 hr oral tablet, extended release: 10 mg, 1 tab(s), PO,  Daily, 90 tab(s), 0 Refill(s).          triamcinolone 0.1% topical ointment: 1 jonathan, top, tid, apply a thin film  to affected area  Area needs to be dry, 60 gm, 1 Refill(s).     Problem list:    All Problems (Selected)  Hyperplastic Colonic Polyp / SNOMED CT 596413477 / Confirmed  Buttock Pain / ICD-9-.1 / Confirmed  Chronic Venous Insufficiency / ICD-9-.81 / Confirmed  CVA, old, cognitive deficits / ICD-9-.0 / Confirmed  Diabetes mellitus with diabetic neuropathy / SNOMED CT 680076521 / Confirmed  EDEMA / ICD-9-.3 / Confirmed  Metabolic Syndrome / ICD-9-.7 / Confirmed  Mixed hyperlipidemia / SNOMED CT 325742241 / Confirmed  OA (Osteoarthritis) / ICD-9-.90 / Confirmed  Obesity / ICD-9-.00 / Probable  Osteoporosis / ICD-9-.00 / Confirmed  Polio / ICD-9-.90 / Confirmed  Varicose Veins of Legs / ICD-9-.9 / Confirmed      Histories   Past Medical History:    Active  Hyperplastic Colonic Polyp (SNOMED CT 782107170): Onset in 2004 at 64 years.  Buttock Pain (ICD-9-.1): Onset in 2004 at 63 years.  Comments:  5/8/2013 CDT 11:24 AM Michelle Preston  Work-related fall.  Chronic left buttock pain since then.  OA (Osteoarthritis) (ICD-9-.90)  Osteoporosis (ICD-9-.00)  EDEMA (ICD-9-.3)  Metabolic Syndrome (ICD-9-.7)  Polio (ICD-9-.90)  Chronic Venous Insufficiency (ICD-9-.81)  Varicose Veins of Legs (ICD-9-.9)  Resolved  Fracture of Wrist (ICD-9-.00): Onset in 2004 at 63 years.  Resolved.  Comments:  5/8/2013 CDT 11:22 AM Michelle Preston  Left    5/8/2013 CDT 11:25 AM Michelle Preston  Work-related fall.  Stroke (ICD-9-): Onset on 7/14/2003 at 63 years.  Resolved.  Comments:  5/8/2013 CDT 12:03 PM BOBT Michelle Hall  Acute CVA with right-sided weakness.  Tobacco user (ICD-9-.1):  Resolved.  Comments:  5/8/2013 CDT 11:46 AM Michelle Preston  Patient states that she quit in 1989.   Family History:     Heart disease  Mother  Grandmother (M)  Brother  Bladder cancer..  Mother     Procedure history:    Extracapsular cataract extraction and insertion of intraocular lens (101755910) on 9/15/2016 at 76 Years.  Comments:  10/10/2016 1:01 PM - Rachel Ivan  Right.  Extracapsular cataract extraction and insertion of intraocular lens ([id number]) on 2016 at 76 Years.  Comments:  2016 2:37 PM - Rachel Ivan  Left.  Colonoscopy ([id number]) on 2014 at 74 Years.  Comments:  2014 9:16 CHHAYA - Demond Knight MD  Moderate left diberticulosis. No polyps. Repeat 10 years.  Sclerotherapy of vein of lower limb (7436221574) in  at 70 Years.  Comments:  2013 11:40 Michelle Kauffman  Left leg in 2010.  Right leg in 2010.  [facility].  Laser ablation of long saphenous vein ([id number]) on 8/3/2009 at 69 Years.  Comments:  2013 11:45 Michelle Kauffman  Right great and right accessory saphenous veins.  Fluoroscopically-guided needle placement for injection of ischial bursa on the left. on 10/10/2008 at 68 Years.  Comments:  2013 11:49 Michelle Kauffman  [facility].  Bone density scan ([id number]) on 2007 at 67 Years.  Comments:  2013 11:58 Michelle Kauffman  Osteopenia  Fracture of Wrist (814.00) in  at 64 Years.  Comments:  2013 11:54 Michelle Kauffman  Left  Colonoscopy ([id number]) on 2004 at 63 Years.  Comments:  2011 7:35 AM Rachel Bailon  Repeat in 10 yrs.    10/4/2010 11:58 Demond Sykes MD  diverticulosis. single hyperplastic rectal polyp  Bone density scan (980844256) in  at 62 Years.  Arthroscopy of knee (679527615) in  at 60 Years.  Comments:  2013 12:08 PM - Michelle Zhao  Left knee.  Flexible sigmoidoscopy (01015708) on 1999 at 59 Years.  Arthroscopy of Knee (80.26) in 1998 at 58 Years.   section ([id number]).  Comments:  2013 11:52 AM - Michelle Zhao's    2010 12:42 PM - Mila Brush   3, Para 3    Social History:        Alcohol Assessment: Denies Alcohol Use            Never      Tobacco Assessment: Past            Past, Cigarettes, 15 per day.  Started age 19 Years.  Stopped age 44 Years.      Substance Abuse Assessment: Denies Substance Abuse      Employment and Education Assessment            Retired      Home and Environment Assessment            Marital status: .  3 children.      Other Assessment            Marital status,         Physical Examination   Vital Signs   6/15/2018 10:57 AM CDT Peripheral Pulse Rate 100 bpm    Pulse Site Radial artery    HR Method Manual    Systolic Blood Pressure 120 mmHg    Diastolic Blood Pressure 60 mmHg    Mean Arterial Pressure 80 mmHg    BP Site Right arm    BP Method Manual      Measurements from flowsheet : Measurements   6/15/2018 10:57 AM CDT Height Measured - Standard 64 in    Weight Measured - Standard 200.2 lb    BSA 2.02 m2    Body Mass Index 34.36 kg/m2  HI      General:  Alert and oriented, No acute distress.    Eye:  Pupils are equal, round and reactive to light, Normal conjunctiva.    HENT:  Normocephalic, Oral mucosa is moist, No pharyngeal erythema.    Neck:  Supple, Non-tender, No lymphadenopathy.    Respiratory:  Lungs are clear to auscultation.    Cardiovascular:  Normal rate, Regular rhythm.    3+ pitting edema in legs from knees down, no calf pain  reports decreased sensation in right side   strength 4+/5 on right, normal on left      Impression and Plan   Diagnosis     Right sided weakness (OWY30-MJ R53.1).     Headache (IJT61-NY R51).     History of stroke (UEE47-IP Z86.73).     Plan:  given persistent symptoms and concerns about gait, will get patient scheduled for MRI to evaluate for recurrent stroke.    Diagnosis     Diabetes mellitus with diabetic neuropathy (SNH56-IK E11.40).     Orders     Orders (Selected)   Outpatient Orders  Ordered  Return to Clinic (Request): RFV: lab visit in 3 months: nonfasting - A1c, chem 8, Return in  3 months  Prescriptions  Prescribed  metFORMIN 500 mg oral tablet: 1 tab(s) ( 500 mg ), PO, BID, # 60 tab(s), 5 Refill(s), Type: Maintenance, Pharmacy: [facility] #[id number], 1 tab(s) po bid.     Diagnosis     Bruising (UEK25-CF T14.8XXA).     Orders     hx of low platelets, will check cbc.     Diagnosis     Peripheral edema (MKC99-QL R60.9).     Course:  will increase furosemide, add potassium. Chem 8 today and in 3 months with increase in furosemide and addition of metformin.    Orders     Orders (Selected)   Prescriptions  Prescribed  Lasix 40 mg oral tablet: 1 tab(s) ( 40 mg ), PO, Daily, # 90 tab(s), 1 Refill(s), Type: Maintenance, Pharmacy: [facility] #[id number], 1 tab(s) po daily  potassium chloride 20 mEq oral tablet, extended release: 1 tab(s) ( 20 mEq ), PO, daily, # 90 tab(s), 3 Refill(s), Type: Maintenance, Pharmacy: [facility] #[id number], 1 tab(s) po daily.

## 2022-02-15 NOTE — NURSING NOTE
Comprehensive Intake Entered On:  1/15/2019 8:46 AM CST    Performed On:  1/15/2019 8:38 AM CST by Eliana Sbiley MA               Summary   Chief Complaint :   Speach is off, hard to get out of bed, dizzy, thinking to speaking is difficult   Advance Directive :   Yes   Weight Measured :   193.6 lb(Converted to: 193 lb 10 oz, 87.82 kg)    Height Measured :   64 in(Converted to: 5 ft 4 in, 162.56 cm)    Body Mass Index :   33.23 kg/m2 (HI)    Body Surface Area :   1.99 m2   Systolic Blood Pressure :   124 mmHg   Diastolic Blood Pressure :   68 mmHg   Mean Arterial Pressure :   87 mmHg   Peripheral Pulse Rate :   72 bpm   BP Site :   Right arm   Pulse Site :   Radial artery   BP Method :   Manual   HR Method :   Manual   Temperature Tympanic :   97.6 DegF(Converted to: 36.4 DegC)  (LOW)    Eliana Sibley MA - 1/15/2019 8:38 AM CST   Health Status   Allergies Verified? :   Yes   Medication History Verified? :   Yes   Immunizations Current :   Yes   Medical History Verified? :   Yes   Pre-Visit Planning Status :   Completed   Tobacco Use? :   Former smoker   Eliana Sibley MA - 1/15/2019 8:38 AM CST   Consents   Consent for Immunization Exchange :   Consent Granted   Consent for Immunizations to Providers :   Consent Granted   Eliana Sibley MA - 1/15/2019 8:38 AM CST   Meds / Allergies   (As Of: 1/15/2019 8:46:01 AM CST)   Allergies (Active)   Latex  Estimated Onset Date:   Unspecified ; Created By:   Ramila Mayes CMA; Reaction Status:   Active ; Category:   Environment ; Substance:   Latex ; Type:   Allergy ; Updated By:   Ramila Mayes CMA; Reviewed Date:   1/15/2019 8:41 AM CST      opthmolic eye drops  Estimated Onset Date:   Unspecified ; Reactions:   Swelling of eye.. ; Comments:     Comment 1: Gentamycin opthalmic   ; Created By:   Michelle Zhao; Reaction Status:   Active ; Category:   Drug ; Substance:   opthmolic eye drops ; Type:   Allergy ; Updated By:   Michelle Zhao; Source:   Paper Chart ; Reviewed Date:    1/15/2019 8:41 AM CST      Vicodin  Estimated Onset Date:   Unspecified ; Reactions:   behavioral disturbances ; Created By:   Elyse Anne RN; Reaction Status:   Active ; Category:   Drug ; Substance:   Vicodin ; Type:   Allergy ; Updated By:   Elyse Anne RN; Reviewed Date:   1/15/2019 8:41 AM CST      Zocor  Estimated Onset Date:   Unspecified ; Reactions:   Diarrhea ; Created By:   Michelle Zhao; Reaction Status:   Active ; Category:   Drug ; Substance:   Zocor ; Type:   Allergy ; Updated By:   Michelle Zhao; Reviewed Date:   1/15/2019 8:41 AM CST        Medication List   (As Of: 1/15/2019 8:46:01 AM CST)   Prescription/Discharge Order    Miscellaneous Rx Supply  :   Miscellaneous Rx Supply ; Status:   Prescribed ; Ordered As Mnemonic:   custom fit diabetic shoes ; Simple Display Line:   See Instructions, wear daily for heel pain, neuropathy, venous insufficiency, 2 EA, 0 Refill(s) ; Ordering Provider:   Richard Conde MD; Catalog Code:   Miscellaneous Rx Supply ; Order Dt/Tm:   9/18/2018 1:29:36 PM          Miscellaneous Prescription  :   Miscellaneous Prescription ; Status:   Prescribed ; Ordered As Mnemonic:   moldable orthotics ; Simple Display Line:   See Instructions, use daily with diabetic shoes, 6 EA, 0 Refill(s) ; Ordering Provider:   Richard Conde MD; Catalog Code:   Miscellaneous Prescription ; Order Dt/Tm:   9/18/2018 1:29:55 PM          metFORMIN  :   metFORMIN ; Status:   Prescribed ; Ordered As Mnemonic:   metFORMIN 500 mg oral tablet ; Simple Display Line:   500 mg, 1 tab(s), PO, BID, 180 tab(s), 3 Refill(s) ; Ordering Provider:   Richard Conde MD; Catalog Code:   metFORMIN ; Order Dt/Tm:   9/18/2018 1:25:08 PM          potassium chloride  :   potassium chloride ; Status:   Prescribed ; Ordered As Mnemonic:   potassium chloride 20 mEq oral tablet, extended release ; Simple Display Line:   20 mEq, 1 tab(s), PO, daily, 90 tab(s), 3 Refill(s) ; Ordering Provider:   Richard Conde MD;  Catalog Code:   potassium chloride ; Order Dt/Tm:   9/18/2018 1:25:07 PM          furosemide  :   furosemide ; Status:   Prescribed ; Ordered As Mnemonic:   Lasix 40 mg oral tablet ; Simple Display Line:   40 mg, 1 tab(s), PO, Daily, 90 tab(s), 3 Refill(s) ; Ordering Provider:   Richard Conde MD; Catalog Code:   furosemide ; Order Dt/Tm:   9/18/2018 1:25:05 PM          atorvastatin  :   atorvastatin ; Status:   Prescribed ; Ordered As Mnemonic:   atorvastatin 80 mg oral tablet ; Simple Display Line:   80 mg, 1 tab(s), po, daily, 90 tab(s), 3 Refill(s) ; Ordering Provider:   Richard Conde MD; Catalog Code:   atorvastatin ; Order Dt/Tm:   9/18/2018 1:25:04 PM          oxybutynin  :   oxybutynin ; Status:   Prescribed ; Ordered As Mnemonic:   Ditropan XL 10 mg/24 hr oral tablet, extended release ; Simple Display Line:   10 mg, 1 tab(s), PO, Daily, 90 tab(s), 3 Refill(s) ; Ordering Provider:   Richard Conde MD; Catalog Code:   oxybutynin ; Order Dt/Tm:   9/18/2018 1:25:02 PM          triamcinolone topical  :   triamcinolone topical ; Status:   Prescribed ; Ordered As Mnemonic:   triamcinolone 0.1% topical ointment ; Simple Display Line:   1 jonathan, top, tid, apply a thin film  to affected area  Area needs to be dry, 60 gm, 1 Refill(s) ; Ordering Provider:   Richard Conde MD; Catalog Code:   triamcinolone topical ; Order Dt/Tm:   12/11/2017 9:03:44 AM            Home Meds    cholecalciferol  :   cholecalciferol ; Status:   Documented ; Ordered As Mnemonic:   Vitamin D3 1000 intl units oral capsule ; Simple Display Line:   1,000 International Unit, 1 cap(s), PO, daily ; Catalog Code:   cholecalciferol ; Order Dt/Tm:   4/16/2012 11:05:56 AM          aspirin  :   aspirin ; Status:   Documented ; Ordered As Mnemonic:   aspirin 81 mg oral tablet ; Simple Display Line:   81 mg, 1 tab(s), PO, Daily, 30 tab(s) ; Catalog Code:   aspirin ; Order Dt/Tm:   3/22/2012 10:42:39 AM          acetaminophen  :   acetaminophen ;  Status:   Documented ; Ordered As Mnemonic:   Tylenol ; Simple Display Line:   PO, PRN: as needed for pain ; Catalog Code:   acetaminophen ; Order Dt/Tm:   1/15/2010 1:47:50 PM          multivitamin  :   multivitamin ; Status:   Documented ; Ordered As Mnemonic:   Daily Multiple Vitamins ; Simple Display Line:   PO, Daily ; Catalog Code:   multivitamin ; Order Dt/Tm:   1/15/2010 1:47:00 PM

## 2022-02-15 NOTE — NURSING NOTE
Depression Screening Entered On:  1/20/2020 10:30 AM CST    Performed On:  1/20/2020 10:30 AM CST by Kusum Alexander CMA               Depression Screening   Little Interest - Pleasure in Activities :   Not at all   Feeling Down, Depressed, Hopeless :   Not at all   Initial Depression Screen Score :   0    Trouble Falling or Staying Asleep :   More than half the days   Feeling Tired or Little Energy :   More than half the days   Poor Appetite or Overeating :   Not at all   Feeling Bad About Yourself :   Not at all   Trouble Concentrating :   Several days   Moving or Speaking Slowly :   Several days   Thoughts Better Off Dead or Hurting Self :   Not at all   Detailed Depression Screen Score :   6    Total Depression Screen Score :   6    Kusum Alexander CMA - 1/20/2020 10:30 AM CST

## 2022-02-15 NOTE — NURSING NOTE
Comprehensive Intake Entered On:  10/28/2020 12:16 PM CDT    Performed On:  10/28/2020 12:13 PM CDT by Hannah Hua               Summary   Advance Directive :   Yes   Menstrual Status :   Postmenopausal   Height Measured :   64 in(Converted to: 5 ft 4 in, 162.56 cm)    Systolic Blood Pressure :   134 mmHg (HI)    Diastolic Blood Pressure :   78 mmHg   Mean Arterial Pressure :   97 mmHg   Peripheral Pulse Rate :   72 bpm   Temperature Tympanic :   97.9 DegF(Converted to: 36.6 DegC)    Oxygen Saturation :   96 %   Hannah Hua - 10/28/2020 12:13 PM CDT   Health Status   Immunizations Current :   Yes   Tobacco Use? :   Former smoker   Hannah Hua - 10/28/2020 12:13 PM CDT   ID Risk Screen   Recent Travel History :   No recent travel   Family Member Travel History :   No recent travel   Other Exposure to Infectious Disease :   Unknown   Hannah Hua - 10/28/2020 12:13 PM CDT

## 2022-02-15 NOTE — NURSING NOTE
Comprehensive Intake Entered On:  2019 1:06 PM CDT    Performed On:  2019 1:00 PM CDT by Kusum Alexander CMA               Summary   Chief Complaint :   cough; fever; someone to wrap legs? daughter would like a home nurse    Advance Directive :   Yes   Weight Measured :   194.0 lb(Converted to: 194 lb 0 oz, 88.00 kg)    Height Measured :   64 in(Converted to: 5 ft 4 in, 162.56 cm)    Body Mass Index :   33.3 kg/m2 (HI)    Body Surface Area :   1.99 m2   Systolic Blood Pressure :   118 mmHg   Diastolic Blood Pressure :   58 mmHg (LOW)    Mean Arterial Pressure :   78 mmHg   Peripheral Pulse Rate :   64 bpm   BP Method :   Manual   HR Method :   Manual   Temperature Tympanic :   98.8 DegF(Converted to: 37.1 DegC)    Kusum Alexander CMA - 2019 1:00 PM CDT   Health Status   Allergies Verified? :   Yes   Medication History Verified? :   Yes   Immunizations Current :   Yes   Medical History Verified? :   Yes   Pre-Visit Planning Status :   Completed   Tobacco Use? :   Former smoker   Kusum Alexander CMA - 2019 1:00 PM CDT   Demographics   Last Name :   Greer   Address :   [address] [place] [address] Phone Number :   [phone]   First Name :   Arianne Barry Initial :   L   Responsible Party Date of Birth () :   1940 CDT   City :   [place]   State :   [place]   Zip Code :   [address]   Kusum Alexander CMA - 2019 1:00 PM CDT   Providers Grid   Provider Name :    Dr. Gio Acharya        Provider Specialty :    Dentist   Dental surgeon   Eye care        Comments :    Suzie Larsen          Kusum Alexander CMA - 2019 1:00 PM CDT  Kusum Alexander CMA - 2019 1:00 PM CDT  Kusum Alexander CMA - 2019 1:00 PM CDT       Consents   Consent for Immunization Exchange :   Consent Granted   Consent for Immunizations to Providers :   Consent Granted   Kusum Alexander CMA - 2019 1:00 PM CDT   Meds / Allergies   (As Of: 2019 1:06:21 PM CDT)   Allergies (Active)   Latex  Estimated Onset  Date:   Unspecified ; Created By:   Ramila Mayes CMA; Reaction Status:   Active ; Category:   Environment ; Substance:   Latex ; Type:   Allergy ; Updated By:   Ramila Mayes CMA; Reviewed Date:   4/4/2019 1:00 PM CDT      opthmolic eye drops  Estimated Onset Date:   Unspecified ; Reactions:   Swelling of eye.. ; Comments:     Comment 1: Gentamycin opthalmic   ; Created By:   Michelle Zhao; Reaction Status:   Active ; Category:   Drug ; Substance:   opthmolic eye drops ; Type:   Allergy ; Updated By:   Michelle Zhao; Source:   Paper Chart ; Reviewed Date:   4/4/2019 1:00 PM CDT      Vicodin  Estimated Onset Date:   Unspecified ; Reactions:   behavioral disturbances ; Created By:   Elyse Anne RN; Reaction Status:   Active ; Category:   Drug ; Substance:   Vicodin ; Type:   Allergy ; Updated By:   Elyse Anne RN; Reviewed Date:   4/4/2019 1:00 PM CDT      Zocor  Estimated Onset Date:   Unspecified ; Reactions:   Diarrhea ; Created By:   Michelle Zhao; Reaction Status:   Active ; Category:   Drug ; Substance:   Zocor ; Type:   Allergy ; Updated By:   Michelle Zhao; Reviewed Date:   4/4/2019 1:00 PM CDT        Medication List   (As Of: 4/4/2019 1:06:21 PM CDT)   Prescription/Discharge Order    atorvastatin  :   atorvastatin ; Status:   Prescribed ; Ordered As Mnemonic:   atorvastatin 80 mg oral tablet ; Simple Display Line:   80 mg, 1 tab(s), po, daily, 90 tab(s), 3 Refill(s) ; Ordering Provider:   Richard Conde MD; Catalog Code:   atorvastatin ; Order Dt/Tm:   9/18/2018 1:25:04 PM          furosemide  :   furosemide ; Status:   Prescribed ; Ordered As Mnemonic:   Lasix 40 mg oral tablet ; Simple Display Line:   40 mg, 1 tab(s), PO, Daily, 90 tab(s), 3 Refill(s) ; Ordering Provider:   Richard Conde MD; Catalog Code:   furosemide ; Order Dt/Tm:   9/18/2018 1:25:05 PM          metFORMIN  :   metFORMIN ; Status:   Prescribed ; Ordered As Mnemonic:   metFORMIN 500 mg oral tablet ; Simple Display Line:   500 mg, 1  tab(s), PO, BID, 180 tab(s), 3 Refill(s) ; Ordering Provider:   Richard Conde MD; Catalog Code:   metFORMIN ; Order Dt/Tm:   9/18/2018 1:25:08 PM          Miscellaneous Prescription  :   Miscellaneous Prescription ; Status:   Prescribed ; Ordered As Mnemonic:   moldable orthotics ; Simple Display Line:   See Instructions, use daily with diabetic shoes, 6 EA, 0 Refill(s) ; Ordering Provider:   Richard Conde MD; Catalog Code:   Miscellaneous Prescription ; Order Dt/Tm:   9/18/2018 1:29:55 PM          Miscellaneous Rx Supply  :   Miscellaneous Rx Supply ; Status:   Prescribed ; Ordered As Mnemonic:   custom fit diabetic shoes ; Simple Display Line:   See Instructions, wear daily for heel pain, neuropathy, venous insufficiency, 2 EA, 0 Refill(s) ; Ordering Provider:   Richard Conde MD; Catalog Code:   Miscellaneous Rx Supply ; Order Dt/Tm:   9/18/2018 1:29:36 PM          oxybutynin  :   oxybutynin ; Status:   Prescribed ; Ordered As Mnemonic:   Ditropan XL 10 mg/24 hr oral tablet, extended release ; Simple Display Line:   10 mg, 1 tab(s), PO, Daily, 90 tab(s), 3 Refill(s) ; Ordering Provider:   Richard Conde MD; Catalog Code:   oxybutynin ; Order Dt/Tm:   9/18/2018 1:25:02 PM          potassium chloride  :   potassium chloride ; Status:   Prescribed ; Ordered As Mnemonic:   potassium chloride 20 mEq oral tablet, extended release ; Simple Display Line:   20 mEq, 1 tab(s), PO, daily, 90 tab(s), 3 Refill(s) ; Ordering Provider:   Richard Conde MD; Catalog Code:   potassium chloride ; Order Dt/Tm:   9/18/2018 1:25:07 PM          triamcinolone topical  :   triamcinolone topical ; Status:   Prescribed ; Ordered As Mnemonic:   triamcinolone 0.1% topical ointment ; Simple Display Line:   1 jonathan, top, tid, apply a thin film  to affected area  Area needs to be dry, 60 gm, 1 Refill(s) ; Ordering Provider:   Richard Conde MD; Catalog Code:   triamcinolone topical ; Order Dt/Tm:   12/11/2017 9:03:44 AM             Home Meds    acetaminophen  :   acetaminophen ; Status:   Documented ; Ordered As Mnemonic:   Tylenol ; Simple Display Line:   PO, PRN: as needed for pain ; Catalog Code:   acetaminophen ; Order Dt/Tm:   1/15/2010 1:47:50 PM          aspirin  :   aspirin ; Status:   Documented ; Ordered As Mnemonic:   aspirin 81 mg oral tablet ; Simple Display Line:   81 mg, 1 tab(s), PO, Daily, 30 tab(s) ; Catalog Code:   aspirin ; Order Dt/Tm:   3/22/2012 10:42:39 AM          calcium-vitamin D  :   calcium-vitamin D ; Status:   Documented ; Ordered As Mnemonic:   calcium (as carbonate)-vitamin D 600 mg-125 intl units oral tablet ; Simple Display Line:   1 tab(s), Oral, tid, 0 Refill(s) ; Catalog Code:   calcium-vitamin D ; Order Dt/Tm:   3/12/2019 11:50:47 AM          cholecalciferol  :   cholecalciferol ; Status:   Documented ; Ordered As Mnemonic:   Vitamin D3 1000 intl units oral capsule ; Simple Display Line:   1,000 International Unit, 1 cap(s), PO, daily ; Catalog Code:   cholecalciferol ; Order Dt/Tm:   4/16/2012 11:05:56 AM          multivitamin  :   multivitamin ; Status:   Documented ; Ordered As Mnemonic:   Daily Multiple Vitamins ; Simple Display Line:   PO, Daily ; Catalog Code:   multivitamin ; Order Dt/Tm:   1/15/2010 1:47:00 PM          naproxen  :   naproxen ; Status:   Documented ; Ordered As Mnemonic:   Aleve 220 mg oral capsule ; Simple Display Line:   220 mg, 1 cap(s), Oral, q12 hrs, 0 Refill(s) ; Catalog Code:   naproxen ; Order Dt/Tm:   3/12/2019 11:51:40 AM

## 2022-02-15 NOTE — TELEPHONE ENCOUNTER
"Subjective:       Patient ID: Julio Cesar Carter is a 21 y.o. male.    Chief Complaint: annual   HPI  Patient comes in today for an updated physical   He is a 20 yo male with no medical issues    Is active, mows lawn 2 days a week.  Otherwise, activites are indoors. 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 No pertinent past medical history.    No current outpatient medications on file.     No current facility-administered medications for this visit.        Review of Systems   Constitutional: Negative for fatigue, fever and unexpected weight change.   HENT: Negative for sore throat and trouble swallowing.    Respiratory: Negative for cough and shortness of breath.    Cardiovascular: Negative for chest pain.   Gastrointestinal: Negative for abdominal pain.   Hematological: Negative for adenopathy. Does not bruise/bleed easily.       Objective:   /78   Pulse 68   Temp 98.5 °F (36.9 °C) (Oral)   Ht 5' 7.32" (1.71 m)   Wt 94 kg (207 lb 3.7 oz)   BMI 32.15 kg/m²      Physical Exam   Constitutional: He is oriented to person, place, and time. He appears well-developed and well-nourished. No distress.   HENT:   Head: Normocephalic and atraumatic.   Right Ear: External ear normal.   Left Ear: External ear normal.   Nose: Nose normal.   Mouth/Throat: Oropharynx is clear and moist. No oropharyngeal exudate.   TMs normal    Eyes: Pupils are equal, round, and reactive to light. Conjunctivae and EOM are normal.   Neck: Normal range of motion. Neck supple.   Cardiovascular: Normal rate, regular rhythm and normal heart sounds.   Pulmonary/Chest: Effort normal and breath sounds normal.   Abdominal: Soft. Bowel sounds are normal.   Genitourinary:   Genitourinary Comments: Exam not done    Musculoskeletal: Normal range of " ---------------------  From: Heavenly Blakely RN   Sent: 10/19/2020 11:40:19 AM CDT  Subject: Phone Message     Phone Message    PCP:   AF[facility]AN      Time of Call:  1035       Person Calling:  Pt  Phone number:  [phone]    Returned call at: 1137    Note:   Pt called and left message stating she was in an accident last week and was seen in ED. She states that she was instructed to see her PCP. Returned call and informed her that she should have ED follow up w/[facility]. She expressed understanding. Transferred to schedule w/[facility].     Last office visit and reason:  7-14-20 Phone Visit w/[facility]   motion.   Neurological: He is alert and oriented to person, place, and time. He has normal reflexes.   Skin: Skin is warm.   Psychiatric: He has a normal mood and affect. His behavior is normal. Judgment and thought content normal.   Vitals reviewed.        Lab Results   Component Value Date    CHOL 132 01/31/2018    TRIG 90 01/31/2018    HDL 34 (L) 01/31/2018     01/31/2018    K 3.9 01/31/2018     01/31/2018    CREATININE 0.7 01/31/2018    BUN 11 01/31/2018    CO2 22 (L) 01/31/2018    TSH 1.525 01/31/2018       Assessment:       1. Routine general medical examination at a health care facility        Plan:   Routine general medical examination at a health care facility  -     Comprehensive metabolic panel; Future; Expected date: 08/14/2019  -     TSH; Future; Expected date: 08/14/2019  -     CBC auto differential; Future; Expected date: 08/14/2019  -     Insulin, random; Future; Expected date: 08/14/2019    Other orders  -     (In Office Administered) Hepatitis A Vaccine (Adult) (IM)      Discussed health, diet, lowering processed food intake    Follow up yearly and as needed

## 2022-02-15 NOTE — PROGRESS NOTES
Patient:   PAKO BARBER            MRN: [id number]            FIN: [id number]               Age:   78 years     Sex:  Female     :  1940   Associated Diagnoses:   Pneumonia   Author:   Negrito Abdul MD      Chief Complaint   2019 8:50 AM CDT    c/o Cough worsening, requesting chest Xray   Chief complaint and symptoms noted above confirmed with patient.      History of Present Illness             The patient presents with cough.  The cough is described as hacking and paroxysmal.  The severity of the cough is moderate.  The cough is constant.  The cough has lasted for 2 week(s).  no help from Amox.  Exacerbating factors consist of lying flat and recent illness.  Relieving factors consist of none.  Associated symptoms consist of chills, fever, denies chest pain, denies nasal congestion and denies rhinorrhea.        Review of Systems   Constitutional:  Negative except as documented in history of present illness.    Ear/Nose/Mouth/Throat:  Negative.    Respiratory:  Cough, Sputum production, WheezingNo shortness of breath, No hemoptysis, No cyanosis, No apnea.    Cardiovascular:  Negative.       Health Status   Allergies:    Allergic Reactions (Selected)  Severity Not Documented  Latex (No reactions were documented)  Opthmolic eye drops (Swelling of eye..)  Vicodin (Behavioral disturbances)  Zocor (Diarrhea)   Medications:  (Selected)   Prescriptions  Prescribed  Ditropan XL 10 mg/24 hr oral tablet, extended release: = 1 tab(s) ( 10 mg ), PO, Daily, # 90 tab(s), 3 Refill(s), Type: Maintenance, Pharmacy: [id number], 1 tab(s) Oral daily  Lasix 40 mg oral tablet: = 1 tab(s) ( 40 mg ), PO, Daily, # 90 tab(s), 3 Refill(s), Type: Maintenance, Pharmacy: [id number], 1 tab(s) Oral daily  amoxicillin 875 mg oral tablet: = 1 tab(s) ( 875 mg ), PO, BID, # 20 tab(s), 0 Refill(s), Type: Maintenance, Pharmacy: [facility] [id number] [facility], 1 tab(s) Oral bid,x10 day(s)  atorvastatin 80 mg oral tablet:  = 1 tab(s) ( 80 mg ), po, daily, # 90 tab(s), 3 Refill(s), Type: Maintenance, Pharmacy: [id number], 1 tab(s) Oral daily  custom fit diabetic shoes: custom fit diabetic shoes, See Instructions, Instructions: wear daily for heel pain, neuropathy, venous insufficiency, Supply, # 2 EA, 0 Refill(s), Type: Maintenance, Pharmacy: [facility], wear daily for heel pain, neuropathy, venous insufficiency  metFORMIN 500 mg oral tablet: = 1 tab(s) ( 500 mg ), PO, BID, # 180 tab(s), 3 Refill(s), Type: Maintenance, Pharmacy: [id number], 1 tab(s) Oral bid  moldable orthotics: moldable orthotics, See Instructions, Instructions: use daily with diabetic shoes, Supply, # 6 EA, 0 Refill(s), Type: Maintenance, Pharmacy: [facility], use daily with diabetic shoes  potassium chloride 20 mEq oral tablet, extended release: = 1 tab(s) ( 20 mEq ), PO, daily, # 90 tab(s), 3 Refill(s), Type: Maintenance, Pharmacy: [id number], 1 tab(s) Oral daily  triamcinolone 0.1% topical ointment: 1 jonathan, top, tid, Instructions: apply a thin film  to affected area  Area needs to be dry, # 60 gm, 1 Refill(s), Type: Maintenance, Pharmacy: [id number], 1 jonathan top tid,Instr:apply a thin film; to affected area; Area needs to be dry  Documented Medications  Documented  Aleve 220 mg oral capsule: = 1 cap(s) ( 220 mg ), Oral, q12 hrs, 0 Refill(s), Type: Maintenance  Daily Multiple Vitamins: PO, Daily, 0  Tylenol: PO, PRN: as needed for pain, 0 Refill(s), Type: Maintenance  Vitamin D3 1000 intl units oral capsule: 1 cap(s) ( 1,000 International Unit ), PO, daily, 0 Refill(s), Type: Soft Stop  aspirin 81 mg oral tablet: 1 tab(s) ( 81 mg ), PO, Daily, # 30 tab(s), 0 Refill(s), Type: Maintenance  calcium (as carbonate)-vitamin D 600 mg-125 intl units oral tablet: 1 tab(s), Oral, tid, 0 Refill(s), Type: Maintenance   Problem list:    All Problems  Hyperplastic Colonic Polyp / SNOMED CT 410782260 / Confirmed  Buttock Pain /  ICD-9-.1 / Confirmed  Chronic Venous Insufficiency / ICD-9-.81 / Confirmed  CVA, old, cognitive deficits / ICD-9-.0 / Confirmed  Diabetes mellitus with diabetic neuropathy / SNOMED CT 834079780 / Confirmed  EDEMA / ICD-9-.3 / Confirmed  Metabolic Syndrome / ICD-9-.7 / Confirmed  Mixed hyperlipidemia / SNOMED CT 203686969 / Confirmed  OA (Osteoarthritis) / ICD-9-.90 / Confirmed  Obesity / ICD-9-.00 / Probable  Osteoporosis / ICD-9-.00 / Confirmed  Polio / ICD-9-.90 / Confirmed  Diabetes mellitus, type 2 / SNOMED CT 836941293 / Confirmed  Varicose Veins of Legs / ICD-9-.9 / Confirmed  Inactive: Ischial Bursitis / ICD-9-.5  Inactive: Tear of Medial Meniscus of Knee / ICD-9-.0  Resolved: Fracture of Wrist / ICD-9-.00  Resolved: Inpatient stay / SNOMED CT 221032237  Resolved: Stroke / ICD-9-  Resolved: Tobacco user / ICD-9-.1  Canceled: Diabetes Mellitus / ICD-9-.00  Canceled: Dyslipidemia / ICD-9-.4      Histories   Past Medical History:    Active  Hyperplastic Colonic Polyp (SNOMED CT 932701077): Onset in 2004 at 64 years.  Buttock Pain (ICD-9-.1): Onset in 2004 at 63 years.  Comments:  5/8/2013 CDT 11:24 AM CDT - Michelle Zhao  Work-related fall.  Chronic left buttock pain since then.  OA (Osteoarthritis) (ICD-9-.90)  Osteoporosis (ICD-9-.00)  EDEMA (ICD-9-.3)  Metabolic Syndrome (ICD-9-.7)  Polio (ICD-9-.90)  Chronic Venous Insufficiency (ICD-9-.81)  Varicose Veins of Legs (ICD-9-.9)  Resolved  Inpatient stay (SNOMED CT 481223332): Onset on 10/10/2018 at 78 years.  Resolved on 10/13/2018 at 78 years.  Comments:  10/18/2018 CDT 9:42 AM CDT - [facility] [place] - Acute and chronic right-sided weakness.  Fracture of Wrist (ICD-9-.00): Onset in 2004 at 63 years.  Resolved.  Comments:  5/8/2013 CDT 11:22 AM CDT - Michelle Zhao    5/8/2013 CDT 11:25 AM  Michelle Preston  Work-related fall.  Stroke (ICD-9-): Onset on 7/14/2003 at 63 years.  Resolved.  Comments:  5/8/2013 CDT 12:03 PM Michelle Preston  Acute CVA with right-sided weakness.  Tobacco user (ICD-9-.1):  Resolved.  Comments:  5/8/2013 CDT 11:46 AM Michelle Preston  Patient states that she quit in 1989.   Family History:    Heart disease  Mother  Grandmother (M)  Brother  Bladder cancer..  Mother     Procedure history:    Extracapsular cataract extraction and insertion of intraocular lens (SNOMED CT 040762338) on 9/15/2016 at 76 Years.  Comments:  10/10/2016 1:01 PM BOBT - Rachel Ivan  Right.  Extracapsular cataract extraction and insertion of intraocular lens (SNOMED CT 957432075) performed by Ethan Somers MD on 9/1/2016 at 76 Years.  Comments:  9/8/2016 2:37 PM BOBT - Rachel Ivan  Left.  Colonoscopy (SNOMED CT 128749569) performed by Demond Knight MD on 6/25/2014 at 74 Years.  Comments:  6/25/2014 9:16 AM Demond Mccoy MD  Moderate left diberticulosis. No polyps. Repeat 10 years.  Sclerotherapy of vein of lower limb (SNOMED CT 6460638207) in 2010 at 70 Years.  Comments:  5/8/2013 11:40 AM Michelle Preston  Left leg in 03/2010.  Right leg in 04/2010.  [facility].  Laser ablation of long saphenous vein (SNOMED CT 2275758906) on 8/3/2009 at 69 Years.  Comments:  5/8/2013 11:45 AM Michelle Preston  Right great and right accessory saphenous veins.  Fluoroscopically-guided needle placement for injection of ischial bursa on the left. on 10/10/2008 at 68 Years.  Comments:  5/8/2013 11:49 AM Michelle Preston  [facility].  Bone density scan (SNOMED CT 713456320) on 5/31/2007 at 67 Years.  Comments:  5/8/2013 11:58 AM Michelle Preston  Osteopenia  Fracture of Wrist (ICD-9-.00) in 2004 at 64 Years.  Comments:  5/8/2013 11:54 AM CDT - Michelle Zhao  Left  Colonoscopy (SNOMED CT 152630577) performed by Mike Swan MD on 4/1/2004 at 63  Years.  Comments:  2011 7:35 AM CDT - Marzena Rachel  Repeat in 10 yrs.    10/4/2010 11:58 AM CDT - Antonia DAMON, Demond  diverticulosis. single hyperplastic rectal polyp  Bone density scan (SNOMED CT 241535162) in  at 62 Years.  Arthroscopy of knee (SNOMED CT 054128499) in  at 60 Years.  Comments:  2013 12:08 PM CDT - Michelle Zhao  Left knee.  Flexible sigmoidoscopy (SNOMED CT 48034731) performed by Koko Abdul MD on 1999 at 59 Years.  Arthroscopy of Knee (ICD-9-CM 80.26) in  at 58 Years.   section (SNOMED CT 62749448).  Comments:  2013 11:52 AM CDT - Zhao Michelle  1960's    2010 12:42 PM JEAN - Gonsalo Mila   3, Para 3   Social History:        Alcohol Assessment: Denies Alcohol Use            Never      Tobacco Assessment: Past            Past, Cigarettes, 15 per day.  Started age 19 Years.  Stopped age 44 Years.      Substance Abuse Assessment: Denies Substance Abuse      Employment and Education Assessment            Retired      Home and Environment Assessment            Marital status: .  3 children.      Other Assessment            Marital status,       Physical Examination   Vital Signs   2019 8:50 AM CDT Temperature Tympanic 98.0 DegF    Peripheral Pulse Rate 68 bpm    HR Method Electronic    Systolic Blood Pressure 122 mmHg    Diastolic Blood Pressure 70 mmHg    Mean Arterial Pressure 87 mmHg    BP Site Left arm    BP Method Manual    Oxygen Saturation 95 %      Measurements from flowsheet : Measurements   2019 8:50 AM CDT    Height Measured - Standard                64 in     Vital Signs Stable per flow sheet .   General:  Alert and oriented, No acute distress.    Eye:  Normal conjunctiva.    HENT:  Normocephalic, Oral mucosa is moist, No pharyngeal erythema, No sinus tenderness.    Neck:  Supple, Non-tender, No lymphadenopathy, No thyromegaly.    Respiratory:  Respirations are non-labored, Breath sounds are equal, Symmetrical  chest wall expansion, No chest wall tenderness.         Breath sounds: Diminished.    Cardiovascular:  Normal rate, Regular rhythm, Normal peripheral perfusion, No edema.       Review / Management   Chest x-ray results   Posterior/anterior, Lateral, Looks OK to me, will have radiology over read      Impression and Plan   Diagnosis     Pneumonia ([id number] B96.0).     Course:  Not improving.    Patient Instructions:       Counseled: Patient, Verbalized understanding.    Orders     Orders   Pharmacy:  doxycycline monohydrate 100 mg oral tablet (Prescribe): = 1 tab(s) ( 100 mg ), PO, bid, x 10 day(s), # 20 tab(s), 0 Refill(s), Type: Acute, Pharmacy: [facility] [id number] [facility], 1 tab(s) Oral bid,x10 day(s).     Symptomatic Treatment.     Orders     F/U  if not improving, sooner if getting worse.        Professional Services   Counseling Summary:  This was a 25 minute visit with greater than 50% of that time spent counseling the patient, and coordination of care..    Counseling included differential diagnoses, treatment options, risks and benefits, current condition, medications, and dose adjustments.    The patient was interactive, attentive, asked questions, and verbalized understanding.

## 2022-02-15 NOTE — PROGRESS NOTES
Patient:   PAKO BARBER            MRN: [id number]            FIN: [id number]               Age:   77 years     Sex:  Female     :  1940   Associated Diagnoses:   Neck pain; Headache   Author:   Richard Conde MD      Chief Complaint   3/26/2018 1:33 PM CDT    c/o headache x Saturday      History of Present Illness   Patient with 3 days of left sided headache. Starts in the neck, radiates up from neck into top of head and into forehead. Throbbing pain. Not shooting.  No recent falls.   Has used aleve which helps but then pain has returned.   Nothing exacerbates pain.   No congestion, no cough. No fevers.   Started on Saturday. Will get bursts of pain that are grabbing and intense and then linger.  No rash.   Does not recall previous episodes like this.          Health Status   Allergies:    Allergic Reactions (All)  Severity Not Documented  Latex (No reactions were documented)  Opthmolic eye drops (Swelling of eye..)  Vicodin (Behavioral disturbances)  Zocor (Diarrhea)   Medications:  (Selected)   Prescriptions  Prescribed  Ditropan XL 10 mg/24 hr oral tablet, extended release: 1 tab(s) ( 10 mg ), PO, Daily, # 90 tab(s), 1 Refill(s), Type: Maintenance, Pharmacy: [id number], 1 tab(s) po daily  Lasix 20 mg oral tablet: 1 tab(s) ( 20 mg ), PO, Daily, # 90 tab(s), 1 Refill(s), Type: Maintenance, Pharmacy: [facility] #[id number], 1 tab(s) po daily  MOLDABLE ORTHOTICS MG APPLY: See Instructions, Instructions: USE DAILY WITH DIABETIC SHOES E11.9, # 6 unknown unit, Type: Soft Stop, Pharmacy: [facility], USE DAILY WITH DIABETIC SHOES E11.9  atorvastatin 80 mg oral tablet: 1 tab(s) ( 80 mg ), po, daily, # 90 tab(s), 1 Refill(s), Type: Maintenance, Pharmacy: [facility] #[id number], 1 tab(s) po daily  custom fit diabetic shoes: custom fit diabetic shoes, See Instructions, Instructions: wear daily for heel pain, neuropathy, venous insufficiency, Supply, # 2 EA, 0 Refill(s), Type: Maintenance  triamcinolone  0.1% topical ointment: 1 jonathan, top, tid, Instructions: apply a thin film  to affected area  Area needs to be dry, # 60 gm, 1 Refill(s), Type: Maintenance, Pharmacy: [id number], 1 jonathan top tid,Instr:apply a thin film; to affected area; Area needs to be dry  Documented Medications  Documented  Daily Multiple Vitamins: PO, Daily, 0  Tylenol: PO, PRN: as needed for pain, 0 Refill(s), Type: Maintenance  Vitamin D3 1000 intl units oral capsule: 1 cap(s) ( 1,000 International Unit ), PO, daily, 0 Refill(s), Type: Soft Stop  aspirin 81 mg oral tablet: 1 tab(s) ( 81 mg ), PO, Daily, # 30 tab(s), 0 Refill(s), Type: Maintenance   Problem list:    All Problems (Selected)  Buttock Pain / ICD-9-.1 / Confirmed  Chronic Venous Insufficiency / ICD-9-.81 / Confirmed  CVA, old, cognitive deficits / ICD-9-.0 / Confirmed  Diabetes Mellitus / ICD-9-.00 / Confirmed  EDEMA / ICD-9-.3 / Confirmed  Hyperplastic Colonic Polyp / SNOMED CT 932740829 / Confirmed  Metabolic Syndrome / ICD-9-.7 / Confirmed  Mixed hyperlipidemia / SNOMED CT 591079498 / Confirmed  OA (Osteoarthritis) / ICD-9-.90 / Confirmed  Obesity / ICD-9-.00 / Probable  Osteoporosis / ICD-9-.00 / Confirmed  Polio / ICD-9-.90 / Confirmed  Varicose Veins of Legs / ICD-9-.9 / Confirmed      Histories   Past Medical History:    Active  Diabetes Mellitus (ICD-9-.00): Onset on 5/1/2007 at 66 years.  Comments:  5/8/2013 CDT 11:12 AM Michelle Preston  Type 2  Hyperplastic Colonic Polyp (SNOMED CT 435024869): Onset in 2004 at 64 years.  Buttock Pain (ICD-9-.1): Onset in 2004 at 63 years.  Comments:  5/8/2013 CDT 11:24 AM Michelle Preston  Work-related fall.  Chronic left buttock pain since then.  OA (Osteoarthritis) (ICD-9-.90)  Osteoporosis (ICD-9-.00)  EDEMA (ICD-9-.3)  Metabolic Syndrome (ICD-9-.7)  Polio (ICD-9-.90)  Chronic Venous Insufficiency (ICD-9-CM  459.81)  Varicose Veins of Legs (ICD-9-.9)  Resolved  Fracture of Wrist (ICD-9-.00): Onset in 2004 at 63 years.  Resolved.  Comments:  5/8/2013 CDT 11:22 AM Michelle Preston  Left    5/8/2013 CDT 11:25 AM Michelle Preston  Work-related fall.  Stroke (ICD-9-): Onset on 7/14/2003 at 63 years.  Resolved.  Comments:  5/8/2013 CDT 12:03 PM Michelle Preston  Acute CVA with right-sided weakness.  Tobacco user (ICD-9-.1):  Resolved.  Comments:  5/8/2013 CDT 11:46 AM Michelle Preston  Patient states that she quit in 1989.   Family History:    Heart disease  Mother  Grandmother (M)  Brother  Bladder cancer..  Mother     Procedure history:    Extracapsular cataract extraction and insertion of intraocular lens ([id number]) on 9/15/2016 at 76 Years.  Comments:  10/10/2016 1:01 PM - Rachel Ivan  Right.  Extracapsular cataract extraction and insertion of intraocular lens ([id number]) on 9/1/2016 at 76 Years.  Comments:  9/8/2016 2:37 PM - Rachel Ivan.  Colonoscopy ([id number]) on 6/25/2014 at 74 Years.  Comments:  6/25/2014 9:16 AM - Demond Knight MD  Moderate left diberticulosis. No polyps. Repeat 10 years.  Sclerotherapy of vein of lower limb (1559830012) in 2010 at 70 Years.  Comments:  5/8/2013 11:40 Michelle Kauffman  Left leg in 03/2010.  Right leg in 04/2010.  [facility].  Laser ablation of long saphenous vein ([id number]) on 8/3/2009 at 69 Years.  Comments:  5/8/2013 11:45 Michelle Kauffman  Right great and right accessory saphenous veins.  Fluoroscopically-guided needle placement for injection of ischial bursa on the left. on 10/10/2008 at 68 Years.  Comments:  5/8/2013 11:49 Michelle Kauffman  [facility].  Bone density scan ([id number]) on 5/31/2007 at 67 Years.  Comments:  5/8/2013 11:58 CHHAYA - Michelle Zhao  Osteopenia  Fracture of Wrist (814.00) in 2004 at 64 Years.  Comments:  5/8/2013 11:54 Michelle Kauffman  Left  Colonoscopy ([id number]) on 4/1/2004 at 63  Years.  Comments:  2011 7:35 AM - Marzena Rachel  Repeat in 10 yrs.    10/4/2010 11:58 AM - Antonia DAMON, Demond  diverticulosis. single hyperplastic rectal polyp  Bone density scan (141793653) in  at 62 Years.  Arthroscopy of knee ([id number]) in  at 60 Years.  Comments:  2013 12:08 PM - Michelle Zhao  Left knee.  Flexible sigmoidoscopy (38375124) on 1999 at 59 Years.  Arthroscopy of Knee (80.26) in  at 58 Years.   section ([id number]).  Comments:  2013 11:52 AM - Bonnie Zhaoe  1960's    2010 12:42 PM - Gonsalo Mila   3, Para 3   Social History:        Alcohol Assessment: Denies Alcohol Use            Never      Tobacco Assessment: Past            Past, Cigarettes, 15 per day.  Started age 19 Years.  Stopped age 44 Years.      Substance Abuse Assessment: Denies Substance Abuse      Employment and Education Assessment            Retired      Home and Environment Assessment            Marital status: .  3 children.      Other Assessment            Marital status,         Physical Examination   Vital Signs   3/26/2018 1:33 PM CDT Temperature Tympanic 97.3 DegF  LOW    Peripheral Pulse Rate 72 bpm    Pulse Site Radial artery    HR Method Manual    Systolic Blood Pressure 132 mmHg  HI    Diastolic Blood Pressure 70 mmHg    Mean Arterial Pressure 91 mmHg    BP Site Left arm    BP Method Manual      Measurements from flowsheet : Measurements   3/26/2018 1:33 PM CDT Height Measured - Standard 63 in    Weight Measured - Standard 199 lb    BSA 2 m2    Body Mass Index 35.25 kg/m2  HI      General:  Alert and oriented, Mild distress, seems to be comfortable but then has sudden grabbing pain, clutching at back of neck and then forehead.    Eye:  Pupils are equal, round and reactive to light, Extraocular movements are intact.    HENT:  Normocephalic, Tympanic membranes are clear, Oral mucosa is moist, No pharyngeal erythema.    Respiratory:  Lungs are clear to auscultation,  Respirations are non-labored.    Cardiovascular:  Normal rate, Regular rhythm.    Integumentary:  No rash.    Neurologic:  Alert, Oriented, No focal deficits, Cranial Nerves II-XII are grossly intact.    Cognition and Speech:  Speech clear and coherent, Functional cognition intact.    Psychiatric:  Cooperative.       Review / Management   Results review:  Lab results   3/26/2018 3:24 PM CDT Sed Rate TR 26 mm/hr   3/26/2018 2:20 PM CDT WBC TR 5.3 x10^3/uL    RBC TR 4.13 x10^6/uL    Hgb TR 12.5 g/dL    Hct TR 40.4 %    MCV TR 97.7 fL    MCH TR 30.2 pg    MCHC TR 31.0 gm/dL    RDW TR 13.0 %    Platelet  x10^3/uL   .       Impression and Plan   Diagnosis     Neck pain (FVR46-YV M54.2).     Headache (ICK28-SH R51).     Plan:  suspicious for neuropathic pain. Given neck involvement, doubt trigeminal neuralgia. No rash, so shingles not likely. Will start prednisone. Check labs. Follow up if not improving, or if fever or rash..    Orders     Orders (Selected)   Prescriptions  Prescribed  predniSONE 20 mg oral tablet: 2 tab(s) ( 40 mg ), po, daily, # 10 tab(s), 0 Refill(s), Type: Maintenance, Pharmacy: [id number], 2 tab(s) po daily,x5 day(s).

## 2022-02-15 NOTE — LETTER
(Inserted Image. Unable to display)   March 20, 2019      PAKO BARBER  [address]   [place], [place] [id number]        Dear PAKO,      Thank you for selecting [facility] (previously [facility] for your healthcare needs.     Our records indicate you are due for the following services:     Diabetic Exam ~ Please bring your glucose meter and/or your blood glucose diary to your appointment.    To schedule an appointment or if you have further questions, please contact your primary clinic:   [facility]          [phone]   [facility]    [phone]             [facility]         [phone]      Powered by [employer]    Sincerely,    Richard Conde M.D.

## 2022-02-15 NOTE — PROGRESS NOTES
Patient:   PAKO BARBER            MRN: [id number]            FIN: [id number]               Age:   80 years     Sex:  Female     :  1940   Associated Diagnoses:   Right shoulder injury; Urinary frequency   Author:   Richard Conde MD      Chief Complaint   2020 1:36 PM CST    Discuss R arm pain d/t fall 5 weeks ago. Doing PT but ongoing pain and finger concerns.      History of Present Illness   patient with fall 5 weeks ago landing on right shoulder  reviewed prior note from October as well as PT eval  patient symptoms are minimally improving  had ER visit as previously noted  notes that shoulder remains sore, decreased ROM  gets spasms in right forearm that extends into fingers  discussed ortho referral but patient declines    patient with history of venous insufficiency, has seen vascular surgery  persistent leg swelling and redness  has seen vascular surgery, compression is recommended but patient unable to put on compression stockings    also discussed urinary symptoms, patient not noticing improvement with oxybutinin  needs to void regularly, has urgency, struggles with incontinence            Health Status   Allergies:    Allergic Reactions (All)  Severity Not Documented  Latex (No reactions were documented)  Opthmolic eye drops (Swelling of eye..)  Vicodin (Behavioral disturbances)  Zocor (Diarrhea)   Medications:  (Selected)   Prescriptions  Prescribed  Ditropan XL 10 mg/24 hr oral tablet, extended release: = 1 tab(s) ( 10 mg ), PO, Daily, # 90 tab(s), 3 Refill(s), Type: Maintenance, Pharmacy: [facility] , 1 tab(s) Oral daily, 64, in, 20 17:14:00 CDT, Height Measured, 185, lb, 20 9:39:00 CST, Weight Measured  Klor-Con M20 oral tablet, extended release: = 1 tab(s) ( 20 mEq ), Oral, bid, # 180 tab(s), 3 Refill(s), Type: Maintenance, Pharmacy: [id number], 1 tab(s) Oral bid  Moldable inserts for diabetic shoes: Moldable inserts for diabetic shoes,  See Instructions, Instructions: use with diabetic shoes, Supply, # 2 EA, 0 Refill(s), Type: Maintenance, Pharmacy: [facility], use with diabetic shoes, 64, in, 07/14/20 17:14:00 CDT, Height Measured, 185, lb, 01/...  aspirin 81 mg oral delayed release tablet: = 1 tab(s) ( 81 mg ), Oral, daily, # 30 tab(s), 0 Refill(s), Type: Maintenance, OTC (Rx)  atorvastatin 80 mg oral tablet: = 1 tab(s), Oral, daily, # 90 tab(s), 3 Refill(s), Type: Maintenance, Pharmacy: [id number], 1 tab(s) Oral daily  azelastine 0.05% ophthalmic solution: See Instructions, Instructions: INSTILL 1 DROP INTO BOTH EYES TWICE A DAY AS NEEDED FOR ALLERGY SYMPTOMS, # 18 mL, 2 Refill(s), Type: Acute, Pharmacy: [id number], INSTILL 1 DROP INTO BOTH EYES TWICE A DAY AS NEEDED FOR ALLERGY SYMPTOMS  custom fit diabetic shoes: custom fit diabetic shoes, See Instructions, Instructions: wear daily for heel pain, neuropathy, venous insufficiency, Supply, # 2 EA, 0 Refill(s), Type: Maintenance, Pharmacy: [facility], wear daily for heel pain, neuropathy, venous insufficiency,...  electric recliner and lift chair: electric recliner and lift chair, See Instructions, Instructions: use for sitting and for elevating legs, Supply, # 1 EA, 0 Refill(s), Type: Maintenance  furosemide 40 mg oral tablet: = 1 tab(s), Oral, daily, # 90 tab(s), 1 Refill(s), Type: Maintenance, Pharmacy: [facility], 1 tab(s) Oral daily, 64, in, 10/28/20 12:13:00 CDT, Height Measured, 185, lb, 01/20/20 9:39:00 CST,...  metFORMIN 500 mg oral tablet: = 1 tab(s), Oral, bid, # 180 tab(s), 1 Refill(s), Type: Maintenance, Pharmacy: [facility], 1 tab(s) Oral bid, 64, in, 10/28/20 12:13:00 CDT, Height Measured, 185, lb, 01/20/20 9:39:00 CST, We...  moldable orthotics: moldable orthotics, See Instructions, Instructions: use daily with diabetic shoes,  Supply, # 6 EA, 0 Refill(s), Type: Maintenance, Pharmacy: [facility], use daily with diabetic shoes  nystatin 100,000 units/g topical cream: See Instructions, Instructions: APPLY TO THE AFFECTED AREA(S) THREE TIMES DAILY, # 30 gm, 2 Refill(s), Type: Acute, Pharmacy: [facility], APPLY TO THE AFFECTED AREA(S) THREE TIMES DAILY, 64, in, 07/14/20 17:14:00 CDT, Height Measured,...  Documented Medications  Documented  Aleve 220 mg oral capsule: = 1 cap(s) ( 220 mg ), Oral, q12 hrs, 0 Refill(s), Type: Maintenance  Tylenol: PO, PRN: as needed for pain, 0 Refill(s), Type: Maintenance  Vitamin D3 1000 intl units oral capsule: 1 cap(s) ( 1,000 International Unit ), PO, daily, 0 Refill(s), Type: Soft Stop   Problem list:    All Problems (Selected)  Hyperplastic Colonic Polyp / SNOMED CT 440683240 / Confirmed  Chronic Venous Insufficiency / ICD-9-.81 / Confirmed  CVA, old, cognitive deficits / ICD-9-.0 / Confirmed  Diabetes mellitus with diabetic neuropathy / SNOMED CT 734462174 / Confirmed  EDEMA / ICD-9-.3 / Confirmed  Metabolic Syndrome / ICD-9-.7 / Confirmed  Mixed hyperlipidemia / SNOMED CT 019908406 / Confirmed  OA (Osteoarthritis) / ICD-9-.90 / Confirmed  Obesity / ICD-9-.00 / Probable  Osteoporosis / ICD-9-.00 / Confirmed  Polio / ICD-9-.90 / Confirmed  Diabetes mellitus, type 2 / SNOMED CT 676774604 / Confirmed  Urge incontinence / SNOMED CT 887504091 / Confirmed  Varicose Veins of Legs / ICD-9-.9 / Confirmed      Histories   Past Medical History:    Active  Hyperplastic Colonic Polyp (SNOMED CT 236606925): Onset in 2004 at 64 years.  OA (Osteoarthritis) (ICD-9-.90)  Osteoporosis (ICD-9-.00)  EDEMA (ICD-9-.3)  Metabolic Syndrome (ICD-9-.7)  Polio (ICD-9-.90)  Chronic Venous Insufficiency (ICD-9-.81)  Varicose Veins of Legs (ICD-9-.9)  Resolved  Inpatient stay (SNOMED CT 190907031): Onset on 10/10/2018 at 78  years.  Resolved on 10/13/2018 at 78 years.  Comments:  10/18/2018 CDT 9:42 AM CDT - Marzena [facility] [place] - Acute and chronic right-sided weakness.  Fracture of Wrist (ICD-9-.00): Onset in 2004 at 63 years.  Resolved.  Comments:  5/8/2013 CDT 11:22 AM BOBT Heike Zhao Michelle  Left    5/8/2013 CDT 11:25 AM CDT Michelle Hall  Work-related fall.  Buttock Pain (ICD-9-.1): Onset in 2004 at 63 years.  Resolved.  Comments:  5/8/2013 CDT 11:24 AM CDT Michelle Hall  Work-related fall.  Chronic left buttock pain since then.  Stroke (ICD-9-): Onset on 7/14/2003 at 63 years.  Resolved.  Comments:  5/8/2013 CDT 12:03 PM BOBT Michelle Hall  Acute CVA with right-sided weakness.  Tobacco user (ICD-9-.1):  Resolved.  Comments:  5/8/2013 CDT 11:46 AM Michelle Preston  Patient states that she quit in 1989.      Physical Examination   Vital Signs   12/9/2020 1:36 PM CST Temperature Tympanic 97.4 DegF  LOW    Peripheral Pulse Rate 81 bpm    Systolic Blood Pressure 102 mmHg    Diastolic Blood Pressure 62 mmHg    Mean Arterial Pressure 75 mmHg    BP Site Right arm    BP Method Manual    Oxygen Saturation 96 %      Measurements from flowsheet : Measurements   12/9/2020 1:36 PM CST Height Measured - Standard 64 in    Weight Measured - Standard 188 lb    BSA 1.96 m2    Body Mass Index 32.27 kg/m2  HI      General:  Alert and oriented, No acute distress.    HENT:  Normocephalic, Oral mucosa is moist.    Genitourinary:  No costovertebral angle tenderness.    Musculoskeletal:  decreased ROM to right shoulder and right middle and ring fingers, no deformity.       Review / Management   Results review:  Lab results   12/9/2020 2:19 PM CST UA Epithelial Cells Moderate    UA WBC 3-5    UA RBC 0-2    UA Bacteria Few   12/9/2020 2:07 PM CST UA pH 7.0    UA Specific Gravity 1.025    UA Glucose NEGATIVE    UA Bilirubin NEGATIVE    UA Ketones NEGATIVE    Urine Occult Blood TRACE    UA Protein NEGATIVE    UA Nitrite  NEGATIVE    UA Leukocyte Esterase NEGATIVE     .       Impression and Plan   Diagnosis     Right shoulder injury ([id number]-NM S40.911D).     Plan:  suspect rotator cuff injury, consider MRI but patient does not want to pursue at this time. Notes pain is ongoing issue. Tylenol is not helping  Will trial topical antiinflammatory..    Orders     Orders (Selected)   Prescriptions  Prescribed  diclofenac 1% topical gel: ( 2 gm ), Topical, qid, Instructions: not to exceed 8 grams/day/single joint of upper extremities, # 240 gm, 5 Refill(s), Type: Maintenance, Pharmacy: [facility], 2 gm Topical qid,Instr:not t....       Diagnosis     Urinary frequency (XXI74-HT R35.0).     Course:  UA is normal. Consistent with mixed incontinence Would like to try alternate medication. Stop oxybutinin. Trial Toviaz. Follow up as needed if not improving. .    Orders     Orders (Selected)   Prescriptions  Prescribed  Toviaz 8 mg oral tablet, extended release: = 1 tab(s) ( 8 mg ), Oral, daily, Instructions: oxybutinin not effective, # 90 tab(s), 3 Refill(s), Type: Maintenance, Pharmacy: [facility], 1 tab(s) Oral daily,Instr:oxybutinin not effective....       Venous insuffiiciency and leg swelling is unchanged. Would benefit from compression but unable to manage. Encouraged to elevate legs.

## 2022-02-15 NOTE — TELEPHONE ENCOUNTER
Entered by Kusum Alexander CMA on June 26, 2019 10:54:37 AM CDT  ---------------------  From: Kusum Alexander CMA   To: [facility] [id number] [facility]    Sent: 6/26/2019 10:54:37 AM CDT  Subject: Medication Management     ** Not Approved: Patient has requested refill too soon, Patient states she has enough until appt with PCP **  potassium chloride (POTASSIUM CL ER 20 MEQ TABLET)  TAKE 1 TABLET BY MOUTH EVERY DAY  Qty:  90 tab(s)        Days Supply:  90        Refills:  2          Substitutions Allowed     Route To Pharmacy - [place] [id number] [facility]   Signed by Kusum Alexander CMA              ** Patient matched by Kusum Alexander CMA on 6/26/2019 9:52:53 AM CDT **      ------------------------------------------  From: [facility] [id number] [facility]  To: Richard Conde MD  Sent: June 25, 2019 9:37:00 AM CDT  Subject: Medication Management  Due: June 26, 2019 9:37:00 AM CDT    ** On Hold Pending Signature **  Drug: potassium chloride (potassium chloride 20 mEq oral tablet, extended release)  TAKE 1 TABLET BY MOUTH EVERY DAY  Quantity: 270 tab(s)    Days Supply: 90        Refills: 0  Substitutions Allowed  Notes from Pharmacy:     Dispensed Drug: potassium chloride (potassium chloride 20 mEq oral tablet, extended release)  TAKE 1 TABLET BY MOUTH EVERY DAY  Quantity: 90 tab(s)     Days Supply: 90        Refills: 2  Substitutions Allowed  Notes from Pharmacy:   ------------------------------------------

## 2022-02-15 NOTE — NURSING NOTE
Comprehensive Intake Entered On:  9/19/2019 10:23 AM CDT    Performed On:  9/19/2019 10:14 AM CDT by Eliana Sibley MA               Summary   Chief Complaint :   Follow up Cellulitis, legs feels like they are burning    Advance Directive :   Yes   Weight Measured :   189.6 lb(Converted to: 189 lb 10 oz, 86.00 kg)    Height Measured :   64 in(Converted to: 5 ft 4 in, 162.56 cm)    Body Mass Index :   32.54 kg/m2 (HI)    Body Surface Area :   1.97 m2   Systolic Blood Pressure :   122 mmHg   Diastolic Blood Pressure :   70 mmHg   Mean Arterial Pressure :   87 mmHg   Peripheral Pulse Rate :   66 bpm   BP Site :   Right arm   Pulse Site :   Radial artery   BP Method :   Manual   HR Method :   Manual   Temperature Tympanic :   98.0 DegF(Converted to: 36.7 DegC)    Oxygen Saturation :   95 %   Eliana Sibley MA - 9/19/2019 10:14 AM CDT   Health Status   Allergies Verified? :   Yes   Medication History Verified? :   Yes   Immunizations Current :   Yes   Medical History Verified? :   Yes   Pre-Visit Planning Status :   Completed   Tobacco Use? :   Former smoker   Eliana Sibley MA - 9/19/2019 10:14 AM CDT   Consents   Consent for Immunization Exchange :   Consent Granted   Consent for Immunizations to Providers :   Consent Granted   Eliana Sibley MA - 9/19/2019 10:14 AM CDT   Meds / Allergies   (As Of: 9/19/2019 10:23:40 AM CDT)   Allergies (Active)   Latex  Estimated Onset Date:   Unspecified ; Created By:   Ramila Mayes CMA; Reaction Status:   Active ; Category:   Environment ; Substance:   Latex ; Type:   Allergy ; Updated By:   Ramila Mayes CMA; Reviewed Date:   9/19/2019 10:20 AM CDT      opthmolic eye drops  Estimated Onset Date:   Unspecified ; Reactions:   Swelling of eye.. ; Comments:     Comment 1: Gentamycin opthalmic   ; Created By:   Michelle Zhao; Reaction Status:   Active ; Category:   Drug ; Substance:   opthmolic eye drops ; Type:   Allergy ; Updated By:   Michelle Zhao; Source:   Paper Chart ; Reviewed  Date:   9/19/2019 10:20 AM CDT      Vicodin  Estimated Onset Date:   Unspecified ; Reactions:   behavioral disturbances ; Created By:   Elyse Anne RN; Reaction Status:   Active ; Category:   Drug ; Substance:   Vicodin ; Type:   Allergy ; Updated By:   Elyse Anne RN; Reviewed Date:   9/19/2019 10:20 AM CDT      Zocor  Estimated Onset Date:   Unspecified ; Reactions:   Diarrhea ; Created By:   Michelle Zhao; Reaction Status:   Active ; Category:   Drug ; Substance:   Zocor ; Type:   Allergy ; Updated By:   Michelle Zhao; Reviewed Date:   9/19/2019 10:20 AM CDT        Medication List   (As Of: 9/19/2019 10:23:40 AM CDT)   Prescription/Discharge Order    cephalexin  :   cephalexin ; Status:   Prescribed ; Ordered As Mnemonic:   cephalexin 500 mg oral capsule ; Simple Display Line:   500 mg, 1 cap(s), Oral, q8 hrs, for 7 day(s), 21 cap(s), 0 Refill(s) ; Ordering Provider:   Negrito Abdul MD; Catalog Code:   cephalexin ; Order Dt/Tm:   9/12/2019 11:05:44 AM          nystatin topical  :   nystatin topical ; Status:   Prescribed ; Ordered As Mnemonic:   nystatin 100,000 units/g topical cream ; Simple Display Line:   1 jonathan, TOP, TID, 30 g, 2 Refill(s) ; Ordering Provider:   Negrito Abdul MD; Catalog Code:   nystatin topical ; Order Dt/Tm:   9/5/2019 9:30:35 AM          atorvastatin  :   atorvastatin ; Status:   Prescribed ; Ordered As Mnemonic:   atorvastatin 80 mg oral tablet ; Simple Display Line:   1 tab(s), Oral, daily, 90 tab(s), 3 Refill(s) ; Ordering Provider:   Richard Conde MD; Catalog Code:   atorvastatin ; Order Dt/Tm:   7/2/2019 2:49:11 PM          metFORMIN  :   metFORMIN ; Status:   Prescribed ; Ordered As Mnemonic:   metFORMIN 500 mg oral tablet ; Simple Display Line:   500 mg, 1 tab(s), PO, BID, 180 tab(s), 3 Refill(s) ; Ordering Provider:   Richard Conde MD; Catalog Code:   metFORMIN ; Order Dt/Tm:   7/2/2019 2:49:11 PM          furosemide  :   furosemide ; Status:   Prescribed  ; Ordered As Mnemonic:   Lasix 40 mg oral tablet ; Simple Display Line:   40 mg, 1 tab(s), PO, Daily, 90 tab(s), 3 Refill(s) ; Ordering Provider:   Richard Conde MD; Catalog Code:   furosemide ; Order Dt/Tm:   7/2/2019 2:49:10 PM          potassium chloride  :   potassium chloride ; Status:   Prescribed ; Ordered As Mnemonic:   potassium chloride 20 mEq oral tablet, extended release ; Simple Display Line:   20 mEq, 1 tab(s), PO, daily, 90 tab(s), 3 Refill(s) ; Ordering Provider:   Richard Conde MD; Catalog Code:   potassium chloride ; Order Dt/Tm:   7/2/2019 2:49:10 PM          oxybutynin  :   oxybutynin ; Status:   Prescribed ; Ordered As Mnemonic:   Ditropan XL 10 mg/24 hr oral tablet, extended release ; Simple Display Line:   10 mg, 1 tab(s), PO, Daily, 90 tab(s), 3 Refill(s) ; Ordering Provider:   Richard Conde MD; Catalog Code:   oxybutynin ; Order Dt/Tm:   7/2/2019 2:49:09 PM          Miscellaneous Rx Supply  :   Miscellaneous Rx Supply ; Status:   Prescribed ; Ordered As Mnemonic:   electric recliner and lift chair ; Simple Display Line:   See Instructions, use for sitting and for elevating legs, 1 EA, 0 Refill(s) ; Ordering Provider:   Richard Conde MD; Catalog Code:   Miscellaneous Rx Supply ; Order Dt/Tm:   5/9/2019 11:15:47 AM          Miscellaneous Rx Supply  :   Miscellaneous Rx Supply ; Status:   Prescribed ; Ordered As Mnemonic:   custom fit diabetic shoes ; Simple Display Line:   See Instructions, wear daily for heel pain, neuropathy, venous insufficiency, 2 EA, 0 Refill(s) ; Ordering Provider:   Richard Conde MD; Catalog Code:   Miscellaneous Rx Supply ; Order Dt/Tm:   9/18/2018 1:29:36 PM          Miscellaneous Prescription  :   Miscellaneous Prescription ; Status:   Prescribed ; Ordered As Mnemonic:   moldable orthotics ; Simple Display Line:   See Instructions, use daily with diabetic shoes, 6 EA, 0 Refill(s) ; Ordering Provider:   Richard Conde MD; Catalog Code:   Miscellaneous  Prescription ; Order Dt/Tm:   9/18/2018 1:29:55 PM          triamcinolone topical  :   triamcinolone topical ; Status:   Prescribed ; Ordered As Mnemonic:   triamcinolone 0.1% topical ointment ; Simple Display Line:   1 jonathan, top, tid, apply a thin film  to affected area  Area needs to be dry, 60 gm, 1 Refill(s) ; Ordering Provider:   Richard Conde MD; Catalog Code:   triamcinolone topical ; Order Dt/Tm:   12/11/2017 9:03:44 AM            Home Meds    naproxen  :   naproxen ; Status:   Documented ; Ordered As Mnemonic:   Aleve 220 mg oral capsule ; Simple Display Line:   220 mg, 1 cap(s), Oral, q12 hrs, 0 Refill(s) ; Catalog Code:   naproxen ; Order Dt/Tm:   3/12/2019 11:51:40 AM          calcium-vitamin D  :   calcium-vitamin D ; Status:   Documented ; Ordered As Mnemonic:   calcium (as carbonate)-vitamin D 600 mg-125 intl units oral tablet ; Simple Display Line:   1 tab(s), Oral, tid, 0 Refill(s) ; Catalog Code:   calcium-vitamin D ; Order Dt/Tm:   3/12/2019 11:50:47 AM          cholecalciferol  :   cholecalciferol ; Status:   Documented ; Ordered As Mnemonic:   Vitamin D3 1000 intl units oral capsule ; Simple Display Line:   1,000 International Unit, 1 cap(s), PO, daily ; Catalog Code:   cholecalciferol ; Order Dt/Tm:   4/16/2012 11:05:56 AM          aspirin  :   aspirin ; Status:   Documented ; Ordered As Mnemonic:   aspirin 81 mg oral tablet ; Simple Display Line:   81 mg, 1 tab(s), PO, Daily, 30 tab(s) ; Catalog Code:   aspirin ; Order Dt/Tm:   3/22/2012 10:42:39 AM          acetaminophen  :   acetaminophen ; Status:   Documented ; Ordered As Mnemonic:   Tylenol ; Simple Display Line:   PO, PRN: as needed for pain ; Catalog Code:   acetaminophen ; Order Dt/Tm:   1/15/2010 1:47:50 PM          multivitamin  :   multivitamin ; Status:   Documented ; Ordered As Mnemonic:   Daily Multiple Vitamins ; Simple Display Line:   PO, Daily ; Catalog Code:   multivitamin ; Order Dt/Tm:   1/15/2010 1:47:00 PM

## 2022-02-15 NOTE — PROGRESS NOTES
Patient:   PAKO BARBER            MRN: [id number]            FIN: [id number]               Age:   78 years     Sex:  Female     :  1940   Associated Diagnoses:   Varicose Veins of Legs; Bilateral leg weakness; Chronic Venous Insufficiency; Peripheral edema   Author:   Richard Conde MD      Chief Complaint   patient here to discuss mobility      History of Present Illness   patient here due to concerns about mobility in her home  lives in a one floor apartment, uses cane for short distances and walker for longer distances  has been noticing increased difficulty getting up from a chair  also has significant lower extremity peripheral edema, needs to elevate legs when sitting  also gets up frequently to use the bathroom due to diuretics, has a hard time getting up out of the chair with full bladder      Health Status   Allergies:    Allergic Reactions (All)  Severity Not Documented  Latex (No reactions were documented)  Opthmolic eye drops (Swelling of eye..)  Vicodin (Behavioral disturbances)  Zocor (Diarrhea)   Medications:  (Selected)   Prescriptions  Prescribed  Ditropan XL 10 mg/24 hr oral tablet, extended release: = 1 tab(s) ( 10 mg ), PO, Daily, # 90 tab(s), 3 Refill(s), Type: Maintenance, Pharmacy: [id number], 1 tab(s) Oral daily  Lasix 40 mg oral tablet: = 1 tab(s) ( 40 mg ), PO, Daily, # 90 tab(s), 3 Refill(s), Type: Maintenance, Pharmacy: [id number], 1 tab(s) Oral daily  atorvastatin 80 mg oral tablet: = 1 tab(s) ( 80 mg ), po, daily, # 90 tab(s), 3 Refill(s), Type: Maintenance, Pharmacy: [id number], 1 tab(s) Oral daily  custom fit diabetic shoes: custom fit diabetic shoes, See Instructions, Instructions: wear daily for heel pain, neuropathy, venous insufficiency, Supply, # 2 EA, 0 Refill(s), Type: Maintenance, Pharmacy: [facility], wear daily for heel pain, neuropathy, venous insufficiency  metFORMIN 500 mg oral tablet: = 1 tab(s) ( 500 mg ), PO, BID, # 180  tab(s), 3 Refill(s), Type: Maintenance, Pharmacy: [id number], 1 tab(s) Oral bid  moldable orthotics: moldable orthotics, See Instructions, Instructions: use daily with diabetic shoes, Supply, # 6 EA, 0 Refill(s), Type: Maintenance, Pharmacy: [facility], use daily with diabetic shoes  potassium chloride 20 mEq oral tablet, extended release: = 1 tab(s) ( 20 mEq ), PO, daily, # 90 tab(s), 3 Refill(s), Type: Maintenance, Pharmacy: [id number], 1 tab(s) Oral daily  triamcinolone 0.1% topical ointment: 1 jonathan, top, tid, Instructions: apply a thin film  to affected area  Area needs to be dry, # 60 gm, 1 Refill(s), Type: Maintenance, Pharmacy: [id number], 1 jonathan top tid,Instr:apply a thin film; to affected area; Area needs to be dry  Documented Medications  Documented  Aleve 220 mg oral capsule: = 1 cap(s) ( 220 mg ), Oral, q12 hrs, 0 Refill(s), Type: Maintenance  Daily Multiple Vitamins: PO, Daily, 0  Tylenol: PO, PRN: as needed for pain, 0 Refill(s), Type: Maintenance  Vitamin D3 1000 intl units oral capsule: 1 cap(s) ( 1,000 International Unit ), PO, daily, 0 Refill(s), Type: Soft Stop  aspirin 81 mg oral tablet: 1 tab(s) ( 81 mg ), PO, Daily, # 30 tab(s), 0 Refill(s), Type: Maintenance  calcium (as carbonate)-vitamin D 600 mg-125 intl units oral tablet: 1 tab(s), Oral, tid, 0 Refill(s), Type: Maintenance   Problem list:    All Problems (Selected)  Varicose Veins of Legs / ICD-9-.9 / Confirmed  Diabetes mellitus, type 2 / SNOMED CT 121711089 / Confirmed  Polio / ICD-9-.90 / Confirmed  Osteoporosis / ICD-9-.00 / Confirmed  Obesity / ICD-9-.00 / Probable  OA (Osteoarthritis) / ICD-9-.90 / Confirmed  Mixed hyperlipidemia / SNOMED CT 262498211 / Confirmed  Metabolic Syndrome / ICD-9-.7 / Confirmed  EDEMA / ICD-9-.3 / Confirmed  Diabetes mellitus with diabetic neuropathy / SNOMED CT 376861612 / Confirmed  CVA, old, cognitive deficits / ICD-9-.0 /  Confirmed  Chronic Venous Insufficiency / ICD-9-.81 / Confirmed  Buttock Pain / ICD-9-.1 / Confirmed  Hyperplastic Colonic Polyp / SNOMED CT 552912853 / Confirmed      Histories   Past Medical History:    Active  Hyperplastic Colonic Polyp (SNOMED CT 098756982): Onset in 2004 at 64 years.  Buttock Pain (ICD-9-.1): Onset in 2004 at 63 years.  Comments:  5/8/2013 CDT 11:24 AM BOBT Michelle Hall  Work-related fall.  Chronic left buttock pain since then.  OA (Osteoarthritis) (ICD-9-.90)  Osteoporosis (ICD-9-.00)  EDEMA (ICD-9-.3)  Metabolic Syndrome (ICD-9-.7)  Polio (ICD-9-.90)  Chronic Venous Insufficiency (ICD-9-.81)  Varicose Veins of Legs (ICD-9-.9)  Resolved  Inpatient stay (SNOMED CT 493007005): Onset on 10/10/2018 at 78 years.  Resolved on 10/13/2018 at 78 years.  Comments:  10/18/2018 CDT 9:42 AM CDT - [facility], [place] - Acute and chronic right-sided weakness.  Fracture of Wrist (ICD-9-.00): Onset in 2004 at 63 years.  Resolved.  Comments:  5/8/2013 CDT 11:22 AM CDT Michelle Hall  Left    5/8/2013 CDT 11:25 AM CDT Michelle Hall  Work-related fall.  Stroke (ICD-9-): Onset on 7/14/2003 at 63 years.  Resolved.  Comments:  5/8/2013 CDT 12:03 PM BOBT Michelle Hall  Acute CVA with right-sided weakness.  Tobacco user (ICD-9-.1):  Resolved.  Comments:  5/8/2013 CDT 11:46 AM Michelle Preston  Patient states that she quit in 1989.   Family History:    Heart disease  Mother  Grandmother (M)  Brother  Bladder cancer..  Mother     Procedure history:    Extracapsular cataract extraction and insertion of intraocular lens (596477121) on 9/15/2016 at 76 Years.  Comments:  10/10/2016 1:01 PM Rachel Morales.  Extracapsular cataract extraction and insertion of intraocular lens (674614171) on 9/1/2016 at 76 Years.  Comments:  9/8/2016 2:37 PM Rachel Morales.  Colonoscopy (321988997) on 6/25/2014 at 74  Years.  Comments:  2014 9:16 AM Demond Mccoy MD  Moderate left diberticulosis. No polyps. Repeat 10 years.  Sclerotherapy of vein of lower limb (1899011599) in  at 70 Years.  Comments:  2013 11:40 AM Michelle Preston  Left leg in 2010.  Right leg in 2010.  [facility].  Laser ablation of long saphenous vein (4477280491) on 8/3/2009 at 69 Years.  Comments:  2013 11:45 AM Michelle Preston  Right great and right accessory saphenous veins.  Fluoroscopically-guided needle placement for injection of ischial bursa on the left. on 10/10/2008 at 68 Years.  Comments:  2013 11:49 AM Michelle Preston  [facility].  Bone density scan (909756639) on 2007 at 67 Years.  Comments:  2013 11:58 AM Michelle Preston  Osteopenia  Fracture of Wrist (814.00) in  at 64 Years.  Comments:  2013 11:54 AM Michelle Preston  Left  Colonoscopy ([id number]) on 2004 at 63 Years.  Comments:  2011 7:35 AM Rachel Morales  Repeat in 10 yrs.    10/4/2010 11:58 AM MARTIN Knight MD, Demond  diverticulosis. single hyperplastic rectal polyp  Bone density scan (666200963) in  at 62 Years.  Arthroscopy of knee (819694602) in  at 60 Years.  Comments:  2013 12:08 PM Michelle Preston  Left knee.  Flexible sigmoidoscopy (90981845) on 1999 at 59 Years.  Arthroscopy of Knee (80.26) in  at 58 Years.   section ([id number]).  Comments:  2013 11:52 AM Michelle Preston  1960's    2010 12:42 PM JEAN Brush Mila   3, Para 3   Social History:        Alcohol Assessment: Denies Alcohol Use            Never      Tobacco Assessment: Past            Past, Cigarettes, 15 per day.  Started age 19 Years.  Stopped age 44 Years.      Substance Abuse Assessment: Denies Substance Abuse      Employment and Education Assessment            Retired      Home and Environment Assessment            Marital status: .  3 children.      Other  Assessment            Marital status,       Physical Examination   Vital Signs   5/9/2019 10:52 AM CDT Temperature Tympanic 96.3 DegF  LOW    Peripheral Pulse Rate 64 bpm    HR Method Manual    Systolic Blood Pressure 112 mmHg    Diastolic Blood Pressure 60 mmHg    Mean Arterial Pressure 77 mmHg    BP Method Manual      Measurements from flowsheet : Measurements   5/9/2019 10:52 AM CDT Height Measured - Standard 64 in    Weight Measured - Standard 190.2 lb    BSA 1.97 m2    Body Mass Index 32.64 kg/m2  HI      General:  Alert and oriented, No acute distress.    Eye:  Pupils are equal, round and reactive to light, Normal conjunctiva.    Respiratory:  Lungs are clear to auscultation.    Cardiovascular:  Normal rate, Regular rhythm, 3+ edema in lower extremities.    Integumentary:  Warm, Dry, Pink.    Neurologic:  Alert, Oriented, difficulty getting up out of chair, cannot get up without assistance or using arms to push up.       Impression and Plan   Diagnosis     Varicose Veins of Legs (CJT26-ZU I83.93).     Bilateral leg weakness (NCJ25-UN R29.898).     Chronic Venous Insufficiency (MYE19-YU I87.2).     Peripheral edema (GZQ55-AA R60.9).     Course:  Progressing as expected.    Orders     Orders   Pharmacy:  electric recliner and lift chair (Prescribe): electric recliner and lift chair, See Instructions, Instructions: use for sitting and for elevating legs, Supply, # 1 EA, 0 Refill(s), Type: Maintenance.

## 2022-02-15 NOTE — TELEPHONE ENCOUNTER
---------------------  From: Eliana Sibley MA (Phone Messages [id number]   To: Jan York PA-C;     Sent: 7/23/2020 1:38:21 PM CDT  Subject: Phone Note: Vascular surgery      PCP:   AFSHAN      Time of Call:  1:30pm       Person Calling:  Pat  Phone number:  [phone]    Returned call at: _    Note:   Patient called stating she has not heard from the doctor AFSHAN wanted her to talk to. She was told to call us if she had not heard anything by this week. Looks like it was supposed to be about a referral to [facility] for Vascular surgery. Please advise on what she should do now.    Pharmacy: n/a    Last office visit and reason:  7/14/20    Transferred to: [facility]---------------------  From: Jan York PA-C   To: Phone [facility] ([id number]);     Sent: 7/24/2020 7:23:36 AM CDT  Subject: RE: Phone Note: Vascular surgery      Have patient call the number in the referral note in chart.    Ramesh Call    Time: 8:08am    Note: Called to let patient know that she can call [facility] at [phone], patient was referred to Dr. Tsai at [place]. Patient will contact them.

## 2022-02-15 NOTE — NURSING NOTE
Comprehensive Intake Entered On:  7/14/2020 5:19 PM CDT    Performed On:  7/14/2020 5:14 PM CDT by Kusum Alexander CMA               Summary   Chief Complaint :   Phone Visit: follow-up ER, leg pain and swelling, water like blisters on legs that have not helped, would like Nystain powder instead of cream, possbile UTI, frequent urintation, odor to urine, verbal consent for visit   Advance Directive :   Yes   Menstrual Status :   Postmenopausal   Height Measured :   64 in(Converted to: 5 ft 4 in, 162.56 cm)    Kusum Alexander CMA - 7/14/2020 5:14 PM CDT   Health Status   Allergies Verified? :   Yes   Medication History Verified? :   Yes   Immunizations Current :   Yes   Medical History Verified? :   Yes   Pre-Visit Planning Status :   Completed   Tobacco Use? :   Former smoker   Kusum Alexander CMA - 7/14/2020 5:14 PM CDT   Consents   Consent for Immunization Exchange :   Consent Granted   Consent for Immunizations to Providers :   Consent Granted   Kusum Alexander CMA - 7/14/2020 5:14 PM CDT   Meds / Allergies   (As Of: 7/14/2020 5:19:47 PM CDT)   Allergies (Active)   Latex  Estimated Onset Date:   Unspecified ; Created By:   Ramila Mayes CMA; Reaction Status:   Active ; Category:   Environment ; Substance:   Latex ; Type:   Allergy ; Updated By:   Ramila Mayes CMA; Reviewed Date:   7/14/2020 5:18 PM CDT      opthmolic eye drops  Estimated Onset Date:   Unspecified ; Reactions:   Swelling of eye.. ; Comments:     Comment 1: Gentamycin opthalmic   ; Created By:   Michelle Zhao; Reaction Status:   Active ; Category:   Drug ; Substance:   opthmolic eye drops ; Type:   Allergy ; Updated By:   Michelle Zhao; Source:   Paper Chart ; Reviewed Date:   7/14/2020 5:18 PM CDT      Vicodin  Estimated Onset Date:   Unspecified ; Reactions:   behavioral disturbances ; Created By:   Elyse Anne RN; Reaction Status:   Active ; Category:   Drug ; Substance:   Vicodin ; Type:   Allergy ; Updated By:   Elyse Anne RN; Reviewed Date:    7/14/2020 5:18 PM CDT      Zocor  Estimated Onset Date:   Unspecified ; Reactions:   Diarrhea ; Created By:   Michelle Zhao; Reaction Status:   Active ; Category:   Drug ; Substance:   Zocor ; Type:   Allergy ; Updated By:   Michelle Zhao; Reviewed Date:   7/14/2020 5:18 PM CDT        Medication List   (As Of: 7/14/2020 5:19:47 PM CDT)   Prescription/Discharge Order    atorvastatin  :   atorvastatin ; Status:   Prescribed ; Ordered As Mnemonic:   atorvastatin 80 mg oral tablet ; Simple Display Line:   1 tab(s), Oral, daily, 90 tab(s), 3 Refill(s) ; Ordering Provider:   Richard Conde MD; Catalog Code:   atorvastatin ; Order Dt/Tm:   12/10/2019 10:31:12 AM CST          azelastine ophthalmic  :   azelastine ophthalmic ; Status:   Prescribed ; Ordered As Mnemonic:   azelastine 0.05% ophthalmic solution ; Simple Display Line:   See Instructions, INSTILL 1 DROP INTO BOTH EYES TWICE A DAY AS NEEDED FOR ALLERGY SYMPTOMS, 18 mL, 2 Refill(s) ; Ordering Provider:   Franko DAMON Long Eddy; Catalog Code:   azelastine ophthalmic ; Order Dt/Tm:   11/12/2019 11:27:28 AM CST          furosemide  :   furosemide ; Status:   Prescribed ; Ordered As Mnemonic:   Lasix 40 mg oral tablet ; Simple Display Line:   40 mg, 1 tab(s), PO, Daily, 90 tab(s), 3 Refill(s) ; Ordering Provider:   Richard Conde MD; Catalog Code:   furosemide ; Order Dt/Tm:   12/10/2019 10:31:13 AM CST          metFORMIN  :   metFORMIN ; Status:   Prescribed ; Ordered As Mnemonic:   metFORMIN 500 mg oral tablet ; Simple Display Line:   500 mg, 1 tab(s), PO, BID, 180 tab(s), 3 Refill(s) ; Ordering Provider:   Richard Conde MD; Catalog Code:   metFORMIN ; Order Dt/Tm:   1/20/2020 10:16:48 AM CST          Miscellaneous Prescription  :   Miscellaneous Prescription ; Status:   Prescribed ; Ordered As Mnemonic:   moldable orthotics ; Simple Display Line:   See Instructions, use daily with diabetic shoes, 6 EA, 0 Refill(s) ; Ordering Provider:   Richard Conde MD;  Catalog Code:   Miscellaneous Prescription ; Order Dt/Tm:   9/18/2018 1:29:55 PM CDT          Miscellaneous Rx Supply  :   Miscellaneous Rx Supply ; Status:   Prescribed ; Ordered As Mnemonic:   custom fit diabetic shoes ; Simple Display Line:   See Instructions, wear daily for heel pain, neuropathy, venous insufficiency, 2 EA, 0 Refill(s) ; Ordering Provider:   Richard Conde MD; Catalog Code:   Miscellaneous Rx Supply ; Order Dt/Tm:   9/18/2018 1:29:36 PM CDT          Miscellaneous Rx Supply  :   Miscellaneous Rx Supply ; Status:   Prescribed ; Ordered As Mnemonic:   electric recliner and lift chair ; Simple Display Line:   See Instructions, use for sitting and for elevating legs, 1 EA, 0 Refill(s) ; Ordering Provider:   Richard Conde MD; Catalog Code:   Miscellaneous Rx Supply ; Order Dt/Tm:   5/9/2019 11:15:47 AM CDT          nystatin topical  :   nystatin topical ; Status:   Prescribed ; Ordered As Mnemonic:   nystatin 100,000 units/g topical cream ; Simple Display Line:   1 jonathan, Topical, tid, 30 gm, 5 Refill(s) ; Ordering Provider:   Richard Conde MD; Catalog Code:   nystatin topical ; Order Dt/Tm:   1/20/2020 10:17:39 AM CST          potassium chloride  :   potassium chloride ; Status:   Prescribed ; Ordered As Mnemonic:   Klor-Con M20 oral tablet, extended release ; Simple Display Line:   20 mEq, 1 tab(s), Oral, bid, 180 tab(s), 3 Refill(s) ; Ordering Provider:   Richard Conde MD; Catalog Code:   potassium chloride ; Order Dt/Tm:   1/20/2020 10:14:30 AM CST            Home Meds    acetaminophen  :   acetaminophen ; Status:   Documented ; Ordered As Mnemonic:   Tylenol ; Simple Display Line:   PO, PRN: as needed for pain ; Catalog Code:   acetaminophen ; Order Dt/Tm:   1/15/2010 1:47:50 PM CST          aspirin  :   aspirin ; Status:   Documented ; Ordered As Mnemonic:   aspirin 81 mg oral tablet ; Simple Display Line:   81 mg, 1 tab(s), PO, Daily, 30 tab(s) ; Catalog Code:   aspirin ; Order Dt/Tm:    3/22/2012 10:42:39 AM CDT          cholecalciferol  :   cholecalciferol ; Status:   Documented ; Ordered As Mnemonic:   Vitamin D3 1000 intl units oral capsule ; Simple Display Line:   1,000 International Unit, 1 cap(s), PO, daily ; Catalog Code:   cholecalciferol ; Order Dt/Tm:   4/16/2012 11:05:56 AM CDT          naproxen  :   naproxen ; Status:   Documented ; Ordered As Mnemonic:   Aleve 220 mg oral capsule ; Simple Display Line:   220 mg, 1 cap(s), Oral, q12 hrs, 0 Refill(s) ; Catalog Code:   naproxen ; Order Dt/Tm:   3/12/2019 11:51:40 AM CDT            ID Risk Screen   Recent Travel History :   No recent travel   Family Member Travel History :   No recent travel   Other Exposure to Infectious Disease :   Unknown   Kusum Alexander CMA - 7/14/2020 5:14 PM CDT

## 2022-02-15 NOTE — TELEPHONE ENCOUNTER
Entered by Negrito Abdul MD on August 09, 2021 3:10:39 PM CDT  ---------------------  From: Negrito Abdul MD   To: [facility]    Sent: 8/9/2021 3:10:39 PM CDT  Subject: Medication Management     ** Submitted: **  Complete:nystatin topical (nystatin 100,000 units/g topical cream)   Signed by Negrito Abdul MD  8/9/2021 8:10:00 PM [facility]    ** Approved **  nystatin topical (nystatin 100,000 unit/gram topical cream)  APPLY TO THE AFFECTED AREA(S) THREE TIMES DAILY  Qty:  30 gm        Days Supply:  15        Refills:  2          Substitutions Allowed     Route To Pharmacy - [facility]   Note from Pharmacy:  This prescription was filled on 8/2/2021. Any refills authorized will be placed on file.              ------------------------------------------  From: [facility]  To: Negrito Abdul MD  Sent: August 9, 2021 8:00:20 AM CDT  Subject: Medication Management  Due: August 7, 2021 10:34:35 AM CDT     ** On Hold Pending Signature **     Dispensed Drug: nystatin topical (nystatin 100,000 units/g topical cream), APPLY TO THE AFFECTED AREA(S) THREE TIMES DAILY  Quantity: 30 gm  Days Supply: 15  Refills: 2  Substitutions Allowed  Notes from Pharmacy: This prescription was filled on 8/2/2021. Any refills authorized will be placed on file.  ------------------------------------------

## 2022-02-15 NOTE — TELEPHONE ENCOUNTER
---------------------  From: Arjun Mayes CMAcie ([facility] ([id number] [facility]))   To: Richard Conde MD;     Sent: 10/23/2019 2:26:19 PM CDT  Subject: FW: Medication Management   Due Date/Time: 10/24/2019 9:37:00 AM CDT     Medication Refill Needing Approval  PCP:   [id number]  Medication:   azelastine ophthalmic  Last Filled:  9/27/19     Quantity:  #6 ml  Refills:  0  CSA on file?   n/a     Date of last office visit and reason:   9/19/19; cellulitis f/u   Date of last labs pertaining to condition:  n/a    Note:  This has been filled twice in the last month. Is this something the pt should be taking long term? Does she need more refills? Please advise.    Return to Clinic order placed?  Due in January for a DM check        ------------------------------------------  From: [facility] [id number] [facility]  To: Negrito Abdul MD  Sent: October 23, 2019 9:37:23 AM CDT  Subject: Medication Management  Due: October 24, 2019 9:37:23 AM CDT    ** On Hold Pending Signature **  Drug: azelastine ophthalmic (azelastine 0.05% ophthalmic solution)  INSTILL 1 DROP INTO BOTH EYES TWICE A DAY AS NEEDED FOR ALLERGY SYMPTOMS  Quantity: 6 mL  Days Supply: 30  Refills: 0  Substitutions Allowed  Notes from Pharmacy:     Dispensed Drug: azelastine ophthalmic (azelastine 0.05% ophthalmic solution)  INSTILL 1 DROP INTO BOTH EYES TWICE A DAY AS NEEDED FOR ALLERGY SYMPTOMS  Quantity: 6 mL  Days Supply: 30  Refills: 0  Substitutions Allowed  Notes from Pharmacy:   ---------------------------------------------------------------  From: Richard Conde MD   To: [facility] [id number] [facility]    Sent: 10/23/2019 3:19:52 PM CDT  Subject: FW: Medication Management     ** Submitted: **  Complete:azelastine ophthalmic (azelastine 0.05% ophthalmic solution)   Signed by Richard Conde MD  10/23/2019 3:19:00 PM    ** Approved with modifications: **  azelastine ophthalmic (AZELASTINE HCL 0.05% DROPS)  INSTILL 1 DROP INTO BOTH EYES TWICE A DAY AS NEEDED FOR ALLERGY  SYMPTOMS  Qty:  6 mL        Days Supply:  30        Refills:  5          Substitutions Allowed     Route To Pharmacy - [facility] [id number] [facility] [employer]   Signed by Richard Conde MD

## 2022-02-15 NOTE — NURSING NOTE
Comprehensive Intake Entered On:  3/12/2019 11:53 AM CDT    Performed On:  3/12/2019 11:47 AM CDT by Ramila Mayes CMA               Summary   Chief Complaint :   c/o swollen right lower leg that is getting worse, states that it is weeping & burning, states that up into her right hip with a tingling feeling; left leg is also getting swollen   Advance Directive :   Yes   Weight Measured :   193 lb(Converted to: 193 lb 0 oz, 87.54 kg)    Height Measured :   64 in(Converted to: 5 ft 4 in, 162.56 cm)    Body Mass Index :   33.12 kg/m2 (HI)    Body Surface Area :   1.99 m2   Systolic Blood Pressure :   118 mmHg   Diastolic Blood Pressure :   66 mmHg   Mean Arterial Pressure :   83 mmHg   Peripheral Pulse Rate :   80 bpm   BP Site :   Right arm   Pulse Site :   Radial artery   BP Method :   Manual   HR Method :   Manual   Temperature Tympanic :   98.6 DegF(Converted to: 37.0 DegC)    Ramila Mayes CMA - 3/12/2019 11:47 AM CDT   Health Status   Allergies Verified? :   Yes   Medication History Verified? :   Yes   Immunizations Current :   Yes   Medical History Verified? :   Yes   Pre-Visit Planning Status :   Completed   Tobacco Use? :   Former smoker   Ramila Mayes CMA - 3/12/2019 11:47 AM CDT   Consents   Consent for Immunization Exchange :   Consent Granted   Consent for Immunizations to Providers :   Consent Granted   Ramila Mayes CMA - 3/12/2019 11:47 AM CDT   Problems   (As Of: 3/12/2019 11:53:57 AM CDT)   Problems(Active)    Buttock Pain (ICD-9-CM  :729.1 )  Name of Problem:   Buttock Pain ; Onset Date:   2004 ; Recorder:   Michelle Zhao; Confirmation:   Confirmed ; Classification:   Medical ; Code:   729.1 ; Contributor System:   PowerChart ; Last Updated:   2/28/2014 6:59 PM CST ; Life Cycle Date:   5/8/2013 ; Life Cycle Status:   Active ; Vocabulary:   ICD-9-CM   ; Comments:        5/8/2013 11:24 AM - Michelle Zhao  Work-related fall.  Chronic left buttock pain since then.      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.0 )  Name of Problem:   CVA, old, cognitive deficits ; Onset Date:   2003 ; Recorder:   Richard Conde MD; Confirmation:   Confirmed ; Classification:   Medical ; Code:   438.0 ; Contributor System:   PowerChart ; Last Updated:   2/28/2014 6:59 PM CST ; Life Cycle Date:   3/22/2012 ; Life Cycle Status:   Active ; Responsible Provider:   Richard Conde MD; Vocabulary:   ICD-9-CM        Diabetes mellitus with diabetic neuropathy (SNOMED CT  :424157917 )  Name of Problem:   Diabetes mellitus with diabetic neuropathy ; Recorder:   Richard Conde MD; Confirmation:   Confirmed ; Classification:   Medical ; Code:   541074352 ; Contributor System:   PowerChart ; Last Updated:   4/13/2018 1:13 PM CDT ; Life Cycle Status:   Active ; Responsible Provider:   Richard Conde MD; Vocabulary:   SNOMED CT        Diabetes mellitus, type 2 (SNOMED CT  :937410537 )  Name of Problem:   Diabetes mellitus, type 2 ; Recorder:   Richard Conde MD; Confirmation:   Confirmed ; Classification:   Medical ; Code:   778290945 ; Contributor System:   PowerChart ; Last Updated:   6/15/2018 11:28 AM CDT ; Life Cycle Status:   Active ; Responsible Provider:   Richard Conde MD; Vocabulary:   SNOMED CT        EDEMA (ICD-9-CM  :782.3 )  Name of Problem:   EDEMA ; Recorder:   Bre Chavez CMA; Confirmation:   Confirmed ; Classification:   Medical ; Code:   782.3 ; Contributor System:   PowerChart ; Last Updated:   2/28/2014 6:59 PM CST ; Life Cycle Date:   1/15/2010 ; Life Cycle Status:   Active ; Vocabulary:    ICD-9-CM        Hyperplastic Colonic Polyp (SNOMED CT  :995732130 )  Name of Problem:   Hyperplastic Colonic Polyp ; Onset Date:   2004 ; Recorder:   Mila Brush; Confirmation:   Confirmed ; Classification:   Medical ; Code:   373759208 ; Contributor System:   PowerChart ; Last Updated:   6/25/2014 9:17 AM CDT ; Life Cycle Date:   11/29/2010 ; Life Cycle Status:   Active ; Vocabulary:   SNOMED CT        Metabolic Syndrome (ICD-9-CM  :277.7 )  Name of Problem:   Metabolic Syndrome ; Recorder:   Bre Chavez CMA; Confirmation:   Confirmed ; Classification:   Medical ; Code:   277.7 ; Contributor System:   PowerChart ; Last Updated:   2/28/2014 6:59 PM CST ; Life Cycle Date:   1/15/2010 ; Life Cycle Status:   Active ; Vocabulary:   ICD-9-CM        Mixed hyperlipidemia (SNOMED CT  :582446241 )  Name of Problem:   Mixed hyperlipidemia ; Recorder:   Richard Conde MD; Confirmation:   Confirmed ; Classification:   Medical ; Code:   547662762 ; Contributor System:   PowerChart ; Last Updated:   11/24/2015 6:27 PM CST ; Life Cycle Status:   Active ; Responsible Provider:   Richard Conde MD; Vocabulary:   SNOMED CT        OA (Osteoarthritis) (ICD-9-CM  :715.90 )  Name of Problem:   OA (Osteoarthritis) ; Recorder:   Bre Chavez CMA; Confirmation:   Confirmed ; Classification:   Medical ; Code:   715.90 ; Contributor System:   PowerChart ; Last Updated:   2/28/2014 6:59 PM CST ; Life Cycle Date:   1/15/2010 ; Life Cycle Status:   Active ; Vocabulary:   ICD-9-CM        Obesity (ICD-9-CM  :278.00 )  Name of Problem:   Obesity ; Recorder:   SYSTEM; Confirmation:   Probable ; Classification:   Medical ; Code:   278.00 ; Last Updated:   2/28/2014 6:59 PM CST ; Life Cycle Date:   8/4/2011 ; Life Cycle Status:   Active ; Vocabulary:   ICD-9-CM        Osteoporosis (ICD-9-CM  :733.00 )  Name of Problem:   Osteoporosis ; Recorder:   Bre Chavez CMA; Confirmation:   Confirmed ; Classification:   Medical ;  Code:   733.00 ; Contributor System:   PowerChart ; Last Updated:   5/8/2013 11:19 AM CDT ; Life Cycle Date:   1/15/2010 ; Life Cycle Status:   Active ; Vocabulary:   ICD-9-CM        Polio (ICD-9-CM  :045.90 )  Name of Problem:   Polio ; Recorder:   Eddie Zamudio MD; Confirmation:   Confirmed ; Classification:   Medical ; Code:   045.90 ; Contributor System:   PowerChart ; Last Updated:   2/28/2014 6:59 PM CST ; Life Cycle Date:   6/23/2010 ; Life Cycle Status:   Active ; Responsible Provider:   Eddie Zamudio MD; Vocabulary:   ICD-9-CM        Varicose Veins of Legs (ICD-9-CM  :454.9 )  Name of Problem:   Varicose Veins of Legs ; Recorder:   Michelle Zhao; Confirmation:   Confirmed ; Classification:   Medical ; Code:   454.9 ; Contributor System:   PowerChart ; Last Updated:   2/28/2014 6:59 PM CST ; Life Cycle Date:   5/8/2013 ; Life Cycle Status:   Active ; Vocabulary:   ICD-9-CM          Meds / Allergies   (As Of: 3/12/2019 11:53:57 AM CDT)   Allergies (Active)   Latex  Estimated Onset Date:   Unspecified ; Created By:   Ramila Mayes CMA; Reaction Status:   Active ; Category:   Environment ; Substance:   Latex ; Type:   Allergy ; Updated By:   Ramila Mayes CMA; Reviewed Date:   3/12/2019 11:49 AM CDT      opthmolic eye drops  Estimated Onset Date:   Unspecified ; Reactions:   Swelling of eye.. ; Comments:     Comment 1: Gentamycin opthalmic   ; Created By:   Michelle Zhao; Reaction Status:   Active ; Category:   Drug ; Substance:   opthmolic eye drops ; Type:   Allergy ; Updated By:   Michelle Zhao; Source:   Paper Chart ; Reviewed Date:   3/12/2019 11:49 AM CDT      Vicodin  Estimated Onset Date:   Unspecified ; Reactions:   behavioral disturbances ; Created By:   Elyse Anne RN; Reaction Status:   Active ; Category:   Drug ; Substance:   Vicodin ; Type:   Allergy ; Updated By:   Elyse Anne RN; Reviewed Date:   3/12/2019 11:49 AM CDT      Zocor  Estimated Onset Date:   Unspecified ; Reactions:   Diarrhea  ; Created By:   Michelle Zhao; Reaction Status:   Active ; Category:   Drug ; Substance:   Zocor ; Type:   Allergy ; Updated By:   Michelle Zhao; Reviewed Date:   3/12/2019 11:49 AM CDT        Medication List   (As Of: 3/12/2019 11:53:57 AM CDT)   Prescription/Discharge Order    atorvastatin  :   atorvastatin ; Status:   Prescribed ; Ordered As Mnemonic:   atorvastatin 80 mg oral tablet ; Simple Display Line:   80 mg, 1 tab(s), po, daily, 90 tab(s), 3 Refill(s) ; Ordering Provider:   Richard Conde MD; Catalog Code:   atorvastatin ; Order Dt/Tm:   9/18/2018 1:25:04 PM          furosemide  :   furosemide ; Status:   Prescribed ; Ordered As Mnemonic:   Lasix 40 mg oral tablet ; Simple Display Line:   40 mg, 1 tab(s), PO, Daily, 90 tab(s), 3 Refill(s) ; Ordering Provider:   Richard Conde MD; Catalog Code:   furosemide ; Order Dt/Tm:   9/18/2018 1:25:05 PM          metFORMIN  :   metFORMIN ; Status:   Prescribed ; Ordered As Mnemonic:   metFORMIN 500 mg oral tablet ; Simple Display Line:   500 mg, 1 tab(s), PO, BID, 180 tab(s), 3 Refill(s) ; Ordering Provider:   Richard Conde MD; Catalog Code:   metFORMIN ; Order Dt/Tm:   9/18/2018 1:25:08 PM          Miscellaneous Prescription  :   Miscellaneous Prescription ; Status:   Prescribed ; Ordered As Mnemonic:   moldable orthotics ; Simple Display Line:   See Instructions, use daily with diabetic shoes, 6 EA, 0 Refill(s) ; Ordering Provider:   Richard Conde MD; Catalog Code:   Miscellaneous Prescription ; Order Dt/Tm:   9/18/2018 1:29:55 PM          Miscellaneous Rx Supply  :   Miscellaneous Rx Supply ; Status:   Prescribed ; Ordered As Mnemonic:   custom fit diabetic shoes ; Simple Display Line:   See Instructions, wear daily for heel pain, neuropathy, venous insufficiency, 2 EA, 0 Refill(s) ; Ordering Provider:   Richard Conde MD; Catalog Code:   Miscellaneous Rx Supply ; Order Dt/Tm:   9/18/2018 1:29:36 PM          oxybutynin  :   oxybutynin ; Status:    Prescribed ; Ordered As Mnemonic:   Ditropan XL 10 mg/24 hr oral tablet, extended release ; Simple Display Line:   10 mg, 1 tab(s), PO, Daily, 90 tab(s), 3 Refill(s) ; Ordering Provider:   Richard Conde MD; Catalog Code:   oxybutynin ; Order Dt/Tm:   9/18/2018 1:25:02 PM          potassium chloride  :   potassium chloride ; Status:   Prescribed ; Ordered As Mnemonic:   potassium chloride 20 mEq oral tablet, extended release ; Simple Display Line:   20 mEq, 1 tab(s), PO, daily, 90 tab(s), 3 Refill(s) ; Ordering Provider:   Richard Conde MD; Catalog Code:   potassium chloride ; Order Dt/Tm:   9/18/2018 1:25:07 PM          triamcinolone topical  :   triamcinolone topical ; Status:   Prescribed ; Ordered As Mnemonic:   triamcinolone 0.1% topical ointment ; Simple Display Line:   1 jonathan, top, tid, apply a thin film  to affected area  Area needs to be dry, 60 gm, 1 Refill(s) ; Ordering Provider:   Richard Conde MD; Catalog Code:   triamcinolone topical ; Order Dt/Tm:   12/11/2017 9:03:44 AM            Home Meds    calcium-vitamin D  :   calcium-vitamin D ; Status:   Documented ; Ordered As Mnemonic:   calcium (as carbonate)-vitamin D 600 mg-125 intl units oral tablet ; Simple Display Line:   1 tab(s), Oral, tid, 0 Refill(s) ; Catalog Code:   calcium-vitamin D ; Order Dt/Tm:   3/12/2019 11:50:47 AM          naproxen  :   naproxen ; Status:   Documented ; Ordered As Mnemonic:   Aleve 220 mg oral capsule ; Simple Display Line:   220 mg, 1 cap(s), Oral, q12 hrs, 0 Refill(s) ; Catalog Code:   naproxen ; Order Dt/Tm:   3/12/2019 11:51:40 AM          acetaminophen  :   acetaminophen ; Status:   Documented ; Ordered As Mnemonic:   Tylenol ; Simple Display Line:   PO, PRN: as needed for pain ; Catalog Code:   acetaminophen ; Order Dt/Tm:   1/15/2010 1:47:50 PM          aspirin  :   aspirin ; Status:   Documented ; Ordered As Mnemonic:   aspirin 81 mg oral tablet ; Simple Display Line:   81 mg, 1 tab(s), PO, Daily, 30  tab(s) ; Catalog Code:   aspirin ; Order Dt/Tm:   3/22/2012 10:42:39 AM          cholecalciferol  :   cholecalciferol ; Status:   Documented ; Ordered As Mnemonic:   Vitamin D3 1000 intl units oral capsule ; Simple Display Line:   1,000 International Unit, 1 cap(s), PO, daily ; Catalog Code:   cholecalciferol ; Order Dt/Tm:   4/16/2012 11:05:56 AM          multivitamin  :   multivitamin ; Status:   Documented ; Ordered As Mnemonic:   Daily Multiple Vitamins ; Simple Display Line:   PO, Daily ; Catalog Code:   multivitamin ; Order Dt/Tm:   1/15/2010 1:47:00 PM

## 2022-02-15 NOTE — TELEPHONE ENCOUNTER
---------------------  From: Jass Lacey LPN (eRx Pool ([id number]_[place]))   To: AFSHAN Message Pool ([id number]_[facility]);     Sent: 6/17/2021 3:11:41 PM CDT  Subject: FW: Medication Management   Due Date/Time: 6/18/2021 9:22:00 AM CDT     Medication Refill    PCP:   Shirley CAVANAUGH    Name of med requested:  _ DIABETIC SHOES    Date of last office visit and reason:  _ 12-9-2020 PAIN      Date of last Med Check / Px:   _ 1-2020    Date of last labs pertaining to med:  _    RTC order in chart:  _ NO    For Protocol refill, has patient been contacted:  _          ** Patient matched by Jass Lacey LPN on 6/17/2021 3:04:19 PM CDT **      ------------------------------------------  From: [employer]  To: Richard Conde MD  Sent: June 17, 2021 9:22:09 AM CDT  Subject: Medication Management  Due: June 4, 2021 8:26:15 PM CDT     ** On Hold Pending Signature **     Drug: DIABETIC SHOES, APPLY ONCE DAILY HEEL PAIN NEUROPATHY VENOUS INSUFFICIENCY -HANDBILL PER JOURDAN  Quantity: 2 EA  Days Supply: 2  Refills: 0  Substitutions Allowed  Notes from Pharmacy:     Dispensed Drug: DIABETIC SHOES, APPLY ONCE DAILY HEEL PAIN NEUROPATHY VENOUS INSUFFICIENCY -HANDBILL PER JOURDAN  Quantity: 2 EA  Days Supply: 2  Refills: 0  Substitutions Allowed  Notes from Pharmacy:  ------------------------------------------needs visit---------------------  From: Rocío Lyles CMA   To: [email or web address]    Sent: 6/22/2021 9:33:24 AM CDT  Subject: FW: Medication Management     ** Not Approved: Patient needs appointment **  Miscellaneous Prescription (DIABETIC SHOES)  APPLY ONCE DAILY HEEL PAIN  NEUROPATHY  VENOUS INSUFFICIENCY -HANDBILL PER JOURDAN  Qty:  2 EA        Days Supply:  2        Refills:  0          Substitutions Allowed     Route To Pharmacy - [facility]   Signed by Rocío Lyles CMA

## 2022-02-15 NOTE — NURSING NOTE
Comprehensive Intake Entered On:  7/2/2019 2:39 PM CDT    Performed On:  7/2/2019 2:29 PM CDT by Eliana Sibley MA               Summary   Chief Complaint :   DM med check; No feeling in left foot, normal in right foot    Advance Directive :   Yes   Weight Measured :   194.4 lb(Converted to: 194 lb 6 oz, 88.18 kg)    Height Measured :   64 in(Converted to: 5 ft 4 in, 162.56 cm)    Body Mass Index :   33.37 kg/m2 (HI)    Body Surface Area :   1.99 m2   Systolic Blood Pressure :   114 mmHg   Diastolic Blood Pressure :   64 mmHg   Mean Arterial Pressure :   81 mmHg   Peripheral Pulse Rate :   72 bpm   BP Site :   Left arm   Pulse Site :   Radial artery   BP Method :   Manual   HR Method :   Manual   Temperature Tympanic :   99.0 DegF(Converted to: 37.2 DegC)    Eliana Sibley MA - 7/2/2019 2:29 PM CDT   Health Status   Allergies Verified? :   Yes   Medication History Verified? :   Yes   Immunizations Current :   Yes   Medical History Verified? :   Yes   Pre-Visit Planning Status :   Completed   Tobacco Use? :   Former smoker   Eliana Sibley MA - 7/2/2019 2:29 PM CDT   Consents   Consent for Immunization Exchange :   Consent Granted   Consent for Immunizations to Providers :   Consent Granted   Eliana Sibley MA - 7/2/2019 2:29 PM CDT   Meds / Allergies   (As Of: 7/2/2019 2:39:54 PM CDT)   Allergies (Active)   Latex  Estimated Onset Date:   Unspecified ; Created By:   Ramila Mayes CMA; Reaction Status:   Active ; Category:   Environment ; Substance:   Latex ; Type:   Allergy ; Updated By:   Ramila Mayes CMA; Reviewed Date:   7/2/2019 2:33 PM CDT      opthmolic eye drops  Estimated Onset Date:   Unspecified ; Reactions:   Swelling of eye.. ; Comments:     Comment 1: Gentamycin opthalmic   ; Created By:   Michelle Zhao; Reaction Status:   Active ; Category:   Drug ; Substance:   opthmolic eye drops ; Type:   Allergy ; Updated By:   Michelle Zhao; Source:   Paper Chart ; Reviewed Date:   7/2/2019 2:33 PM CDT      Vicodin   Estimated Onset Date:   Unspecified ; Reactions:   behavioral disturbances ; Created By:   Elyse Anne RN; Reaction Status:   Active ; Category:   Drug ; Substance:   Vicodin ; Type:   Allergy ; Updated By:   Elyse Anne RN; Reviewed Date:   7/2/2019 2:33 PM CDT      Zocor  Estimated Onset Date:   Unspecified ; Reactions:   Diarrhea ; Created By:   Michelle Zhao; Reaction Status:   Active ; Category:   Drug ; Substance:   Zocor ; Type:   Allergy ; Updated By:   Michelle Zhao; Reviewed Date:   7/2/2019 2:33 PM CDT        Medication List   (As Of: 7/2/2019 2:39:54 PM CDT)   Prescription/Discharge Order    atorvastatin  :   atorvastatin ; Status:   Prescribed ; Ordered As Mnemonic:   atorvastatin 80 mg oral tablet ; Simple Display Line:   1 tab(s), Oral, daily, 30 tab(s), 0 Refill(s) ; Ordering Provider:   Richard Conde MD; Catalog Code:   atorvastatin ; Order Dt/Tm:   6/24/2019 10:46:08 AM          Miscellaneous Rx Supply  :   Miscellaneous Rx Supply ; Status:   Prescribed ; Ordered As Mnemonic:   electric recliner and lift chair ; Simple Display Line:   See Instructions, use for sitting and for elevating legs, 1 EA, 0 Refill(s) ; Ordering Provider:   Richard Conde MD; Catalog Code:   Miscellaneous Rx Supply ; Order Dt/Tm:   5/9/2019 11:15:47 AM          Miscellaneous Rx Supply  :   Miscellaneous Rx Supply ; Status:   Prescribed ; Ordered As Mnemonic:   custom fit diabetic shoes ; Simple Display Line:   See Instructions, wear daily for heel pain, neuropathy, venous insufficiency, 2 EA, 0 Refill(s) ; Ordering Provider:   Richard Conde MD; Catalog Code:   Miscellaneous Rx Supply ; Order Dt/Tm:   9/18/2018 1:29:36 PM          Miscellaneous Prescription  :   Miscellaneous Prescription ; Status:   Prescribed ; Ordered As Mnemonic:   moldable orthotics ; Simple Display Line:   See Instructions, use daily with diabetic shoes, 6 EA, 0 Refill(s) ; Ordering Provider:   Richard Conde MD; Catalog Code:    Miscellaneous Prescription ; Order Dt/Tm:   9/18/2018 1:29:55 PM          metFORMIN  :   metFORMIN ; Status:   Prescribed ; Ordered As Mnemonic:   metFORMIN 500 mg oral tablet ; Simple Display Line:   500 mg, 1 tab(s), PO, BID, 180 tab(s), 3 Refill(s) ; Ordering Provider:   Richard Conde MD; Catalog Code:   metFORMIN ; Order Dt/Tm:   9/18/2018 1:25:08 PM          potassium chloride  :   potassium chloride ; Status:   Prescribed ; Ordered As Mnemonic:   potassium chloride 20 mEq oral tablet, extended release ; Simple Display Line:   20 mEq, 1 tab(s), PO, daily, 90 tab(s), 3 Refill(s) ; Ordering Provider:   Richard Conde MD; Catalog Code:   potassium chloride ; Order Dt/Tm:   9/18/2018 1:25:07 PM          furosemide  :   furosemide ; Status:   Prescribed ; Ordered As Mnemonic:   Lasix 40 mg oral tablet ; Simple Display Line:   40 mg, 1 tab(s), PO, Daily, 90 tab(s), 3 Refill(s) ; Ordering Provider:   Richard Conde MD; Catalog Code:   furosemide ; Order Dt/Tm:   9/18/2018 1:25:05 PM          oxybutynin  :   oxybutynin ; Status:   Prescribed ; Ordered As Mnemonic:   Ditropan XL 10 mg/24 hr oral tablet, extended release ; Simple Display Line:   10 mg, 1 tab(s), PO, Daily, 90 tab(s), 3 Refill(s) ; Ordering Provider:   Richard Conde MD; Catalog Code:   oxybutynin ; Order Dt/Tm:   9/18/2018 1:25:02 PM          triamcinolone topical  :   triamcinolone topical ; Status:   Prescribed ; Ordered As Mnemonic:   triamcinolone 0.1% topical ointment ; Simple Display Line:   1 jonathan, top, tid, apply a thin film  to affected area  Area needs to be dry, 60 gm, 1 Refill(s) ; Ordering Provider:   Richard Conde MD; Catalog Code:   triamcinolone topical ; Order Dt/Tm:   12/11/2017 9:03:44 AM            Home Meds    naproxen  :   naproxen ; Status:   Documented ; Ordered As Mnemonic:   Aleve 220 mg oral capsule ; Simple Display Line:   220 mg, 1 cap(s), Oral, q12 hrs, 0 Refill(s) ; Catalog Code:   naproxen ; Order Dt/Tm:    3/12/2019 11:51:40 AM          calcium-vitamin D  :   calcium-vitamin D ; Status:   Documented ; Ordered As Mnemonic:   calcium (as carbonate)-vitamin D 600 mg-125 intl units oral tablet ; Simple Display Line:   1 tab(s), Oral, tid, 0 Refill(s) ; Catalog Code:   calcium-vitamin D ; Order Dt/Tm:   3/12/2019 11:50:47 AM          cholecalciferol  :   cholecalciferol ; Status:   Documented ; Ordered As Mnemonic:   Vitamin D3 1000 intl units oral capsule ; Simple Display Line:   1,000 International Unit, 1 cap(s), PO, daily ; Catalog Code:   cholecalciferol ; Order Dt/Tm:   4/16/2012 11:05:56 AM          aspirin  :   aspirin ; Status:   Documented ; Ordered As Mnemonic:   aspirin 81 mg oral tablet ; Simple Display Line:   81 mg, 1 tab(s), PO, Daily, 30 tab(s) ; Catalog Code:   aspirin ; Order Dt/Tm:   3/22/2012 10:42:39 AM          acetaminophen  :   acetaminophen ; Status:   Documented ; Ordered As Mnemonic:   Tylenol ; Simple Display Line:   PO, PRN: as needed for pain ; Catalog Code:   acetaminophen ; Order Dt/Tm:   1/15/2010 1:47:50 PM          multivitamin  :   multivitamin ; Status:   Documented ; Ordered As Mnemonic:   Daily Multiple Vitamins ; Simple Display Line:   PO, Daily ; Catalog Code:   multivitamin ; Order Dt/Tm:   1/15/2010 1:47:00 PM

## 2022-02-15 NOTE — PROGRESS NOTES
Chief Complaint    follow up fall and shoulder pain  History of Present Illness      patient with multiple concerns today      had a fall 10/15/20 on uneven surface, fell onto right shoulder      shoulder is not improving with time      whole shoulder continues to hurt      had xrays in the ER that were normal      has been in a sling      decreased range of motion noted      notes that she does not want to consider surgery, would be willing to consider physical therapy      patient also notes ongoing bladder issues      has been having issues with leaking and incontinence      notes that she has urinary urgency, no hematuria, no abdominal pain      worries about UTI      has not noticed dysuria      also has been having issues with loose stools and irregular stools      has tried adjusting diet without improvement      eats lots of fruits and vegetables and fish      does not have normal formed stools      no blood in stools, no melena      has been going on for several months      patient overdue for diabetes labs      not checking blood sugars regularly      no concerns with medications      on statin      patient with rash under right breast, has been using nystatin cream past 2 days  Review of Systems      Constitutional: negative except per HPI      Respiratory: no shortness of breath or cough      Cardiovascular: peripheral edema ongoing      Gastrointestinal: negative except per HPI      Genitourinary: negative except per HPI  Physical Exam   Vitals & Measurements    T: 97.9  F (Tympanic)  HR: 72 (Peripheral)  BP: 134/78  SpO2: 96%     HT: 64 in       patient is alert and oriented, cooperative, in no distress      eyes: PERRL, normal conjunctiva      heent: normocephalic, oral mucosa moist      neck: supple, no lymphadenopathy, no thyromegaly      CV: RRR, no murmur, 3+ edema lower extremities      lungs: clear and equal bilaterally, no rales, rhonchi, or wheezes       no CVA tenderness          Assessment/Plan       1. Urinary frequency (R35.0)         reviewed UA result which are normal        suspect patient has stress incontinence        consider urology consult but patient prefers not to pursue at this time        will trial timed voiding to prevent overflow of urine         Ordered:          POC URINALYSIS, UA* ([facility]), Specimen Type: Urine, Collection Date: 10/28/20 12:33:00 CDT                2. Right shoulder pain (M25.511)         concerning for rotator cuff injury        however, patient is poor surgical candidate and does not want to consider MRI at this time        would like to try physical therapy        will follow up if not effective or if new concerns         Ordered:          Physical Therapy Evaluation and Treatment (Request), Right shoulder pain                3. Diabetes mellitus, type 2 (E11.9)         overdue for labs        will check lipid and A1c        continue metformin and statin         Ordered:          CBC (h/h, RBC, indices, WBC, Plt)* ([facility]), Specimen Type: Blood, Collection Date: 10/28/20 12:37:00 CDT          Comprehensive Metabolic Panel* ([facility]), Specimen Type: Serum, Collection Date: 10/28/20 12:37:00 CDT          Hemoglobin A1c* ([facility]), Specimen Type: Blood, Collection Date: 10/28/20 12:37:00 CDT          Lipid panel with reflex to direct ldl* ([facility]), Specimen Type: Serum, Collection Date: 10/28/20 12:37:00 CDT          Return to Clinic (Request), RFV: 6 mo chronic disease f/u, Return in 6 mo          TSH* ([facility]), Specimen Type: Serum, Collection Date: 10/28/20 12:37:00 CDT                4. Chronic Venous Insufficiency (I87.2)         peripheral edema is ongoing significant issue        will refer to physical therapy for evaluation and to consider options for compression therapy         Ordered:          CBC (h/h, RBC, indices, WBC, Plt)* ([facility]), Specimen Type: Blood, Collection Date: 10/28/20 12:37:00 CDT          Comprehensive Metabolic Panel* ([facility]),  Specimen Type: Serum, Collection Date: 10/28/20 12:37:00 CDT          Hemoglobin A1c* ([facility]), Specimen Type: Blood, Collection Date: 10/28/20 12:37:00 CDT          Return to Clinic (Request), RFV: 6 mo chronic disease f/u, Return in 6 mo          TSH* ([facility]), Specimen Type: Serum, Collection Date: 10/28/20 12:37:00 CDT                5. EDEMA (R60.9)         secondary to venous insufficiency, PT consult pending as noted         Ordered:          Physical Therapy Evaluation and Treatment (Request), Instructions: Bilateral lower extremity edema management/alt. compression stockings vs wraps, EDEMA  Varicose Veins of Legs  Chronic Venous Insufficiency                6. Loose stools (R19.5)         will check labs        may be related to metformin use        await results of blood work         Ordered:          TSH* ([facility]), Specimen Type: Serum, Collection Date: 10/28/20 12:37:00 CDT                Orders:  Patient Information     Name:PAKO BARBER      Address:      [address] [place] [phone]     Sex:Female     YOB: 1940     [phone]     Emergency Contact:ASIF BERNAL     MRN:[id number]     FIN:[id number]     Location[facility]     Date of Service:10/28/2020      Primary Care Physician:       Richard Conde MD, [phone]      Attending Physician:       Richard Conde MD, [phone]  Problem List/Past Medical History    Ongoing     Chronic Venous Insufficiency     CVA, old, cognitive deficits     Diabetes mellitus with diabetic neuropathy     Diabetes mellitus, type 2     EDEMA     Hyperplastic Colonic Polyp     Ischial Bursitis       Comments: Left.     Metabolic Syndrome     Mixed hyperlipidemia     OA (Osteoarthritis)     Obesity     Osteoporosis     Polio     Tear of Medial Meniscus of Knee       Comments: Left knee.     Urge incontinence     Varicose Veins of Legs    Historical     Buttock Pain       Comments:  Work-related fall. Chronic left buttock pain since then.     Fracture of Wrist       Comments: Work-related fall. Left     Inpatient stay       Comments: [place], [place] - Acute and chronic right-sided weakness.     Stroke       Comments: Acute CVA with right-sided weakness.     Tobacco user       Comments: Patient states that she quit in .  Procedure/Surgical History     Extracapsular cataract extraction and insertion of intraocular lens (09/15/2016)            Comments: Right..     Extracapsular cataract extraction and insertion of intraocular lens (2016)            Comments: Left..     Colonoscopy (2014)            Comments: Moderate left diberticulosis. No polyps. Repeat 10 years..     Sclerotherapy of vein of lower limb ()            Comments: Left leg in 2010.  Right leg in 2010.  [facility]..     Laser ablation of long saphenous vein (2009)            Comments: Right great and right accessory saphenous veins..     Fluoroscopically-guided needle placement for injection of ischial bursa on the left. (10/10/2008)            Comments: [facility]..     Bone density scan (2007)            Comments: Osteopenia.     Colonoscopy (2004)            Comments: Repeat in 10 yrs.. diverticulosis. single hyperplastic rectal polyp.     Fracture of Wrist ()            Comments: Left.     Bone density scan ()           Arthroscopy of knee ()            Comments: Left knee..     Flexible sigmoidoscopy (1999)           Arthroscopy of Knee ()            section            Comments:  3, Para 3. 1960's.  Medications    Aleve 220 mg oral capsule, 220 mg= 1 cap(s), Oral, q12 hrs    aspirin 81 mg oral delayed release tablet, 81 mg= 1 tab(s), Oral, daily    atorvastatin 80 mg oral tablet, 1 tab(s), Oral, daily, 3 refills    azelastine 0.05% ophthalmic solution, See Instructions    custom fit diabetic shoes, See Instructions     Ditropan XL 10 mg/24 hr oral tablet, extended release, 10 mg= 1 tab(s), Oral, daily, 3 refills    electric recliner and lift chair, See Instructions    Klor-Con M20 oral tablet, extended release, 20 mEq= 1 tab(s), Oral, bid, 3 refills    Lasix 40 mg oral tablet, 40 mg= 1 tab(s), Oral, daily, 3 refills    metFORMIN 500 mg oral tablet, 500 mg= 1 tab(s), Oral, bid, 3 refills    Moldable inserts for diabetic shoes, See Instructions    moldable orthotics, See Instructions    nystatin 100,000 units/g topical cream, See Instructions    Tylenol, Oral, PRN    Vitamin D3 1000 intl units oral capsule, 1000 International Unit= 1 cap(s), Oral, daily  Allergies    Latex    Vicodin (behavioral disturbances)    Zocor (Diarrhea)    opthmolic eye drops (Swelling of eye..)  Social History    Smoking Status     Former smoker     Alcohol - Denies Alcohol Use      Never     Employment/School      Retired     Home/Environment      Marital status: . 3 children.     Other      Marital status,      Substance Abuse - Denies Substance Abuse     Tobacco - Past      Past, Cigarettes, 15 per day. Started age 19 Years. Stopped age 44 Years.  Family History    Bladder cancer..: Mother.    Heart disease: Mother, Brother and Grandmother (M).  Immunizations      Vaccine Date Status          influenza virus vaccine, inactivated 09/16/2020 Recorded          influenza virus vaccine, inactivated 09/18/2018 Given          influenza virus vaccine, inactivated 09/12/2017 Given          influenza virus vaccine, inactivated 09/26/2016 Given          influenza virus vaccine, inactivated 11/24/2015 Given          pneumococcal (PCV13) 04/22/2015 Recorded              Comments : sky Rogel 02/05/2015 Recorded              Comments : [2/6/2015] Given @ [id number]          influenza virus vaccine, inactivated 10/02/2014 Given          influenza virus vaccine, inactivated 11/05/2013 Given          influenza virus vaccine,  inactivated 12/19/2012 Given          tetanus 03/22/2012 Given          Td 03/22/2012 Recorded          influenza 12/12/2011 Recorded          influenza virus vaccine, inactivated 10/04/2010 Given          pneumococcal (PPSV23) 01/01/2009 Recorded          influenza 09/26/2007 Recorded          Td 10/10/1994 Recorded  Lab Results       Lab Results (Last 4 results within 90 days)        UA pH: < OR = 5.0 [5  - 8] (10/28/20 12:55:00)       UA Specific Gravity: 1.02 [1.001  - 1.035] (10/28/20 12:55:00)       UA Glucose: NEGATIVE [NEGATIVE  - NEGATIVE] (10/28/20 12:55:00)       UA Bilirubin: NEGATIVE [NEGATIVE  - NEGATIVE] (10/28/20 12:55:00)       UA Ketones: NEGATIVE [NEGATIVE  - NEGATIVE] (10/28/20 12:55:00)       Urine Occult Blood: TRACE Abnormal [NEGATIVE  - NEGATIVE] (10/28/20 12:55:00)       UA Protein: NEGATIVE [NEGATIVE  - NEGATIVE] (10/28/20 12:55:00)       UA Nitrite: NEGATIVE [NEGATIVE  - NEGATIVE] (10/28/20 12:55:00)       UA Leukocyte Esterase: NEGATIVE [NEGATIVE  - NEGATIVE] (10/28/20 12:55:00)       UA Epithelial Cells: Few [None  - Few] (10/28/20 13:12:00)       UA WBC: 0-2 [None  - 5] (10/28/20 13:12:00)       UA RBC: 0-2 [None  - 2] (10/28/20 13:12:00)       UA Bacteria: Rare [None  - Few] (10/28/20 13:12:00)

## 2022-02-15 NOTE — TELEPHONE ENCOUNTER
Entered by Kusum Alexander CMA on June 24, 2019 10:46:35 AM CDT  ---------------------  From: Kusum Alexander CMA   To: [facility] [id number] [facility]    Sent: 6/24/2019 10:46:35 AM CDT  Subject: Medication Management     ** Submitted: **  Order:atorvastatin (atorvastatin 80 mg oral tablet)  1 tab(s)  Oral  daily  Qty:  30 tab(s)        Refills:  0          Substitutions Allowed     Route To Pharmacy - [place] [address] [facility]    Signed by Kusum Alexander CMA  6/24/2019 10:46:00 AM    ** Submitted: **  Complete:atorvastatin (atorvastatin 80 mg oral tablet)   Signed by Kusum Alexander CMA  6/24/2019 10:46:00 AM    ** Not Approved:  **  atorvastatin (ATORVASTATIN 80 MG TABLET)  TAKE 1 TABLET BY MOUTH EVERY DAY  Qty:  180 tab(s)        Days Supply:  90        Refills:  0          Substitutions Allowed     Route To Pharmacy - [place] [address] [facility]   Signed by Kusum Alexander CMA            ** Patient matched by Kusum Alexander CMA on 6/24/2019 10:39:43 AM CDT **      ------------------------------------------  From: [facility] [id number] [facility]  To: Richard Conde MD  Sent: June 24, 2019 1:25:21 AM CDT  Subject: Medication Management  Due: June 25, 2019 1:25:21 AM CDT    ** On Hold Pending Signature **  Drug: atorvastatin (Lipitor 80 mg oral tablet)  TAKE 1 TABLET BY MOUTH EVERY DAY  Quantity: 180 tab(s)    Days Supply: 90        Refills: 0  Substitutions Allowed  Notes from Pharmacy:     Dispensed Drug: atorvastatin (atorvastatin 80 mg oral tablet)  TAKE 1 TABLET BY MOUTH EVERY DAY  Quantity: 180 tab(s)    Days Supply: 90        Refills: 0  Substitutions Allowed  Notes from Pharmacy:   ------------------------------------------Date of last office visit and reason:  5/9/19 edema/weakness      Date of last Med Check / Px:     Date of last labs pertaining to med:  6/6/18    RTC order in chart:  yes due now    For Protocol refill, has patient been contacted:  yes called to let patient know that she is due for an appt. labs 6/26/19 and [facility] 7/2/19

## 2022-02-15 NOTE — PROGRESS NOTES
Patient:   PAKO BARBER            MRN: [id number]            FIN: [id number]               Age:   79 years     Sex:  Female     :  1940   Associated Diagnoses:   Peripheral edema   Author:   Jan York PA-C      Report Summary   DiagnosisPeripheral edema (VYA12-SR R60.9)Patient Instructions   Visit Information      Date of Service: 2019 01:26 pm  Performing Location: [facility]  Encounter#: [id number]   Visit type:  General concerns.    Accompanied by:  No one.    Source of history:  Self.    Referral source:  Self.    History limitation:  None.       Chief Complaint   2019 1:29 PM CDT    fell about 4 days ago, legs have water blisters, bandages were pink in color, daughter would like a check up        History of Present Illness             The patient presents with peripheral edema.  The location of peripheral edema is bilateral, the lower extremity.  The peripheral edema is characterized by skin red in color and shiny skin appearance.  The severity of the peripheral edema is moderate.  The peripheral edema is constant.  Chronic LE edema. Tripped over clothes basket 4 nights ago and fell. No LOC. Daughter was upset that she didn't come in. Patient states she is fine. No neck pain. No HA. No extremity pain. CC above noted and confirmed with the patient..        Review of Systems   Constitutional:  Negative.    Musculoskeletal:  Negative.    Integumentary:  Negative except as documented in history of present illness.       Health Status   Allergies:    Allergic Reactions (All)  Severity Not Documented  Latex (No reactions were documented)  Opthmolic eye drops (Swelling of eye..)  Vicodin (Behavioral disturbances)  Zocor (Diarrhea)   Medications:  (Selected)   Prescriptions  Prescribed  Ditropan XL 10 mg/24 hr oral tablet, extended release: = 1 tab(s) ( 10 mg ), PO, Daily, # 90 tab(s), 3 Refill(s), Type: Maintenance, Pharmacy: [place] [id number] [facility], 1 tab(s) Oral daily  Lasix 40 mg  oral tablet: = 1 tab(s) ( 40 mg ), PO, Daily, # 90 tab(s), 3 Refill(s), Type: Maintenance, Pharmacy: [facility] [id number] [facility], 1 tab(s) Oral daily  atorvastatin 80 mg oral tablet: = 1 tab(s), Oral, daily, # 90 tab(s), 3 Refill(s), Type: Maintenance, Pharmacy: [place] [id number] [facility], 1 tab(s) Oral daily  custom fit diabetic shoes: custom fit diabetic shoes, See Instructions, Instructions: wear daily for heel pain, neuropathy, venous insufficiency, Supply, # 2 EA, 0 Refill(s), Type: Maintenance, Pharmacy: [facility], wear daily for heel pain, neuropathy, venous insufficiency  electric recliner and lift chair: electric recliner and lift chair, See Instructions, Instructions: use for sitting and for elevating legs, Supply, # 1 EA, 0 Refill(s), Type: Maintenance  metFORMIN 500 mg oral tablet: = 1 tab(s) ( 500 mg ), PO, BID, # 180 tab(s), 3 Refill(s), Type: Maintenance, Pharmacy: [facility] [id number] [facility], 1 tab(s) Oral bid  moldable orthotics: moldable orthotics, See Instructions, Instructions: use daily with diabetic shoes, Supply, # 6 EA, 0 Refill(s), Type: Maintenance, Pharmacy: [facility], use daily with diabetic shoes  potassium chloride 20 mEq oral tablet, extended release: = 1 tab(s) ( 20 mEq ), PO, daily, # 90 tab(s), 3 Refill(s), Type: Maintenance, Pharmacy: [facility] [id number] [facility], 1 tab(s) Oral daily  triamcinolone 0.1% topical ointment: 1 jonathan, top, tid, Instructions: apply a thin film  to affected area  Area needs to be dry, # 60 gm, 1 Refill(s), Type: Maintenance, Pharmacy: [id number], 1 jonathan top tid,Instr:apply a thin film; to affected area; Area needs to be dry  Documented Medications  Documented  Aleve 220 mg oral capsule: = 1 cap(s) ( 220 mg ), Oral, q12 hrs, 0 Refill(s), Type: Maintenance  Daily Multiple Vitamins: PO, Daily, 0  Tylenol: PO, PRN: as needed for pain, 0 Refill(s), Type: Maintenance  Vitamin D3 1000 intl units oral capsule: 1 cap(s) ( 1,000 International Unit ), PO, daily, 0 Refill(s), Type:  Soft Stop  aspirin 81 mg oral tablet: 1 tab(s) ( 81 mg ), PO, Daily, # 30 tab(s), 0 Refill(s), Type: Maintenance  calcium (as carbonate)-vitamin D 600 mg-125 intl units oral tablet: 1 tab(s), Oral, tid, 0 Refill(s), Type: Maintenance   Problem list:    All Problems (Selected)  Varicose Veins of Legs / ICD-9-.9 / Confirmed  Urge incontinence / SNOMED CT 431468108 / Confirmed  Polio / ICD-9-.90 / Confirmed  Osteoporosis / ICD-9-.00 / Confirmed  Obesity / ICD-9-.00 / Probable  OA (Osteoarthritis) / ICD-9-.90 / Confirmed  Mixed hyperlipidemia / SNOMED CT 135353391 / Confirmed  Metabolic Syndrome / ICD-9-.7 / Confirmed  Hyperplastic Colonic Polyp / SNOMED CT 640761194 / Confirmed  EDEMA / ICD-9-.3 / Confirmed  Diabetes mellitus, type 2 / SNOMED CT 933087951 / Confirmed  Diabetes mellitus with diabetic neuropathy / SNOMED CT 442568499 / Confirmed  CVA, old, cognitive deficits / ICD-9-.0 / Confirmed  Chronic Venous Insufficiency / ICD-9-.81 / Confirmed      Histories   Past Medical History:    Active  Hyperplastic Colonic Polyp (008088263): Onset in 2004 at 64 years.  OA (Osteoarthritis) (715.90)  Osteoporosis (733.00)  EDEMA (782.3)  Metabolic Syndrome (277.7)  Polio (045.90)  Chronic Venous Insufficiency (459.81)  Varicose Veins of Legs (454.9)  Resolved  Inpatient stay (949765060): Onset on 10/10/2018 at 78 years.  Resolved on 10/13/2018 at 78 years.  Comments:  10/18/2018 CDT 9:42 AM CDT - [facility], [place] - Acute and chronic right-sided weakness.  Fracture of Wrist (814.00): Onset in 2004 at 63 years.  Resolved.  Comments:  5/8/2013 CDT 11:22 AM CDT - Michelle Zhao  Left    5/8/2013 CDT 11:25 AM CDT - Michelle Zhao  Work-related fall.  Buttock Pain (729.1): Onset in 2004 at 63 years.  Resolved.  Comments:  5/8/2013 CDT 11:24 AM CDT - Michelle Zhao  Work-related fall.  Chronic left buttock pain since then.  Stroke (436): Onset on 7/14/2003 at 63 years.   Resolved.  Comments:  5/8/2013 CDT 12:03 PM Michelle Preston  Acute CVA with right-sided weakness.  Tobacco user (305.1):  Resolved.  Comments:  5/8/2013 CDT 11:46 AM Michelle Preston  Patient states that she quit in 1989.   Procedure history:    Extracapsular cataract extraction and insertion of intraocular lens (175578399) on 9/15/2016 at 76 Years.  Comments:  10/10/2016 1:01 PM Rachel Morales  Right.  Extracapsular cataract extraction and insertion of intraocular lens (572884461) on 9/1/2016 at 76 Years.  Comments:  9/8/2016 2:37 PM Rachel Morales  Left.  Colonoscopy (054021479) on 6/25/2014 at 74 Years.  Comments:  6/25/2014 9:16 AM Demond Mccoy MD  Moderate left diberticulosis. No polyps. Repeat 10 years.  Sclerotherapy of vein of lower limb (0325581588) in 2010 at 70 Years.  Comments:  5/8/2013 11:40 AM Michelle Preston  Left leg in 03/2010.  Right leg in 04/2010.  [facility].  Laser ablation of long saphenous vein (6360483815) on 8/3/2009 at 69 Years.  Comments:  5/8/2013 11:45 AM Michelle Preston  Right great and right accessory saphenous veins.  Fluoroscopically-guided needle placement for injection of ischial bursa on the left. on 10/10/2008 at 68 Years.  Comments:  5/8/2013 11:49 AM Michelle Preston  [facility].  Bone density scan (429995984) on 5/31/2007 at 67 Years.  Comments:  5/8/2013 11:58 AM Michelle Preston  Osteopenia  Fracture of Wrist (814.00) in 2004 at 64 Years.  Comments:  5/8/2013 11:54 AM Michelle Preston  Left  Colonoscopy (160798520) on 4/1/2004 at 63 Years.  Comments:  8/8/2011 7:35 AM Rachel Morales  Repeat in 10 yrs.    10/4/2010 11:58 AM Demond Mccoy MD  diverticulosis. single hyperplastic rectal polyp  Bone density scan (754810305) in 2002 at 62 Years.  Arthroscopy of knee (041037390) in 2000 at 60 Years.  Comments:  5/8/2013 12:08 PM CDT - Michelle Zhao  Left knee.  Flexible sigmoidoscopy (49508104) on 7/22/1999 at 59  Years.  Arthroscopy of Knee (80.26) in  at 58 Years.   section ([id number]).  Comments:  2013 11:52 AM CDT - Michelle Zhao's    2010 12:42 PM CST - Gons[facility] Mila   3, Para 3   Social History:        Alcohol Assessment: Denies Alcohol Use            Never      Tobacco Assessment: Past            Past, Cigarettes, 15 per day.  Started age 19 Years.  Stopped age 44 Years.      Substance Abuse Assessment: Denies Substance Abuse      Employment and Education Assessment            Retired      Home and Environment Assessment            Marital status: .  3 children.      Other Assessment            Marital status,         Physical Examination   Vital Signs   2019 1:29 PM CDT Peripheral Pulse Rate 73 bpm    HR Method Electronic    Systolic Blood Pressure 126 mmHg    Diastolic Blood Pressure 60 mmHg    Mean Arterial Pressure 82 mmHg    BP Method Manual    Oxygen Saturation 98 %      Measurements from flowsheet : Measurements   2019 1:29 PM CDT Height Measured - Standard 64 in    Weight Measured - Standard 193.2 lb    BSA 1.99 m2    Body Mass Index 33.16 kg/m2  HI      Cardiovascular:       Edema: Lower extremity, 2+, Not pitting.    Integumentary:  Intact, No pallor, No rash.       Impression and Plan      Diagnosis   Peripheral edema (OPP68-RY R60.9)   Patient Instructions:       Counseled: Patient, Regarding diagnosis, Regarding treatment, Activity, Verbalized understanding.    Elevate legs as able. Compression hose as able. FU if edema worsens from chronic state, or other concerns.

## 2022-02-15 NOTE — TELEPHONE ENCOUNTER
---------------------  From: Kusum Alexander CMA (Phone Messages Pool ([id number]_[facility]))   To: Jan York PA-C;     Sent: 12/10/2019 10:30:52 AM CST  Subject: phone message : pharmacy change/refills     PCP:   AFSHAN      Time of Call:  10:20am       Person Calling:  Pat  Phone number:  [phone]  Leave a detailed Message:     Returned call at: spoke with patient     Note:   Patient called, she is requesting refills of atorvastatin 80mg, Furosemide 40mg and her nystatin topical cream. All of these were filled last month at [facility] per patient [facility] does not accept her insurance. Patient would like them transferred to [facility] in [place].     Resent Atorvastatin and Furosemide.     Last office visit and reason:  11/08/19 Edema/DM      Pharmacy: [facility]    FWD to: [facility] due [facility] being out of clinic.---------------------  From: Jan York PA-C   To: Phone Messages Pool ([id number]_[place] [facility] [place]);     Sent: 12/10/2019 10:37:14 AM CST  Subject: RE: phone message : pharmacy change/refills     I sent that in    [facility] Call    Time: 11:02am    Note: Called to let patient know that Rx was sent in to the pharmacy.

## 2022-02-15 NOTE — TELEPHONE ENCOUNTER
---------------------  From: Aniya/Taylor LOZOYA (Phone Messages Pool ([id number]_[facility]))   To: Richard Conde MD;     Sent: 7/2/2020 11:14:37 AM CDT  Subject: Legs     Phone Message    PCP: AFSHAN    Time of Call: 1058    Phone Number: [phone]    Returned call at: 1110    Note: Donna- pt's friend called stating that she took pt to ER yesterday for her legs. Donna says that the doctors just keep telling pt to keep her legs up. Donna stated that pt's legs are red and painful and they are not getting better and she has been having issues with falling. Donna is wondering if there is anything else they can do for pt. Donna asking for a referral to a vascular surgeon. Donna is very concerned for pt.---------------------  From: Richard Conde MD   To: Phone Messages Pool ([id number]_[place] [facility] [place]);     Sent: 7/2/2020 11:41:21 AM CDT  Subject: RE: Legs     I tried to call patient directly as we would need to speak to her. Went to voicemail and mailbox is full. I am open to referring her to a vascular surgeon if patient is open to that but we would need to get the OK from the patient.    Can you please call Donna back and let her know that if you can speak with the patient and she gives us the OK, we will set up the consult. I am also happy to see her when I am in clinic next week for face to face visits, or if they have urgent concerns, we can get her set up to see one of my partners today or tomorrow.    ThanksTried to call Donna at 1146. Someone picked up the phone and hung up. Needing to have donna let pt know we are trying to get ahold of pt. LVMTCB @ 120.---------------------  From: Viviane Zhao CMA (Phone Messages Pool ([id number]_[place] [facility] [place]))   To: AFSHAN Message Pool ([id number]_[facility]);     Sent: 7/5/2020 9:42:32 AM CDT  Subject: FW: Legs---------------------  From: Kusum Alexander CMA ([facility] Message Pool ([id number]_[facility]))   To: Phone Messages Pool ([id number]_[place] [facility] [place]);     Sent: 7/6/2020 10:00:24 AM  CDT  Subject: FW: LegsReturn Call    Time: 10:41am    Note: Was able to get Anna, she was going to take the phone to patient but call was disconnected. also tried patients number and VM box is full.---------------------  From: Eliana Sibley MA (Phone Messages Pool ([id number]_[facility]))   To: zintin (32224_[facility]);     Sent: 7/6/2020 10:50:55 AM CDT  Subject: RE: Legs     Return Call     Time: 10:50am    Note: I called and spoke with patient and she would like to get set up with a Vascular Surgeon. Patient states she is doing okay but still having some issues.---------------------  From: Eliana Sibley MA ([employer] Message Pool ([id number]_[facility]))   To: Phone Messages Pool (32224_[facility]);     Sent: 7/6/2020 10:51:32 AM CDT  Subject: FW: Legs---------------------  From: Eliana Sibley MA (Phone Messages Pool ([id number]_[place] [facility] [place]))   To: Richard Conde MD;     Sent: 7/6/2020 10:51:49 AM CDT  Subject: FW: Legs  ** Submitted: **  Order:Referral (Request)  Details:  7/6/2020 11:19 AM CDT, Referred to: Vascular Surgery, Chronic Venous Insufficiency  Peripheral edema         Signed by Richard Conde MD  7/6/2020 4:19:00 PM [facility]---------------------  From: Richard Conde MD   To: Referral Coordinators Pool ([id number]_[employer]);     Sent: 7/6/2020 11:20:14 AM CDT  Subject: FW: LegsUnable to connect with patient on phone, referral being faxed to Dr. Tsai @ [facility], if patient calls here she can call there to schedule at [phone].

## 2022-02-15 NOTE — TELEPHONE ENCOUNTER
Entered by Rocío Lyles CMA on Jennifer 15, 2021 2:59:24 PM CDT  ---------------------  From: Rocío Lyles CMA   To: deltamethod    Sent: 6/15/2021 2:59:24 PM CDT  Subject: Medication Management     ** Approved with modifications: **  Miscellaneous Prescription (MOLDABLE ORTHOTICS)  USE DAILY WITH DIABETIC SHOES E11.9  Qty:  2 EA        Days Supply:  2        Refills:  0          Substitutions Allowed     Route To Pharmacy - [facility]   Signed by Rocío Lyles CMA            ** Patient matched by Rocío Lyles CMA on 6/15/2021 2:58:27 PM CDT **      ------------------------------------------  From: Clio  To: Richard Conde MD  Sent: Jennifer 15, 2021 2:34:12 PM CDT  Subject: Medication Management  Due: June 4, 2021 8:25:53 PM CDT     ** On Hold Pending Signature **     Drug: MOLDABLE ORTHOTICS, USE DAILY WITH DIABETIC SHOES E11.9  Quantity: 2 EA  Days Supply: 2  Refills: 0  Substitutions Allowed  Notes from Pharmacy:     Dispensed Drug: MOLDABLE ORTHOTICS, USE DAILY WITH DIABETIC SHOES E11.9  Quantity: 2 EA  Days Supply: 2  Refills: 0  Substitutions Allowed  Notes from Pharmacy:  ------------------------------------------

## 2022-02-15 NOTE — NURSING NOTE
Comprehensive Intake Entered On:  5/9/2019 10:57 AM CDT    Performed On:  5/9/2019 10:52 AM CDT by Kusum Alexander CMA               Summary   Chief Complaint :   RX for lift recliner chair   Advance Directive :   Yes   Weight Measured :   190.2 lb(Converted to: 190 lb 3 oz, 86.27 kg)    Height Measured :   64 in(Converted to: 5 ft 4 in, 162.56 cm)    Body Mass Index :   32.64 kg/m2 (HI)    Body Surface Area :   1.97 m2   Systolic Blood Pressure :   112 mmHg   Diastolic Blood Pressure :   60 mmHg   Mean Arterial Pressure :   77 mmHg   Peripheral Pulse Rate :   64 bpm   BP Method :   Manual   HR Method :   Manual   Temperature Tympanic :   96.3 DegF(Converted to: 35.7 DegC)  (LOW)    Kusum Alexander CMA - 5/9/2019 10:52 AM CDT   Health Status   Allergies Verified? :   Yes   Medication History Verified? :   Yes   Immunizations Current :   Yes   Medical History Verified? :   Yes   Pre-Visit Planning Status :   Completed   Tobacco Use? :   Former smoker   Kusum Alexander CMA - 5/9/2019 10:52 AM CDT   Consents   Consent for Immunization Exchange :   Consent Granted   Consent for Immunizations to Providers :   Consent Granted   Kusum Alexander CMA - 5/9/2019 10:52 AM CDT   Meds / Allergies   (As Of: 5/9/2019 10:57:24 AM CDT)   Allergies (Active)   Latex  Estimated Onset Date:   Unspecified ; Created By:   Ramila Mayes CMA; Reaction Status:   Active ; Category:   Environment ; Substance:   Latex ; Type:   Allergy ; Updated By:   Ramila Mayes CMA; Reviewed Date:   5/9/2019 10:53 AM CDT      opthmolic eye drops  Estimated Onset Date:   Unspecified ; Reactions:   Swelling of eye.. ; Comments:     Comment 1: Gentamycin opthalmic   ; Created By:   Michelle Zhao; Reaction Status:   Active ; Category:   Drug ; Substance:   opthmolic eye drops ; Type:   Allergy ; Updated By:   Michelle Zhao; Source:   Paper Chart ; Reviewed Date:   5/9/2019 10:53 AM CDT      Vicodin  Estimated Onset Date:   Unspecified ; Reactions:   behavioral disturbances ;  Created By:   Elyse Anne RN; Reaction Status:   Active ; Category:   Drug ; Substance:   Vicodin ; Type:   Allergy ; Updated By:   Elyse Anne RN; Reviewed Date:   5/9/2019 10:53 AM CDT      Zocor  Estimated Onset Date:   Unspecified ; Reactions:   Diarrhea ; Created By:   Michelle Zhao; Reaction Status:   Active ; Category:   Drug ; Substance:   Zocor ; Type:   Allergy ; Updated By:   Michelle Zhao; Reviewed Date:   5/9/2019 10:53 AM CDT        Medication List   (As Of: 5/9/2019 10:57:24 AM CDT)   Prescription/Discharge Order    amoxicillin  :   amoxicillin ; Status:   Processing ; Ordered As Mnemonic:   amoxicillin 875 mg oral tablet ; Ordering Provider:   Richard Conde MD; Action Display:   Complete ; Catalog Code:   amoxicillin ; Order Dt/Tm:   5/9/2019 10:55:17 AM          atorvastatin  :   atorvastatin ; Status:   Prescribed ; Ordered As Mnemonic:   atorvastatin 80 mg oral tablet ; Simple Display Line:   80 mg, 1 tab(s), po, daily, 90 tab(s), 3 Refill(s) ; Ordering Provider:   Richard Conde MD; Catalog Code:   atorvastatin ; Order Dt/Tm:   9/18/2018 1:25:04 PM          furosemide  :   furosemide ; Status:   Prescribed ; Ordered As Mnemonic:   Lasix 40 mg oral tablet ; Simple Display Line:   40 mg, 1 tab(s), PO, Daily, 90 tab(s), 3 Refill(s) ; Ordering Provider:   Richard Conde MD; Catalog Code:   furosemide ; Order Dt/Tm:   9/18/2018 1:25:05 PM          metFORMIN  :   metFORMIN ; Status:   Prescribed ; Ordered As Mnemonic:   metFORMIN 500 mg oral tablet ; Simple Display Line:   500 mg, 1 tab(s), PO, BID, 180 tab(s), 3 Refill(s) ; Ordering Provider:   Richard Conde MD; Catalog Code:   metFORMIN ; Order Dt/Tm:   9/18/2018 1:25:08 PM          Miscellaneous Prescription  :   Miscellaneous Prescription ; Status:   Prescribed ; Ordered As Mnemonic:   moldable orthotics ; Simple Display Line:   See Instructions, use daily with diabetic shoes, 6 EA, 0 Refill(s) ; Ordering Provider:   Elton DAMON  Richard; Catalog Code:   Miscellaneous Prescription ; Order Dt/Tm:   9/18/2018 1:29:55 PM          Miscellaneous Rx Supply  :   Miscellaneous Rx Supply ; Status:   Prescribed ; Ordered As Mnemonic:   custom fit diabetic shoes ; Simple Display Line:   See Instructions, wear daily for heel pain, neuropathy, venous insufficiency, 2 EA, 0 Refill(s) ; Ordering Provider:   Richard Conde MD; Catalog Code:   Miscellaneous Rx Supply ; Order Dt/Tm:   9/18/2018 1:29:36 PM          oxybutynin  :   oxybutynin ; Status:   Prescribed ; Ordered As Mnemonic:   Ditropan XL 10 mg/24 hr oral tablet, extended release ; Simple Display Line:   10 mg, 1 tab(s), PO, Daily, 90 tab(s), 3 Refill(s) ; Ordering Provider:   Richard Conde MD; Catalog Code:   oxybutynin ; Order Dt/Tm:   9/18/2018 1:25:02 PM          potassium chloride  :   potassium chloride ; Status:   Prescribed ; Ordered As Mnemonic:   potassium chloride 20 mEq oral tablet, extended release ; Simple Display Line:   20 mEq, 1 tab(s), PO, daily, 90 tab(s), 3 Refill(s) ; Ordering Provider:   Richard Conde MD; Catalog Code:   potassium chloride ; Order Dt/Tm:   9/18/2018 1:25:07 PM          triamcinolone topical  :   triamcinolone topical ; Status:   Prescribed ; Ordered As Mnemonic:   triamcinolone 0.1% topical ointment ; Simple Display Line:   1 jonathan, top, tid, apply a thin film  to affected area  Area needs to be dry, 60 gm, 1 Refill(s) ; Ordering Provider:   Richard Conde MD; Catalog Code:   triamcinolone topical ; Order Dt/Tm:   12/11/2017 9:03:44 AM            Home Meds    acetaminophen  :   acetaminophen ; Status:   Documented ; Ordered As Mnemonic:   Tylenol ; Simple Display Line:   PO, PRN: as needed for pain ; Catalog Code:   acetaminophen ; Order Dt/Tm:   1/15/2010 1:47:50 PM          aspirin  :   aspirin ; Status:   Documented ; Ordered As Mnemonic:   aspirin 81 mg oral tablet ; Simple Display Line:   81 mg, 1 tab(s), PO, Daily, 30 tab(s) ; Catalog Code:    aspirin ; Order Dt/Tm:   3/22/2012 10:42:39 AM          calcium-vitamin D  :   calcium-vitamin D ; Status:   Documented ; Ordered As Mnemonic:   calcium (as carbonate)-vitamin D 600 mg-125 intl units oral tablet ; Simple Display Line:   1 tab(s), Oral, tid, 0 Refill(s) ; Catalog Code:   calcium-vitamin D ; Order Dt/Tm:   3/12/2019 11:50:47 AM          cholecalciferol  :   cholecalciferol ; Status:   Documented ; Ordered As Mnemonic:   Vitamin D3 1000 intl units oral capsule ; Simple Display Line:   1,000 International Unit, 1 cap(s), PO, daily ; Catalog Code:   cholecalciferol ; Order Dt/Tm:   4/16/2012 11:05:56 AM          multivitamin  :   multivitamin ; Status:   Documented ; Ordered As Mnemonic:   Daily Multiple Vitamins ; Simple Display Line:   PO, Daily ; Catalog Code:   multivitamin ; Order Dt/Tm:   1/15/2010 1:47:00 PM          naproxen  :   naproxen ; Status:   Documented ; Ordered As Mnemonic:   Aleve 220 mg oral capsule ; Simple Display Line:   220 mg, 1 cap(s), Oral, q12 hrs, 0 Refill(s) ; Catalog Code:   naproxen ; Order Dt/Tm:   3/12/2019 11:51:40 AM

## 2022-02-15 NOTE — PROGRESS NOTES
Chief Complaint    DM Med Check; Blood Sugars have been good; Foot Exam: Right foot abnormal unable to feel the pricks, Left foot normal;  increase in falls; right side seems to be weaker; reddness in grion area  History of Present Illness      1. follow up diabetes, Chief complaint and symptoms reviewed with patient and confirmed as above.      callus noted on right great toe, has neuropathy in that foot from prior stroke and from diabetes      discussed podiatry referral, patient will consider, would get daughter to take her      patient did just get new shoes from [facility], notes that the diabetes shoes that she previously had were not comfortable and did not feel sturdy      A1c is excellent, done in November, fasting labs due again in May      2. right hand contraction is worsening, has a history of stroke and had polio as a child that she thinks may have contributed      had episode 15 months ago with worsening right sided weakness, thinks it worsened again one year ago      has followed up with neurology      since then has noticed decreased function of hand      discussed referral for therapy but patient does not think it would be helpful at this point      3. patient has been having more issues with falls and balance, has a walker, has been avoiding being outside with the slippery weather      has been trying to stay active but avoiding falls      persistent right sided weakness noted      4. recurrent episodes of tinea rash in groin, improves with nystatin, needs refills      5. due for refills, no concerns with medications, labs will be due in May         Review of Systems      no change in fatigue, no concerns about mood      no chest pain, no shortness of breath      good appetite, normal bowel function      swelling in legs unchanged, mild redness in right leg is unchanged  Physical Exam   Vitals & Measurements    HR: 72(Peripheral)  BP: 122/72  SpO2: 97%     HT: 64 in  WT: 185.0 lb  BMI: 31.75        patient is alert, cooperative, in no distress      eyes: PERRL, EOM intact, normal conjunctiva      heent: normocephalic, oral mucosa moist      neck: supple, no lymphadenopathy, no thyromegaly      CV: RRR, no murmur, 2+ edema in right leg, 1+ left leg, mild redness but no increased warmth      lungs: clear and equal bilaterally, no rales, rhonchi, or wheezes, diminished in bases      right hand is closed in a fist, cannot open actively but full ROM noted with passive movement      feet with decreased sensation worst on right, callus present on medial aspect of great toe, no ulcerations  Assessment/Plan       1. Diabetes mellitus, type 2 (E11.9)         A1c done in November, will be repeated in May with fasting labs        overall has been adequately controlled        continue current regimen, meds refilled                2. Diabetes mellitus with diabetic neuropathy (E11.40)         given neuropathy and callus may need to consider new shoes but patient feels like new SAS shoes are comfortable        agreeable with referral to podiatry        neuropathy likely multifactoral given prior stroke to right side         Ordered:          Referral (Request), 01/20/20 10:10:00 CST, Referred to: Podiatry, Diabetes mellitus with diabetic neuropathy  Callus of foot                4. 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 Chronic Venous Insufficiency (I87.2)         swelling in legs is ongoing but stable        will continue diuretics        follow up for labs in May                Orders:         metFORMIN, = 1 tab(s) ( 500 mg ), PO, BID, # 180 tab(s), 3 Refill(s), Type: Hard Stop, Pharmacy: [facility] , (Completed)         metFORMIN, = 1 tab(s) ( 500 mg ), PO, BID, # 180 tab(s), 3 Refill(s), Type: Maintenance, Pharmacy: [id number], 1 tab(s) Oral bid, (Ordered)         nystatin topical, 1 jonathan, Topical, tid, # 30 gm, 5 Refill(s), Type: Maintenance, Pharmacy: [id number], 1 jonathan Topical tid, (Ordered)         nystatin topical, 1 jonathan, Topical, tid, # 30 gm, 0 Refill(s), Type: Hard Stop, Pharmacy: [facility], (Completed)         oxybutynin, = 1 tab(s) ( 10 mg ), PO, Daily, # 90 tab(s), 3 Refill(s), Type: Maintenance, Pharmacy: [facility] , 1 tab(s) Oral daily, (Completed)         potassium chloride, = 1 tab(s) ( 20 mEq ), Oral, bid, # 180 tab(s), 3 Refill(s), Type: Maintenance, Pharmacy: [facility], 1 tab(s) Oral bid, (Ordered)         Return to Clinic (Request), RFV: DM med check and labs, Return in 6 months  Patient Information     Name:PAKO BARBER      Address:      [address] [place] [id number]     Sex:Female     YOB: 1940     [phone]     Emergency Contact:ASIF BERNAL     MRN:[id number]     FIN:[id number]     Location:[facility]     Date of Service:01/20/2020      Primary Care Physician:       Richard Conde MD, [phone]      Attending Physician:       Richard Conde MD, [phone]  Problem List/Past Medical History    Ongoing     Chronic Venous Insufficiency     CVA, old, cognitive deficits     Diabetes mellitus with diabetic neuropathy     Diabetes mellitus, type 2     EDEMA     Hyperplastic Colonic Polyp     Ischial Bursitis       Comments: Left.     Metabolic Syndrome     Mixed hyperlipidemia     OA (Osteoarthritis)     Obesity     Osteoporosis     Polio     Tear of Medial Meniscus of Knee       Comments: Left knee.     Urge incontinence     Varicose Veins of Legs    Historical     Buttock Pain       Comments: Work-related fall. Chronic left buttock pain since then.     Fracture of Wrist       Comments: Work-related fall. Left     Inpatient stay       Comments: [facility]  Acute and chronic right-sided weakness.     Stroke       Comments: Acute CVA with right-sided weakness.     Tobacco user       Comments: Patient states that she quit in .  Procedure/Surgical History     Extracapsular cataract extraction and insertion of intraocular lens (09/15/2016)      Comments: Right..     Extracapsular cataract extraction and insertion of intraocular lens (2016)      Comments: Left..     Colonoscopy (2014)      Comments: Moderate left diberticulosis. No polyps. Repeat 10 years..     Sclerotherapy of vein of lower limb ()      Comments: Left leg in 2010.  Right leg in 2010.  [facility]..     Laser ablation of long saphenous vein (2009)      Comments: Right great and right accessory saphenous veins..     Fluoroscopically-guided needle placement for injection of ischial bursa on the left. (10/10/2008)      Comments: [facility]..     Bone density scan (2007)      Comments: Osteopenia.     Colonoscopy (2004)      Comments: Repeat in 10 yrs.. diverticulosis. single hyperplastic rectal polyp.     Fracture of Wrist ()      Comments: Left.     Bone density scan ()     Arthroscopy of knee ()      Comments: Left knee..     Flexible sigmoidoscopy (1999)     Arthroscopy of Knee ()      section      Comments:  3, Para 3. 's.  Medications    Aleve 220 mg oral capsule, 220 mg= 1 cap(s), Oral, q12 hrs    aspirin 81 mg oral tablet, 81 mg= 1 tab(s), Oral, daily    atorvastatin 80 mg oral tablet, 1 tab(s), Oral, daily, 3 refills    azelastine 0.05% ophthalmic solution, See Instructions    custom fit diabetic shoes, See Instructions    electric recliner and lift chair, See Instructions    Klor-Con M20 oral tablet, extended release, 20 mEq= 1 tab(s), Oral, bid, 3 refills    Lasix 40 mg oral tablet, 40 mg= 1 tab(s), Oral, daily, 3 refills    metFORMIN 500 mg oral tablet, 500 mg= 1 tab(s), Oral, bid, 3 refills     moldable orthotics, See Instructions    nystatin 100,000 units/g topical cream, 1 jonathan, Topical, tid, 5 refills    Tylenol, Oral, PRN    Vitamin D3 1000 intl units oral capsule, 1000 International Unit= 1 cap(s), Oral, daily  Allergies    Latex    Vicodin (behavioral disturbances)    Zocor (Diarrhea)    opthmolic eye drops (Swelling of eye..)  Social History    Smoking Status - 01/20/2020     Former smoker     Alcohol - Denies Alcohol Use, 10/04/2010      Never, 11/05/2013     Employment/School      Retired, 02/12/2013     Home/Environment      Marital status: . 3 children., 05/08/2013     Other      Marital status, , 11/29/2010     Substance Abuse - Denies Substance Abuse, 11/29/2010     Tobacco - Past, 11/29/2010      Past, Cigarettes, 15 per day. Started age 19 Years. Stopped age 44 Years., 11/29/2010  Family History    Bladder cancer..: Mother.    Heart disease: Mother, Brother and Grandmother (M).  Immunizations      Vaccine Date Status          influenza virus vaccine, inactivated 09/18/2018 Given          influenza virus vaccine, inactivated 09/12/2017 Given          influenza virus vaccine, inactivated 09/26/2016 Given          influenza virus vaccine, inactivated 11/24/2015 Given          pneumococcal (PCV13) 04/22/2015 Recorded              Comments : sky Rogel 02/05/2015 Recorded              Comments : [2/6/2015] Given @ [id number]          influenza virus vaccine, inactivated 10/02/2014 Given          influenza virus vaccine, inactivated 11/05/2013 Given          influenza virus vaccine, inactivated 12/19/2012 Given          tetanus 03/22/2012 Given          Td 03/22/2012 Recorded          influenza 12/12/2011 Recorded          influenza virus vaccine, inactivated 10/04/2010 Given          pneumococcal (PPSV23) 01/01/2009 Recorded          influenza 09/26/2007 Recorded          Td 10/10/1994 Recorded  Lab Results       Lab Results (Last 4 results within 90 days)         Sodium Level: 139 mmol/L [135 mmol/L - 146 mmol/L] (11/08/19 14:50:00)       Potassium Level: 4.3 mmol/L [3.5 mmol/L - 5.3 mmol/L] (11/08/19 14:50:00)       Chloride Level: 107 mmol/L [98 mmol/L - 110 mmol/L] (11/08/19 14:50:00)       CO2 Level: 22 mmol/L [20 mmol/L - 32 mmol/L] (11/08/19 14:50:00)       Glucose Level: 87 mg/dL [65 mg/dL - 99 mg/dL] (11/08/19 14:50:00)       BUN: 16 mg/dL [7 mg/dL - 25 mg/dL] (11/08/19 14:50:00)       Creatinine Level: 0.75 mg/dL [0.6 mg/dL - 0.93 mg/dL] (11/08/19 14:50:00)       BUN/Creat Ratio: NOT APPLICABLE [6  - 22] (11/08/19 14:50:00)       eGFR: 77 mL/min/1.73m2 (11/08/19 14:50:00)       eGFR : 89 mL/min/1.73m2 (11/08/19 14:50:00)       Calcium Level: 9.6 mg/dL [8.6 mg/dL - 10.4 mg/dL] (11/08/19 14:50:00)       Hgb A1c: 6.4 High (11/08/19 14:50:00)       Cholesterol: 274 mg/dL High (11/08/19 14:50:00)       Non-HDL Cholesterol: 237 High (11/08/19 14:50:00)       HDL: 37 mg/dL Low (11/08/19 14:50:00)       Cholesterol/HDL Ratio: 7.4 High (11/08/19 14:50:00)       LDL: See comment (11/08/19 14:50:00)       LDL Direct: 151 mg/dL High (11/08/19 14:50:00)       Triglyceride: 559 mg/dL High (11/08/19 14:50:00)       WBC: 6.2 [3.8  - 10.8] (11/08/19 14:50:00)       RBC: 4.08 [3.8  - 5.1] (11/08/19 14:50:00)       Hgb: 12.8 gm/dL [11.7 gm/dL - 15.5 gm/dL] (11/08/19 14:50:00)       Hct: 37.8 % [35 % - 45 %] (11/08/19 14:50:00)       MCV: 92.6 fL [80 fL - 100 fL] (11/08/19 14:50:00)       MCH: 31.4 pg [27 pg - 33 pg] (11/08/19 14:50:00)       MCHC: 33.9 gm/dL [32 gm/dL - 36 gm/dL] (11/08/19 14:50:00)       RDW: 12.3 % [11 % - 15 %] (11/08/19 14:50:00)       Platelet: 174 [140  - 400] (11/08/19 14:50:00)       MPV: 10.3 fL [7.5 fL - 12.5 fL] (11/08/19 14:50:00)       Lymphocytes: 33 % (11/08/19 14:50:00)       Abs Lymphocytes: 2046 [850  - 3900] (11/08/19 14:50:00)       Neutrophils: 55.1 % (11/08/19 14:50:00)       Abs Neutrophils: 3416 [1500  - 7800] (11/08/19  14:50:00)       Monocytes: 8.1 % (11/08/19 14:50:00)       Abs Monocytes: 502 [200  - 950] (11/08/19 14:50:00)       Eosinophils: 2.7 % (11/08/19 14:50:00)       Abs Eosinophils: 167 [15  - 500] (11/08/19 14:50:00)       Basophils: 1.1 % (11/08/19 14:50:00)       Abs Basophils: 68 [0  - 200] (11/08/19 14:50:00)

## 2022-02-15 NOTE — LETTER
(Inserted Image. Unable to display)                                                                                                [address] [address]                                                July 14, 2020Re: PAKO BARBER1940 Eulalio  [facility] [address] [place] [id number]: Dr. Tsai The following patient has been referred to your office/practice:  PAKO ELISABETH Appointment:  Appointment is PendingPlease refer to the attached  clinical documentation for a summary of PAKO's care.  Please do not hesitate to contact our office if any additional clinical questions arise.  All relevant records and transition of care documents should be mailed or faxed.Your assistance in providing continuity of care is appreciated. Sincerely, [facility] [place] [place] [address](P) [phone](F) [phone]

## 2022-02-15 NOTE — LETTER
(Inserted Image. Unable to display)   March 20, 2019      PAKO BARBER  [address]   [place], [place] [id number]        Dear PAKO,      Thank you for selecting [facility] ([facility]viously [facility]) for your healthcare needs.     Our records indicate you are due for the following services:     Follow-up office visit     To schedule an appointment or if you have further questions, please contact your primary clinic:   [facility]          [phone]   [facility]    [phone]             [facility]         [phone]      Powered by [employer]    Sincerely,    Negrito Abdul M.D.

## 2022-02-15 NOTE — NURSING NOTE
Comprehensive Intake Entered On:  1/20/2020 9:53 AM CST    Performed On:  1/20/2020 9:39 AM CST by Kusum Alexander CMA               Summary   Chief Complaint :   DM Med Check; Blood Sugars have been good; Foot Exam: Right foot abnormal unable to feel the pricks, Left foot normal;  increase in falls; right side seems to be weaker; reddness in grion area   Advance Directive :   Yes   Menstrual Status :   Postmenopausal   Weight Measured :   185.0 lb(Converted to: 185 lb 0 oz, 83.91 kg)    Height Measured :   64 in(Converted to: 5 ft 4 in, 162.56 cm)    Body Mass Index :   31.75 kg/m2 (HI)    Body Surface Area :   1.94 m2   Systolic Blood Pressure :   122 mmHg   Diastolic Blood Pressure :   72 mmHg   Mean Arterial Pressure :   89 mmHg   Peripheral Pulse Rate :   72 bpm   BP Method :   Manual   HR Method :   Electronic   Oxygen Saturation :   97 %   Kusum Alexander CMA - 1/20/2020 9:39 AM CST   Health Status   Allergies Verified? :   Yes   Medication History Verified? :   Yes   Immunizations Current :   Yes   Medical History Verified? :   Yes   Pre-Visit Planning Status :   Completed   Tobacco Use? :   Former smoker   Kusum Alexander CMA - 1/20/2020 9:39 AM CST   Consents   Consent for Immunization Exchange :   Consent Granted   Consent for Immunizations to Providers :   Consent Granted   Kusum Alexander CMA - 1/20/2020 9:39 AM CST   Meds / Allergies   (As Of: 1/20/2020 9:53:35 AM CST)   Allergies (Active)   Latex  Estimated Onset Date:   Unspecified ; Created By:   Ramila Mayes CMA; Reaction Status:   Active ; Category:   Environment ; Substance:   Latex ; Type:   Allergy ; Updated By:   Ramila Mayes CMA; Reviewed Date:   1/20/2020 9:46 AM CST      opthmolic eye drops  Estimated Onset Date:   Unspecified ; Reactions:   Swelling of eye.. ; Comments:     Comment 1: Gentamycin opthalmic   ; Created By:   Michelle Zhao; Reaction Status:   Active ; Category:   Drug ; Substance:   opthmolic eye drops ; Type:   Allergy ; Updated By:    Michelle Zhao; Source:   Paper Chart ; Reviewed Date:   1/20/2020 9:46 AM CST      Vicodin  Estimated Onset Date:   Unspecified ; Reactions:   behavioral disturbances ; Created By:   Elyse Anne RN; Reaction Status:   Active ; Category:   Drug ; Substance:   Vicodin ; Type:   Allergy ; Updated By:   Elyse Anne RN; Reviewed Date:   1/20/2020 9:46 AM CST      Zocor  Estimated Onset Date:   Unspecified ; Reactions:   Diarrhea ; Created By:   Michelle Zhao; Reaction Status:   Active ; Category:   Drug ; Substance:   Zocor ; Type:   Allergy ; Updated By:   Michelle Zhao; Reviewed Date:   1/20/2020 9:46 AM CST        Medication List   (As Of: 1/20/2020 9:53:35 AM CST)   Prescription/Discharge Order    atorvastatin  :   atorvastatin ; Status:   Prescribed ; Ordered As Mnemonic:   atorvastatin 80 mg oral tablet ; Simple Display Line:   1 tab(s), Oral, daily, 90 tab(s), 3 Refill(s) ; [facility]   [facility]; Catalog Code:   atorvastatin ; Order Dt/Tm:   12/10/2019 10:31:12 AM CST          azelastine ophthalmic  :   azelastine ophthalmic ; Status:   Prescribed ; Ordered As Mnemonic:   azelastine 0.05% ophthalmic solution ; Simple Display Line:   See Instructions, INSTILL 1 DROP INTO BOTH EYES TWICE A DAY AS NEEDED FOR ALLERGY SYMPTOMS, 18 mL, 2 Refill(s) ; Ordering Provider:   Negrito Abdul MD; Catalog Code:   azelastine ophthalmic ; Order Dt/Tm:   11/12/2019 11:27:28 AM CST          azelastine ophthalmic  :   azelastine ophthalmic ; Status:   Prescribed ; Ordered As Mnemonic:   azelastine 0.05% ophthalmic solution ; Simple Display Line:   See Instructions, INSTILL 1 DROP INTO BOTH EYES TWICE A DAY AS NEEDED FOR ALLERGY SYMPTOMS, 18 mL, 5 Refill(s) ; Ordering Provider:   Negrito Abdul MD; Catalog Code:   azelastine ophthalmic ; Order Dt/Tm:   11/8/2019 2:24:22 PM CST          furosemide  :   furosemide ; Status:   Prescribed ; Ordered As Mnemonic:   Lasix 40 mg oral tablet ; Simple  Display Line:   40 mg, 1 tab(s), PO, Daily, 90 tab(s), 3 Refill(s) ; Ordering Provider:   Richard Conde MD; Catalog Code:   furosemide ; Order Dt/Tm:   12/10/2019 10:31:13 AM CST          metFORMIN  :   metFORMIN ; Status:   Prescribed ; Ordered As Mnemonic:   metFORMIN 500 mg oral tablet ; Simple Display Line:   500 mg, 1 tab(s), PO, BID, 180 tab(s), 3 Refill(s) ; Ordering Provider:   Negrito Abdul MD; Catalog Code:   metFORMIN ; Order Dt/Tm:   11/8/2019 2:26:48 PM CST          Miscellaneous Prescription  :   Miscellaneous Prescription ; Status:   Prescribed ; Ordered As Mnemonic:   moldable orthotics ; Simple Display Line:   See Instructions, use daily with diabetic shoes, 6 EA, 0 Refill(s) ; Ordering Provider:   Richard Conde MD; Catalog Code:   Miscellaneous Prescription ; Order Dt/Tm:   9/18/2018 1:29:55 PM CDT          Miscellaneous Rx Supply  :   Miscellaneous Rx Supply ; Status:   Prescribed ; Ordered As Mnemonic:   custom fit diabetic shoes ; Simple Display Line:   See Instructions, wear daily for heel pain, neuropathy, venous insufficiency, 2 EA, 0 Refill(s) ; Ordering Provider:   Richard Conde MD; Catalog Code:   Miscellaneous Rx Supply ; Order Dt/Tm:   9/18/2018 1:29:36 PM CDT          Miscellaneous Rx Supply  :   Miscellaneous Rx Supply ; Status:   Prescribed ; Ordered As Mnemonic:   electric recliner and lift chair ; Simple Display Line:   See Instructions, use for sitting and for elevating legs, 1 EA, 0 Refill(s) ; Ordering Provider:   Richard Conde MD; Catalog Code:   Miscellaneous Rx Supply ; Order Dt/Tm:   5/9/2019 11:15:47 AM CDT          nystatin topical  :   nystatin topical ; Status:   Prescribed ; Ordered As Mnemonic:   nystatin 100,000 units/g topical cream ; Simple Display Line:   1 jonathan, Topical, tid, 30 gm, 0 Refill(s) ; Ordering Provider:   Jna York PA-C; Catalog Code:   nystatin topical ; Order Dt/Tm:   12/10/2019 10:36:50 AM CST          nystatin topical  :    nystatin topical ; Status:   Completed ; Ordered As Mnemonic:   nystatin 100,000 units/g topical cream ; Simple Display Line:   1 jonathan, TOP, TID, 30 g, 2 Refill(s) ; Ordering Provider:   Negrito Abdul MD; Catalog Code:   nystatin topical ; Order Dt/Tm:   11/8/2019 2:32:16 PM CST          oxybutynin  :   oxybutynin ; Status:   Prescribed ; Ordered As Mnemonic:   Ditropan XL 10 mg/24 hr oral tablet, extended release ; Simple Display Line:   10 mg, 1 tab(s), PO, Daily, 90 tab(s), 3 Refill(s) ; Ordering Provider:   Negrito Abdul MD; Catalog Code:   oxybutynin ; Order Dt/Tm:   11/8/2019 2:31:17 PM CST            Home Meds    acetaminophen  :   acetaminophen ; Status:   Documented ; Ordered As Mnemonic:   Tylenol ; Simple Display Line:   PO, PRN: as needed for pain ; Catalog Code:   acetaminophen ; Order Dt/Tm:   1/15/2010 1:47:50 PM CST          aspirin  :   aspirin ; Status:   Documented ; Ordered As Mnemonic:   aspirin 81 mg oral tablet ; Simple Display Line:   81 mg, 1 tab(s), PO, Daily, 30 tab(s) ; Catalog Code:   aspirin ; Order Dt/Tm:   3/22/2012 10:42:39 AM CDT          calcium-vitamin D  :   calcium-vitamin D ; Status:   Documented ; Ordered As Mnemonic:   calcium (as carbonate)-vitamin D 600 mg-125 intl units oral tablet ; Simple Display Line:   1 tab(s), Oral, tid, 0 Refill(s) ; Catalog Code:   calcium-vitamin D ; Order Dt/Tm:   3/12/2019 11:50:47 AM CDT          cholecalciferol  :   cholecalciferol ; Status:   Documented ; Ordered As Mnemonic:   Vitamin D3 1000 intl units oral capsule ; Simple Display Line:   1,000 International Unit, 1 cap(s), PO, daily ; Catalog Code:   cholecalciferol ; Order Dt/Tm:   4/16/2012 11:05:56 AM CDT          multivitamin  :   multivitamin ; Status:   Completed ; Ordered As Mnemonic:   Daily Multiple Vitamins ; Simple Display Line:   PO, Daily ; Catalog Code:   multivitamin ; Order Dt/Tm:   1/15/2010 1:47:00 PM CST          naproxen  :   naproxen ; Status:    Documented ; Ordered As Mnemonic:   Aleve 220 mg oral capsule ; Simple Display Line:   220 mg, 1 cap(s), Oral, q12 hrs, 0 Refill(s) ; Catalog Code:   naproxen ; Order Dt/Tm:   3/12/2019 11:51:40 AM CDT          potassium chloride  :   potassium chloride ; Status:   Documented ; Ordered As Mnemonic:   Klor-Con M20 ; Simple Display Line:   20 mEq, Oral, bid, 0 Refill(s) ; Catalog Code:   potassium chloride ; Order Dt/Tm:   1/20/2020 9:46:58 AM CST

## 2022-02-15 NOTE — TELEPHONE ENCOUNTER
---------------------  From: Judy Higgins LPN (Phone Messages Pool ([id number]))   To: [facility] Message Pool ([address] [place] [id number]);     Sent: 9/2/2020 4:24:25 PM CDT  Subject: General Message     Phone Message    PCP:   AFSHAN      Time of Call:  4:14pm       Person Calling:  pt  Phone number:  [phone]    Note:   Pt LM stating she had left a message before and got a call back from a nurse. Pt is wondering what was found out regarding her diabetic shoes from [facility].    Do you know anything about this? The last phone note was from 7/23 but there was an order for diabetic shoes placed on 8/27/20- no note so am unsure who pt had spoke with.    Last office visit and reason:  7/14/20 phone visit- venous stasis, overactive bladder, tinea---------------------  From: Eliana Sibley MA ([facility] Message Pool ([facility]24[facility]))   To: Richard Conde MD;     Sent: 9/2/2020 4:27:47 PM CDT  Subject: FW: General MessagePer Suze, thinks it was a faxed request from [place] and order was sent to [place]. Thanks---------------------  From: Richard Conde MD   To: Judy Higgins LPN;     Sent: 9/2/2020 4:59:26 PM CDT  Subject: RE: General Message---------------------  From: Judy Higgins LPN   To: Phone Messages Pool ([id number]);     Sent: 9/2/2020 5:03:32 PM CDT  Subject: FW: General MessageCalsheri Peralta- chichi ICD 10 code was listed on Rx and they were waiting on Rx for moldable inserts to be used with diabetic shoes.   Resent Rx with ICD 10 code and sent Rx for moldable inserts.    LM for pt to call back.---------------------  From: Viviane Zhao CMA (Phone Messages Pool ([id number]_[place] [facility] [place]))   To: [facility] Message Pool ([id number]_[facility]);     Sent: 9/3/2020 2:45:27 PM CDT  Subject: FW: General Messagenoted

## 2022-02-15 NOTE — PROGRESS NOTES
Patient:   PAKO BARBER            MRN: [id number]            FIN: [id number]               Age:   79 years     Sex:  Female     :  1940   Associated Diagnoses:   Mixed hyperlipidemia; Urge incontinence; Diabetes mellitus with diabetic neuropathy; EDEMA; Metabolic Syndrome; Ischemic cerebrovascular disease   Author:   Richard Conde MD      Chief Complaint   2019 2:29 PM CDT     DM med check; No feeling in left foot, normal in right foot      History of Present Illness   patient here for diabetes follow up  reviewed recent labs, A1c is good, labs stable, up to date until January  current medication well tolerated  patient checking blood sugars occasionally  has known neuropathy, no changes  ongoing edema, overall furosemide is helping, has been keeping feet up more  looked into electric recliner but found one that she can use herself that allows her to elevate her feet  has been trying to be a little more active         Review of Systems   Constitutional:  Fatigue, Decreased activity, no change from recent.    Respiratory:  Negative.    Cardiovascular:  Negative.    Genitourinary:  chronic urge incontinence, some improvement with medication.    Integumentary:  Negative except as documented in history of present illness.    Psychiatric:  Negative.       Health Status   Allergies:    Allergic Reactions (All)  Severity Not Documented  Latex (No reactions were documented)  Opthmolic eye drops (Swelling of eye..)  Vicodin (Behavioral disturbances)  Zocor (Diarrhea)   Medications:  (Selected)   Prescriptions  Prescribed  Ditropan XL 10 mg/24 hr oral tablet, extended release: = 1 tab(s) ( 10 mg ), PO, Daily, # 90 tab(s), 3 Refill(s), Type: Maintenance, Pharmacy: [facility], 1 tab(s) Oral daily  Lasix 40 mg oral tablet: = 1 tab(s) ( 40 mg ), PO, Daily, # 90 tab(s), 3 Refill(s), Type: Maintenance, Pharmacy: [facility], 1 tab(s) Oral daily  atorvastatin 80 mg oral tablet: = 1 tab(s), Oral,  daily, # 90 tab(s), 3 Refill(s), Type: Maintenance, Pharmacy: [facility] [id number] [facility], 1 tab(s) Oral daily  custom fit diabetic shoes: custom fit diabetic shoes, See Instructions, Instructions: wear daily for heel pain, neuropathy, venous insufficiency, Supply, # 2 EA, 0 Refill(s), Type: Maintenance, Pharmacy: [facility], wear daily for heel pain, neuropathy, venous insufficiency  electric recliner and lift chair: electric recliner and lift chair, See Instructions, Instructions: use for sitting and for elevating legs, Supply, # 1 EA, 0 Refill(s), Type: Maintenance  metFORMIN 500 mg oral tablet: = 1 tab(s) ( 500 mg ), PO, BID, # 180 tab(s), 3 Refill(s), Type: Maintenance, Pharmacy: [facility] [id number] [facility], 1 tab(s) Oral bid  moldable orthotics: moldable orthotics, See Instructions, Instructions: use daily with diabetic shoes, Supply, # 6 EA, 0 Refill(s), Type: Maintenance, Pharmacy: [facility], use daily with diabetic shoes  potassium chloride 20 mEq oral tablet, extended release: = 1 tab(s) ( 20 mEq ), PO, daily, # 90 tab(s), 3 Refill(s), Type: Maintenance, Pharmacy: [facility] [id number] [facility], 1 tab(s) Oral daily  triamcinolone 0.1% topical ointment: 1 jonathan, top, tid, Instructions: apply a thin film  to affected area  Area needs to be dry, # 60 gm, 1 Refill(s), Type: Maintenance, Pharmacy: [id number], 1 jonathan top tid,Instr:apply a thin film; to affected area; Area needs to be dry  Documented Medications  Documented  Aleve 220 mg oral capsule: = 1 cap(s) ( 220 mg ), Oral, q12 hrs, 0 Refill(s), Type: Maintenance  Daily Multiple Vitamins: PO, Daily, 0  Tylenol: PO, PRN: as needed for pain, 0 Refill(s), Type: Maintenance  Vitamin D3 1000 intl units oral capsule: 1 cap(s) ( 1,000 International Unit ), PO, daily, 0 Refill(s), Type: Soft Stop  aspirin 81 mg oral tablet: 1 tab(s) ( 81 mg ), PO, Daily, # 30 tab(s), 0 Refill(s), Type: Maintenance  calcium (as carbonate)-vitamin D 600 mg-125 intl units oral tablet: 1 tab(s),  Oral, tid, 0 Refill(s), Type: Maintenance   Problem list:    All Problems (Selected)  Hyperplastic Colonic Polyp / SNOMED CT 312997653 / Confirmed  Chronic Venous Insufficiency / ICD-9-.81 / Confirmed  CVA, old, cognitive deficits / ICD-9-.0 / Confirmed  Diabetes mellitus with diabetic neuropathy / SNOMED CT 901030267 / Confirmed  EDEMA / ICD-9-.3 / Confirmed  Metabolic Syndrome / ICD-9-.7 / Confirmed  Mixed hyperlipidemia / SNOMED CT 406159073 / Confirmed  OA (Osteoarthritis) / ICD-9-.90 / Confirmed  Obesity / ICD-9-.00 / Probable  Osteoporosis / ICD-9-.00 / Confirmed  Polio / ICD-9-.90 / Confirmed  Diabetes mellitus, type 2 / SNOMED CT 835984559 / Confirmed  Varicose Veins of Legs / ICD-9-.9 / Confirmed      Histories   Past Medical History:    Active  Hyperplastic Colonic Polyp (SNOMED CT 984121733): Onset in 2004 at 64 years.  OA (Osteoarthritis) (ICD-9-.90)  Osteoporosis (ICD-9-.00)  EDEMA (ICD-9-.3)  Metabolic Syndrome (ICD-9-.7)  Polio (ICD-9-.90)  Chronic Venous Insufficiency (ICD-9-.81)  Varicose Veins of Legs (ICD-9-.9)  Resolved  Inpatient stay (SNOMED CT 438943909): Onset on 10/10/2018 at 78 years.  Resolved on 10/13/2018 at 78 years.  Comments:  10/18/2018 CDT 9:42 AM CDT - [place], [place] - Acute and chronic right-sided weakness.  Fracture of Wrist (ICD-9-.00): Onset in 2004 at 63 years.  Resolved.  Comments:  5/8/2013 CDT 11:22 AM Michelle Preston  Left    5/8/2013 CDT 11:25 AM CDT Michelle Hall  Work-related fall.  Buttock Pain (ICD-9-.1): Onset in 2004 at 63 years.  Resolved.  Comments:  5/8/2013 CDT 11:24 AM BOBMichelle Lazar  Work-related fall.  Chronic left buttock pain since then.  Stroke (ICD-9-): Onset on 7/14/2003 at 63 years.  Resolved.  Comments:  5/8/2013 CDT 12:03 PM Michelle Preston  Acute CVA with right-sided weakness.  Tobacco user (ICD-9-.1):   Resolved.  Comments:  5/8/2013 CDT 11:46 AM Michelle Preston  Patient states that she quit in 1989.   Family History:    Heart disease  Mother  Grandmother (M)  Brother  Bladder cancer..  Mother     Procedure history:    Extracapsular cataract extraction and insertion of intraocular lens (226961125) on 9/15/2016 at 76 Years.  Comments:  10/10/2016 1:01 PM BOBT Rachel Bailon  Right.  Extracapsular cataract extraction and insertion of intraocular lens ([id number]) on 9/1/2016 at 76 Years.  Comments:  9/8/2016 2:37 PM BOBT Rachel Bailon  Left.  Colonoscopy (373646518) on 6/25/2014 at 74 Years.  Comments:  6/25/2014 9:16 AM Demond Mccoy MD  Moderate left diberticulosis. No polyps. Repeat 10 years.  Sclerotherapy of vein of lower limb (7717852936) in 2010 at 70 Years.  Comments:  5/8/2013 11:40 AM Michelle Preston  Left leg in 03/2010.  Right leg in 04/2010.  [facility].  Laser ablation of long saphenous vein ([id number]) on 8/3/2009 at 69 Years.  Comments:  5/8/2013 11:45 AM Michelle Preston  Right great and right accessory saphenous veins.  Fluoroscopically-guided needle placement for injection of ischial bursa on the left. on 10/10/2008 at 68 Years.  Comments:  5/8/2013 11:49 AM Michelle Preston  [facility].  Bone density scan (086573140) on 5/31/2007 at 67 Years.  Comments:  5/8/2013 11:58 AM Michelle Preston  Osteopenia  Fracture of Wrist (814.00) in 2004 at 64 Years.  Comments:  5/8/2013 11:54 AM Michelle Preston  Left  Colonoscopy ([id number]) on 4/1/2004 at 63 Years.  Comments:  8/8/2011 7:35 AM Rachel Moralse  Repeat in 10 yrs.    10/4/2010 11:58 AM Demond Mccoy MD  diverticulosis. single hyperplastic rectal polyp  Bone density scan (246899472) in 2002 at 62 Years.  Arthroscopy of knee (071486980) in 2000 at 60 Years.  Comments:  5/8/2013 12:08 PM BOBT - Michelle Zhao  Left knee.  Flexible sigmoidoscopy (96341460) on 7/22/1999 at 59 Years.  Arthroscopy of Knee  (80.26) in 1998 at 58 Years.   section ([id number]).  Comments:  2013 11:52 AM CDT - Michelle Zhao's    2010 12:42 PM CST - Mila Brush   3, Para 3   Social History:        Alcohol Assessment: Denies Alcohol Use            Never      Tobacco Assessment: Past            Past, Cigarettes, 15 per day.  Started age 19 Years.  Stopped age 44 Years.      Substance Abuse Assessment: Denies Substance Abuse      Employment and Education Assessment            Retired      Home and Environment Assessment            Marital status: .  3 children.      Other Assessment            Marital status,       Physical Examination   Vital Signs   2019 2:29 PM CDT Temperature Tympanic 99.0 DegF    Peripheral Pulse Rate 72 bpm    Pulse Site Radial artery    HR Method Manual    Systolic Blood Pressure 114 mmHg    Diastolic Blood Pressure 64 mmHg    Mean Arterial Pressure 81 mmHg    BP Site Left arm    BP Method Manual      Measurements from flowsheet : Measurements   2019 2:29 PM CDT Height Measured - Standard 64 in    Weight Measured - Standard 194.4 lb    BSA 1.99 m2    Body Mass Index 33.37 kg/m2  HI      General:  Alert and oriented, No acute distress.    Eye:  Pupils are equal, round and reactive to light, Normal conjunctiva.    HENT:  Normocephalic, Oral mucosa is moist, No pharyngeal erythema.    Neck:  Supple, Non-tender, No lymphadenopathy.    Respiratory:  Lungs are clear to auscultation.    Cardiovascular:  Normal rate, Regular rhythm, bilateral peripheral edema.    Neurologic:  neuropathy to bilateral feet.       Review / Management   Results review:  Lab results   2019 11:08 AM CDT Hgb A1c 6.7  HI    Cholesterol 158 mg/dL    Non-    HDL 43 mg/dL  LOW    Chol/HDL Ratio 3.7    LDL 81    Triglyceride 243 mg/dL  HI    U Microalbumin 2.1 mg/dL    Microalbumin Comment See comment   .       Impression and Plan   Diagnosis     Mixed hyperlipidemia (WSD90-XP E78.2).     Urge  incontinence (TLN93-YX N39.41).     Diabetes mellitus with diabetic neuropathy (WXH79-WO E11.40).     EDEMA (JIC84-CU R60.9).     Metabolic Syndrome (PWH46-IF E88.81).     Ischemic cerebrovascular disease (LEF17-NX I67.82).     Orders     Orders (Selected)   Outpatient Orders  Ordered  Return to Clinic (Request): RFV: fasting labs: lipid, microalbumin, A1c, Return in 1 year  Return to Clinic (Request): RFV: lab visit: chem 8, A1c, Return in 6 months  Prescriptions  Prescribed  Ditropan XL 10 mg/24 hr oral tablet, extended release: = 1 tab(s) ( 10 mg ), PO, Daily, # 90 tab(s), 3 Refill(s), Type: Maintenance, Pharmacy: [email or web address], 1 tab(s) Oral daily  Lasix 40 mg oral tablet: = 1 tab(s) ( 40 mg ), PO, Daily, # 90 tab(s), 3 Refill(s), Type: Maintenance, Pharmacy: [facility], 1 tab(s) Oral daily  atorvastatin 80 mg oral tablet: = 1 tab(s), Oral, daily, # 90 tab(s), 3 Refill(s), Type: Maintenance, Pharmacy: [facility], 1 tab(s) Oral daily  metFORMIN 500 mg oral tablet: = 1 tab(s) ( 500 mg ), PO, BID, # 180 tab(s), 3 Refill(s), Type: Maintenance, Pharmacy: [facility], 1 tab(s) Oral bid  potassium chloride 20 mEq oral tablet, extended release: = 1 tab(s) ( 20 mEq ), PO, daily, # 90 tab(s), 3 Refill(s), Type: Maintenance, Pharmacy: [facility], 1 tab(s) Oral daily.

## 2022-02-15 NOTE — LETTER
(Inserted Image. Unable to display)   [address] [place]  [address]  [phone]    January 16, 2019      PAKO BARBER      [address]   [place], [place] [id number]        Dear PAKO,    Thank you for selecting [facility] for your healthcare needs. Below you will find the result of your recent test(s) done at our clinic.    Results are stable.  I would like to get you into Physical Therapy.  I will have them contact you. If you don't hear from them by next week, please let me know.      Result Name Current Result Previous Result Reference Range   Sodium Level (mmol/L)  140 1/15/2019  140 9/11/2018 135 - 146   Potassium Level (mmol/L)  4.2 1/15/2019  4.4 9/11/2018 3.5 - 5.3   Chloride Level (mmol/L)  104 1/15/2019  106 9/11/2018 98 - 110   CO2 Level (mmol/L)  27 1/15/2019  27 9/11/2018 20 - 32   Glucose Level (mg/dL) ((H)) 130 1/15/2019 ((H)) 116 9/11/2018 65 - 99   BUN (mg/dL)  21 1/15/2019  15 9/11/2018 7 - 25   Creatinine Level (mg/dL)  0.76 1/15/2019  0.69 9/11/2018 0.60 - 0.93   Calcium Level (mg/dL)  9.4 1/15/2019  9.3 9/11/2018 8.6 - 10.4   Bilirubin Total (mg/dL)  0.8 1/15/2019  0.2 - 1.2   Alkaline Phosphatase (unit/L)  66 1/15/2019  33 - 130   AST/SGOT (unit/L)  33 1/15/2019  10 - 35   ALT/SGPT (unit/L)  29 1/15/2019  6 - 29   Protein Total (gm/dL)  6.7 1/15/2019  6.1 - 8.1   Albumin Level (gm/dL)  4.0 1/15/2019  3.9 6/14/2017 3.6 - 5.1   Globulin  2.7 1/15/2019  1.9 - 3.7   A/G Ratio  1.5 1/15/2019  1.0 - 2.5   Hgb A1c ((H)) 6.8 1/15/2019 ((H)) 6.2 9/11/2018  - <5.7   WBC  6.3 1/15/2019  6.0 6/15/2018 3.8 - 10.8   RBC  4.28 1/15/2019  4.18 6/15/2018 3.80 - 5.10   Hgb (gm/dL)  13.5 1/15/2019  13.5 6/15/2018 11.7 - 15.5   Hct (%)  40.3 1/15/2019  39.2 6/15/2018 35.0 - 45.0   MCV (fL)  94.2 1/15/2019  93.8 6/15/2018 80.0 - 100.0   MCH (pg)  31.5 1/15/2019  32.3 6/15/2018 27.0 - 33.0   MCHC (gm/dL)  33.5 1/15/2019  34.4 6/15/2018 32.0 - 36.0   RDW (%)  12.2  1/15/2019  12.4 6/15/2018 11.0 - 15.0   Platelet  155 1/15/2019  179 6/15/2018 140 - 400   MPV (fL)  10.5 1/15/2019  11.3 6/15/2018 7.5 - 12.5   Lymphocytes (%)  30.3 1/15/2019     Abs Lymphocytes  1,909 1/15/2019  850 - 3,900   Neutrophils (%)  57 1/15/2019     Abs Neutrophils  3,591 1/15/2019  1,500 - 7,800   Monocytes (%)  8.7 1/15/2019     Abs Monocytes  548 1/15/2019  200 - 950   Eosinophils (%)  3.5 1/15/2019     Abs Eosinophils  221 1/15/2019  15 - 500   Basophils (%)  0.5 1/15/2019     Abs Basophils  32 1/15/2019  0 - 200       Please contact my practice at [phone] if you have any questions or concerns.      Sincerely,        Negrito Abdul MD ,   Otilia Conde MD,   Jan York PA-C

## 2022-02-15 NOTE — NURSING NOTE
Hearing and Vision Screening Entered On:  7/2/2019 2:40 PM CDT    Performed On:  7/2/2019 2:40 PM CDT by Eliana Sibley MA               Hearing and Vision Screening   Hearing Screen Comments :   Hearing aids    Eliana Sibley MA - 7/2/2019 2:40 PM CDT

## 2022-02-15 NOTE — TELEPHONE ENCOUNTER
Entered by Negrito Abdul MD on August 19, 2021 3:02:23 PM CDT  ---------------------  From: Negrito Abdul MD   To: [facility]    Sent: 8/19/2021 3:02:23 PM CDT  Subject: Medication Management     ** Submitted: **  Complete:nystatin topical (nystatin 100,000 units/g topical cream)   Signed by Negrito Abdul MD  8/19/2021 8:02:00 PM [facility]    ** Approved **  nystatin topical (nystatin 100,000 unit/gram topical cream)  APPLY TO THE AFFECTED AREA(S) THREE TIMES DAILY  Qty:  30 gm        Days Supply:  20        Refills:  2          Substitutions Allowed     Route To Pharmacy - [facility]               ------------------------------------------  From: [facility]  To: Negrito Abdul MD  Sent: August 19, 2021 2:26:51 PM CDT  Subject: Medication Management  Due: August 20, 2021 11:16:59 AM CDT     ** On Hold Pending Signature **     Dispensed Drug: nystatin topical (nystatin 100,000 units/g topical cream), APPLY TO THE AFFECTED AREA(S) THREE TIMES DAILY  Quantity: 30 gm  Days Supply: 20  Refills: 2  Substitutions Allowed  Notes from Pharmacy:  ------------------------------------------

## 2022-02-15 NOTE — PROGRESS NOTES
Patient:   PAKO BARBER            MRN: [id number]            FIN: [id number]               Age:   79 years     Sex:  Female     :  1940   Associated Diagnoses:   Cellulitis of leg; Allergic conjunctivitis   Author:   Negrito Abdul MD      Visit Information      Date of Service: 2019 10:39 am  Performing Location: [facility]  Encounter#: [id number]      Chief Complaint   Chief complaint and symptoms noted above confirmed with patient.      Subjective   Chief complaint 2019 10:41 AM CDT   Follow up Cellulitis, Legs are better  .  Additional information Been going on for a week.  No F/C/NS Eyes improved as well..        Health Status   Allergies:    Allergic Reactions (Selected)  Severity Not Documented  Latex (No reactions were documented)  Opthmolic eye drops (Swelling of eye..)  Vicodin (Behavioral disturbances)  Zocor (Diarrhea)   Medications:  (Selected)   Prescriptions  Prescribed  Ditropan XL 10 mg/24 hr oral tablet, extended release: = 1 tab(s) ( 10 mg ), PO, Daily, # 90 tab(s), 3 Refill(s), Type: Maintenance, [facility]acy: [facility], 1 tab(s) Oral daily  Lasix 40 mg oral tablet: = 1 tab(s) ( 40 mg ), PO, Daily, # 90 tab(s), 3 Refill(s), Type: Maintenance, Pharmacy: [facility], 1 tab(s) Oral daily  atorvastatin 80 mg oral tablet: = 1 tab(s), Oral, daily, # 90 tab(s), 3 Refill(s), Type: Maintenance, Pharmacy: [facility], 1 tab(s) Oral daily  azelastine 0.05% ophthalmic solution: 1 drop(s), Eye-Both, bid, PRN: for allergy symptoms, # 6 mL, 0 Refill(s), Type: Acute, Pharmacy[id number], 1 drop(s) Eye-Both bid,PRN:for allergy symptoms  cephalexin 500 mg oral capsule: = 1 cap(s) ( 500 mg ), Oral, q8 hrs, x 7 day(s), # 21 cap(s), 0 Refill(s), Type: Acute, Pharmacy: [id number], 1 cap(s) Oral q8 hrs,x7 day(s)  custom fit diabetic shoes: custom fit diabetic shoes, See Instructions, Instructions: wear daily for heel pain, neuropathy,  venous insufficiency, Supply, # 2 EA, 0 Refill(s), Type: Maintenance, Pharmacy: [facility], wear daily for heel pain, neuropathy, venous insufficiency  electric recliner and lift chair: electric recliner and lift chair, See Instructions, Instructions: use for sitting and for elevating legs, Supply, # 1 EA, 0 Refill(s), Type: Maintenance  metFORMIN 500 mg oral tablet: = 1 tab(s) ( 500 mg ), PO, BID, # 180 tab(s), 3 Refill(s), Type: Maintenance, Pharmacy: [facility] [id number] [facility], 1 tab(s) Oral bid  moldable orthotics: moldable orthotics, See Instructions, Instructions: use daily with diabetic shoes, Supply, # 6 EA, 0 Refill(s), Type: Maintenance, Pharmacy: [facility], use daily with diabetic shoes  nystatin 100,000 units/g topical cream: 1 jonathan, TOP, TID, # 30 g, 2 Refill(s), Type: Maintenance, Pharmacy: [facility] [address] [facility], 1 jonathan Topical tid  potassium chloride 20 mEq oral tablet, extended release: = 1 tab(s) ( 20 mEq ), PO, daily, # 90 tab(s), 3 Refill(s), Type: Maintenance, Pharmacy: [facility] [address] [facility], 1 tab(s) Oral daily  triamcinolone 0.1% topical ointment: 1 jonathan, top, tid, Instructions: apply a thin film  to affected area  Area needs to be dry, # 60 gm, 1 Refill(s), Type: Maintenance, Pharmacy: [id number], 1 jonathan top tid,Instr:apply a thin film; to affected area; Area needs to be dry  Documented Medications  Documented  Aleve 220 mg oral capsule: = 1 cap(s) ( 220 mg ), Oral, q12 hrs, 0 Refill(s), Type: Maintenance  Daily Multiple Vitamins: PO, Daily, 0  Tylenol: PO, PRN: as needed for pain, 0 Refill(s), Type: Maintenance  Vitamin D3 1000 intl units oral capsule: 1 cap(s) ( 1,000 International Unit ), PO, daily, 0 Refill(s), Type: Soft Stop  aspirin 81 mg oral tablet: 1 tab(s) ( 81 mg ), PO, Daily, # 30 tab(s), 0 Refill(s), Type: Maintenance  calcium (as carbonate)-vitamin D 600 mg-125 intl units oral tablet: 1 tab(s), Oral, tid, 0 Refill(s), Type: Maintenance   Problem list:    All Problems  (Selected)  Hyperplastic Colonic Polyp / SNOMED CT 936720735 / Confirmed  Chronic Venous Insufficiency / ICD-9-.81 / Confirmed  CVA, old, cognitive deficits / ICD-9-.0 / Confirmed  Diabetes mellitus with diabetic neuropathy / SNOMED CT 747995662 / Confirmed  EDEMA / ICD-9-.3 / Confirmed  Metabolic Syndrome / ICD-9-.7 / Confirmed  Mixed hyperlipidemia / SNOMED CT 507134968 / Confirmed  OA (Osteoarthritis) / ICD-9-.90 / Confirmed  Obesity / ICD-9-.00 / Probable  Osteoporosis / ICD-9-.00 / Confirmed  Polio / ICD-9-.90 / Confirmed  Diabetes mellitus, type 2 / SNOMED CT 113958465 / Confirmed  Urge incontinence / SNOMED CT 339894098 / Confirmed  Varicose Veins of Legs / ICD-9-.9 / Confirmed      Objective   Vital Signs   9/12/2019 10:41 AM CDT Temperature Tympanic 98.3 DegF    Peripheral Pulse Rate 70 bpm    Pulse Site Radial artery    HR Method Manual    Systolic Blood Pressure 126 mmHg    Diastolic Blood Pressure 70 mmHg    Mean Arterial Pressure 89 mmHg    BP Site Left arm    BP Method Manual    Oxygen Saturation 97 %      Measurements from flowsheet : Measurements   9/12/2019 10:41 AM CDT Height Measured - Standard 64 in    Weight Measured - Standard 189.2 lb    BSA 1.97 m2    Body Mass Index 32.47 kg/m2  HI      General:  Alert and oriented, No acute distress.    Eye:  Pupils are equal, round and reactive to light, Extraocular movements are intact, Normal conjunctiva.    HENT:  Normocephalic, Tympanic membranes are clear.    Neck:  Supple, Non-tender.    Respiratory:  Lungs are clear to auscultation.    Cardiovascular:  Normal rate, Regular rhythm.    Integumentary:  Right leg erythematous c/w cellulitis improving but not resolved..       Results Review   Results review   Lab results   6/26/2019 11:08 AM CDT Hgb A1c 6.7  HI    Cholesterol 158 mg/dL    Non-    HDL 43 mg/dL  LOW    Chol/HDL Ratio 3.7    LDL 81    Triglyceride 243 mg/dL  HI    U Microalbumin  2.1 mg/dL    Microalbumin Comment See comment         Impression and Plan   Assessment and Plan:          Diagnosis: Cellulitis of leg (YGO88-KG L03.119).         Course: Not improving.    Orders     Orders (Selected)   Outpatient Orders  Ordered  Referral (Request): 01/16/19 9:47:00 CST, Referred to: Physical Therapy, Referred to: Maribel Christian, Reason for referral: Dizziness. Unsteady gait, Additional instructions: Please call patient to schedule., Dizzy  CVA, old, cognitive deficits  Return to Clinic (Request): RFV: DM med check and labs, Return in 6 months  Return to Clinic (Request): RFV: fasting labs: lipid, microalbumin, A1c, Return in 1 year  Return to Clinic (Request): RFV: lab visit: chem 8, A1c, Return in 6 months  Return to Office (Request): RFV: Colonoscopy in 10 years   Lower Extremity Venous Doppler (Request): Priority: STAT, Instructions: Specify Right, Chronic Venous Insufficiency  Prescriptions  Prescribed  Ditropan XL 10 mg/24 hr oral tablet, extended release: = 1 tab(s) ( 10 mg ), PO, Daily, # 90 tab(s), 3 Refill(s), Type: Maintenance, Pharmacy: [email or web address], 1 tab(s) Oral daily  Lasix 40 mg oral tablet: = 1 tab(s) ( 40 mg ), PO, Daily, # 90 tab(s), 3 Refill(s), Type: Maintenance, Pharmacy: [facility], 1 tab(s) Oral daily  atorvastatin 80 mg oral tablet: = 1 tab(s), Oral, daily, # 90 tab(s), 3 Refill(s), Type: Maintenance, [id number], 1 tab(s) Oral daily  azelastine 0.05% ophthalmic solution: 1 drop(s), Eye-Both, bid, PRN: for allergy symptoms, # 6 mL, 0 Refill(s), Type: Acute, [id number], 1 drop(s) Eye-Both bid,PRN:for allergy symptoms  cephalexin 500 mg oral capsule: = 1 cap(s) ( 500 mg ), Oral, q8 hrs, x 7 day(s), # 21 cap(s), 0 Refill(s), Type: Acute, [id number], 1 cap(s) Oral q8 hrs,x7 day(s)  custom fit diabetic shoes: custom fit diabetic shoes, See Instructions, Instructions: wear daily for heel pain, neuropathy,  venous insufficiency, Supply, # 2 EA, 0 Refill(s), Type: Maintenance, Pharmacy: [facility], wear daily for heel pain, neuropathy, venous insufficiency  electric recliner and lift chair: electric recliner and lift chair, See Instructions, Instructions: use for sitting and for elevating legs, Supply, # 1 EA, 0 Refill(s), Type: Maintenance  metFORMIN 500 mg oral tablet: = 1 tab(s) ( 500 mg ), PO, BID, # 180 tab(s), 3 Refill(s), Type: Maintenance, Pharmacy: [facility] [address] [facility], 1 tab(s) Oral bid  moldable orthotics: moldable orthotics, See Instructions, Instructions: use daily with diabetic shoes, Supply, # 6 EA, 0 Refill(s), Type: Maintenance, Pharmacy: [facility], use daily with diabetic shoes  nystatin 100,000 units/g topical cream: 1 jonathan, TOP, TID, # 30 g, 2 Refill(s), Type: Maintenance, Pharmacy: [facility] [address] [facility], 1 jonathan Topical tid  potassium chloride 20 mEq oral tablet, extended release: = 1 tab(s) ( 20 mEq ), PO, daily, # 90 tab(s), 3 Refill(s), Type: Maintenance, Pharmacy: [facility] [address] [facility], 1 tab(s) Oral daily  triamcinolone 0.1% topical ointment: 1 jonathan, top, tid, Instructions: apply a thin film  to affected area  Area needs to be dry, # 60 gm, 1 Refill(s), Type: Maintenance, Pharmacy: [id number], 1 jonathan top tid,Instr:apply a thin film; to affected area; Area needs to be dry  Documented Medications  Documented  Aleve 220 mg oral capsule: = 1 cap(s) ( 220 mg ), Oral, q12 hrs, 0 Refill(s), Type: Maintenance  Daily Multiple Vitamins: PO, Daily, 0  Tylenol: PO, PRN: as needed for pain, 0 Refill(s), Type: Maintenance  Vitamin D3 1000 intl units oral capsule: 1 cap(s) ( 1,000 International Unit ), PO, daily, 0 Refill(s), Type: Soft Stop  aspirin 81 mg oral tablet: 1 tab(s) ( 81 mg ), PO, Daily, # 30 tab(s), 0 Refill(s), Type: Maintenance  calcium (as carbonate)-vitamin D 600 mg-125 intl units oral tablet: 1 tab(s), Oral, tid, 0 Refill(s), Type: Maintenance.     .    Assessment and Plan:           Diagnosis: Allergic conjunctivitis (HUT63-UG H10.10).         Course: Worsening.    Orders     Orders (Selected)   Prescriptions  Prescribed  cephalexin 500 mg oral capsule: = 1 cap(s) ( 500 mg ), Oral, q8 hrs, x 7 day(s), # 21 cap(s), 0 Refill(s), Type: Acute, Pharmacy: [facility] [id number] [facility], 1 cap(s) Oral q8 hrs,x7 day(s).     .    Assessment and Plan:  Orders     Orders   Requests (Return to Office):  Return to Clinic (Request) (Order): Return in 1 week.     .    Assessment and Plan:  Orders     This is a 15-minute visit with greater than 50% of that time spent on counseling and coordinating care on the above problems.  Counseling included treatment options, diagnostic options and importance of following through along with discussion about referrals.  The patient was interactive, asked questions and verbalized understanding.   .     .

## 2022-02-15 NOTE — PROGRESS NOTES
Patient:   PAKO BARBER            MRN: [id number]            FIN: [id number]               Age:   77 years     Sex:  Female     :  1940   Associated Diagnoses:   Chronic Venous Insufficiency; Pain in left foot   Author:   Jose Gordon MD      Chief Complaint   2017 1:14 PM CDT    2nd opinion regarding bilateral lower leg edema and redness.  Very concerned about sores.  States family has history of gain green.  Need refill of water pills      History of Present Illness   see chief complaint as noted above and confirmed with the patient   77 year old female here to get a second opinion for her bilateral peripheral edema.  Is in wheelchair today due to a flare of her plantar fascitits.  Has been instructed by her primary HCP, Dr. Otilia Conde, to wear compression stockings and elevate legs as much as possible.  Also takes 20mg of Lasix.   Has weeping areas in both lower legs.  Sheets will be wet in the a.m. due to weeping of sores.   Left foot pain.  Was diagnosed with plantar fascittis by Dr. Conde; however, patient tripped today which caused the pain in the foot to increase.     History of polio and stroke which affected the right side primarily.        Review of Systems   Constitutional:  No fever, No chills.    Eye:  No recent visual problem.    Respiratory:  No shortness of breath, No cough.    Cardiovascular:  No chest pain.    Gastrointestinal:  No nausea, No vomiting.    Hematology/Lymphatics:  Swollen extremity.    Immunologic:  No recurrent fevers, No recurrent infections.    Integumentary:  Rash.             Health Status   Allergies:    Allergic Reactions (Selected)  Severity Not Documented  Latex (No reactions were documented)  Opthmolic eye drops (Swelling of eye..)  Vicodin (Behavioral disturbances)  Zocor (Diarrhea)   Medications:  (Selected)   Prescriptions  Prescribed  Ditropan XL 10 mg/24 hr oral tablet, extended release: 1 tab(s) ( 10 mg ), PO, Daily, # 90 tab(s), 0  Refill(s), Type: Maintenance, Pharmacy: [id number], 1 tab(s) po daily  Lasix 20 mg oral tablet: 1 tab(s) ( 20 mg ), PO, Daily, # 30 tab(s), 0 Refill(s), Type: Maintenance, Pharmacy: [id number], 1 tab(s) po daily  Lipitor 80 mg oral tablet: 1 tab(s) ( 80 mg ), PO, Daily, # 90 tab(s), 3 Refill(s), Type: Maintenance, Pharmacy: [id number], 1 tab(s) po daily  MOLDABLE ORTHOTICS MG APPLY: See Instructions, Instructions: USE DAILY WITH DIABETIC SHOES E11.9, # 6 unknown unit, Type: Soft Stop, Pharmacy: [facility], USE DAILY WITH DIABETIC SHOES E11.9  custom fit diabetic shoes: custom fit diabetic shoes, See Instructions, Instructions: wear daily for heel pain, neuropathy, venous insufficiency, Supply, # 2 EA, 0 Refill(s), Type: Maintenance  nystatin 100,000 units/g topical cream: 1 jonathan, TOP, TID, # 30 g, 2 Refill(s), Type: Maintenance, Pharmacy: [id number], 1 jonathan top tid  Documented Medications  Documented  Daily Multiple Vitamins: PO, Daily, 0  Tylenol: PO, PRN: as needed for pain, 0 Refill(s), Type: Maintenance  Vitamin D3 1000 intl units oral capsule: 1 cap(s) ( 1,000 International Unit ), PO, daily, 0 Refill(s), Type: Soft Stop  aspirin 81 mg oral tablet: 1 tab(s) ( 81 mg ), PO, Daily, # 30 tab(s), 0 Refill(s), Type: Maintenance   Problem list:    All Problems  OA (Osteoarthritis) / 715.90 / Confirmed  Osteoporosis / 733.00 / Confirmed  EDEMA / 782.3 / Confirmed  Metabolic Syndrome / 277.7 / Confirmed  Polio / 045.90 / Confirmed  Chronic Venous Insufficiency / 459.81 / Confirmed  Obesity / 278.00 / Probable  Varicose Veins of Legs / 454.9 / Confirmed  Mixed hyperlipidemia / 649360487 / Confirmed  CVA, old, cognitive deficits / 438.0 / Confirmed  Buttock Pain / 729.1 / Confirmed  Hyperplastic Colonic Polyp / 400640957 / Confirmed  Diabetes Mellitus / 250.00 / Confirmed  Inactive: Tear of Medial Meniscus of Knee / 836.0  Inactive: Ischial Bursitis / 726.5  Resolved: Tobacco  user / 305.1  Resolved: Stroke / 436  Resolved: Fracture of Wrist / 814.00  Canceled: Dyslipidemia / 272.4      Histories   Past Medical History:    Active  Diabetes Mellitus (250.00): Onset on 5/1/2007 at 66 years.  Comments:  5/8/2013 CDT 11:12 AM Michelle Preston  Type 2  Hyperplastic Colonic Polyp (397343484): Onset in 2004 at 64 years.  Buttock Pain (729.1): Onset in 2004 at 63 years.  Comments:  5/8/2013 CDT 11:24 AM Michelle Preston  Work-related fall.  Chronic left buttock pain since then.  OA (Osteoarthritis) (715.90)  Osteoporosis (733.00)  EDEMA (782.3)  Metabolic Syndrome (277.7)  Polio (045.90)  Chronic Venous Insufficiency (459.81)  Varicose Veins of Legs (454.9)  Resolved  Fracture of Wrist (814.00): Onset in 2004 at 63 years.  Resolved.  Comments:  5/8/2013 CDT 11:22 AM Michelle Preston  Left    5/8/2013 CDT 11:25 AM CDMichelle Lazar  Work-related fall.  Stroke (436): Onset on 7/14/2003 at 63 years.  Resolved.  Comments:  5/8/2013 CDT 12:03 PM Michelle Preston  Acute CVA with right-sided weakness.  Tobacco user (305.1):  Resolved.  Comments:  5/8/2013 CDT 11:46 AM Michelle Preston  Patient states that she quit in 1989.   Family History:    Heart disease  Mother  Grandmother (M)  Brother  Bladder cancer..  Mother     Procedure history:    Extracapsular cataract extraction and insertion of intraocular lens (SNOMED CT 953243923) on 9/15/2016 at 76 Years.  Comments:  10/10/2016 1:01 PM - Rachel Ivan  Right.  Extracapsular cataract extraction and insertion of intraocular lens (SNOMED CT 111421739) performed by Ethan Somers MD on 9/1/2016 at 76 Years.  Comments:  9/8/2016 2:37 PM - Rachel Ivan  Left.  Colonoscopy (SNOMED CT 969017941) performed by Demond Knight MD on 6/25/2014 at 74 Years.  Comments:  6/25/2014 9:16 AM - Demond Knight MD  Moderate left diberticulosis. No polyps. Repeat 10 years.  Sclerotherapy of vein of lower limb (SNOMED CT 6944357407) in 2010 at 70  Years.  Comments:  2013 11:40 AM - Michelle Zhao  Left leg in 2010.  Right leg in 2010.  [facility].  Laser ablation of long saphenous vein (SNOMED CT 4720237810) on 8/3/2009 at 69 Years.  Comments:  2013 11:45 AM - Michelle Zhao  Right great and right accessory saphenous veins.  Fluoroscopically-guided needle placement for injection of ischial bursa on the left. on 10/10/2008 at 68 Years.  Comments:  2013 11:49 Michelle Kauffman  [facility].  Bone density scan (SNOMED CT 298277866) on 2007 at 67 Years.  Comments:  2013 11:58 AM - Michelle Zhao  Osteopenia  Fracture of Wrist (ICD-9-.00) in  at 64 Years.  Comments:  2013 11:54 AM - Michelle Zhao  Left  Colonoscopy (SNOMED CT 321185332) performed by Beny DAMON Mike on 2004 at 63 Years.  Comments:  2011 7:35 AM - Rachel Ivan  Repeat in 10 yrs.    10/4/2010 11:58 AM - Demond Knight MD  diverticulosis. single hyperplastic rectal polyp  Bone density scan (SNOMED CT 031614716) in  at 62 Years.  Arthroscopy of knee (SNOMED CT 986395338) in  at 60 Years.  Comments:  2013 12:08 PM - Michelle Zhao  Left knee.  Flexible sigmoidoscopy (SNOMED CT 51037003) performed by Franko DAMON [place] on 1999 at 59 Years.  Arthroscopy of Knee (ICD-9-CM 80.26) in  at 58 Years.   section (SNOMED CT 60795738).  Comments:  2013 11:52 AM - Michelle Zhao  1960's    2010 12:42 PM - Gonsalo Mila   3, Para 3   Social History:        Alcohol Assessment: Denies Alcohol Use            Never      Tobacco Assessment: Past            Past, Cigarettes, 15 per day.  Started age 19 Years.  Stopped age 44 Years.      Substance Abuse Assessment: Denies Substance Abuse      Employment and Education Assessment            Retired      Home and Environment Assessment            Marital status: .  3 children.      Other Assessment            Marital status,         Physical Examination   Vital  Signs   6/14/2017 1:14 PM CDT Temperature Tympanic 98 DegF    Peripheral Pulse Rate 64 bpm    Systolic Blood Pressure 110 mmHg    Diastolic Blood Pressure 60 mmHg    Mean Arterial Pressure 77 mmHg      Measurements from flowsheet : Measurements   6/14/2017 1:14 PM CDT Height Measured - Standard 63 in    Weight Measured - Standard 209 lb    BSA 2.05 m2    Body Mass Index 37.02 kg/m2      General:  Alert and oriented, No acute distress.    Eye:  Pupils are equal, round and reactive to light, Normal conjunctiva.    HENT:  Oral mucosa is moist.    Neck:  Supple.    Respiratory:  Respirations are non-labored.    Cardiovascular:  Normal rate, Regular rhythm, No edema.    Gastrointestinal:  Non-distended.    Musculoskeletal:       Mobility/ gait: Able to walk with minimal assistance.         Lower extremity exam: Lower leg ( Bilateral, Erythema, Edema ).    Integumentary:  Warm.         Integumentary exam: Bilateral, Leg, Ankle, Foot, Erythematous, Edema.    Psychiatric:  Cooperative, Appropriate mood & affect, Normal judgment.       Review / Management   Results review   Radiology results   X ray was reviewed and discussed with patient, radiologist read pending.       Impression and Plan   Diagnosis     Chronic Venous Insufficiency (PMW51-EB I87.2).     Pain in left foot (XKN85-ID M79.672).     Course:  Unchanged.    Plan:  Will check some additional labs today.    Discussed importance of compression stockings and elevation.  If unable to find size, try getting a man's size.  Will refer to PT.      Due to increased foot pain and inability to walk into office today, will have xray done of left foot.    No fracture on xray.  Two bone spurs identified.    Given heel cup for left  foot.  .    Orders     Orders (Selected)   Outpatient Orders  Ordered  Physical Therapy (Request): Instructions: dependent edema; status ulcers, Chronic Venous Insufficiency  Ordered (In Transit)  Albumin* ([facility]): Specimen Type: Serum, Collection  Date: 06/14/17 13:46:00 CDT  Basic Metabolic Panel* ([facility]): Specimen Type: Serum, Collection Date: 06/14/17 13:46:00 CDT  TSH* ([facility]): Specimen Type: Serum, Collection Date: 06/14/17 13:46:00 CDT  Prescriptions  Prescribed  Ditropan XL 10 mg/24 hr oral tablet, extended release: 1 tab(s) ( 10 mg ), PO, Daily, # 90 tab(s), 1 Refill(s), Type: Maintenance, Pharmacy: [id number], 1 tab(s) po daily  Lasix 20 mg oral tablet: 1 tab(s) ( 20 mg ), PO, Daily, # 90 tab(s), 0 Refill(s), Type: Maintenance, Pharmacy: [id number], 1 tab(s) po daily.

## 2022-02-15 NOTE — LETTER
(Inserted Image. Unable to display)   [address] [place]  [address]  [phone]    November 11, 2019      PAKO BARBER      [address]   [place], [place] [id number]        Dear PAKO,    Thank you for selecting [facility] for your healthcare needs. Below you will find the result of your recent test(s) done at our clinic.     Your blood counts and chemistries are fine. Your cholesterol is still too high. Please see us to discuss treatment options.      Result Name Current Result Previous Result Reference Range   Sodium Level (mmol/L)  139 11/8/2019  140 1/15/2019 135 - 146   Potassium Level (mmol/L)  4.3 11/8/2019  4.2 1/15/2019 3.5 - 5.3   Chloride Level (mmol/L)  107 11/8/2019  104 1/15/2019 98 - 110   CO2 Level (mmol/L)  22 11/8/2019  27 1/15/2019 20 - 32   Glucose Level (mg/dL)  87 11/8/2019 ((H)) 130 1/15/2019 65 - 99   BUN (mg/dL)  16 11/8/2019  21 1/15/2019 7 - 25   Creatinine Level (mg/dL)  0.75 11/8/2019  0.76 1/15/2019 0.60 - 0.93   Calcium Level (mg/dL)  9.6 11/8/2019  9.4 1/15/2019 8.6 - 10.4   Hgb A1c ((H)) 6.4 11/8/2019 ((H)) 6.7 6/26/2019  - <5.7   Cholesterol (mg/dL) ((H)) 274 11/8/2019  158 6/26/2019  - <200   Non-HDL Cholesterol ((H)) 237 11/8/2019  115 6/26/2019  - <130   HDL (mg/dL) ((L)) 37 11/8/2019 ((L)) 43 6/26/2019 >50 -    Cholesterol/HDL Ratio ((H)) 7.4 11/8/2019  3.7 6/26/2019  - <5.0   LDL  See comment 11/8/2019  81 6/26/2019    LDL Direct (mg/dL) ((H)) 151 11/8/2019   - <100   Triglyceride (mg/dL) ((H)) 559 11/8/2019 ((H)) 243 6/26/2019  - <150   WBC  6.2 11/8/2019  6.3 1/15/2019 3.8 - 10.8   RBC  4.08 11/8/2019  4.28 1/15/2019 3.80 - 5.10   Hgb (gm/dL)  12.8 11/8/2019  13.5 1/15/2019 11.7 - 15.5   Hct (%)  37.8 11/8/2019  40.3 1/15/2019 35.0 - 45.0   MCV (fL)  92.6 11/8/2019  94.2 1/15/2019 80.0 - 100.0   MCH (pg)  31.4 11/8/2019  31.5 1/15/2019 27.0 - 33.0   MCHC (gm/dL)  33.9 11/8/2019  33.5 1/15/2019 32.0 - 36.0   RDW (%)  12.3  11/8/2019  12.2 1/15/2019 11.0 - 15.0   Platelet  174 11/8/2019  155 1/15/2019 140 - 400   MPV (fL)  10.3 11/8/2019  10.5 1/15/2019 7.5 - 12.5   Lymphocytes (%)  33.0 11/8/2019  30.3 1/15/2019    Abs Lymphocytes  2,046 11/8/2019  1,909 1/15/2019 850 - 3,900   Neutrophils (%)  55.1 11/8/2019  57 1/15/2019    Abs Neutrophils  3,416 11/8/2019  3,591 1/15/2019 1,500 - 7,800   Monocytes (%)  8.1 11/8/2019  8.7 1/15/2019    Abs Monocytes  502 11/8/2019  548 1/15/2019 200 - 950   Eosinophils (%)  2.7 11/8/2019  3.5 1/15/2019    Abs Eosinophils  167 11/8/2019  221 1/15/2019 15 - 500   Basophils (%)  1.1 11/8/2019  0.5 1/15/2019    Abs Basophils  68 11/8/2019  32 1/15/2019 0 - 200       Please contact my practice at [phone] if you have any questions or concerns.      Sincerely,        Negrito Abdul MD ,   Otilia Conde MD,   Jan York PA-C

## 2022-02-15 NOTE — PROGRESS NOTES
Patient:   PAKO BARBER            MRN: [id number]            FIN: [id number]               Age:   77 years     Sex:  Female     :  1940   Associated Diagnoses:   Peripheral edema; Mixed hyperlipidemia; Diabetes Mellitus   Author:   Richard Conde MD      Chief Complaint   2017 8:46 AM CST   Demarcus here for DM check. Foot check: Right foot 0/5, Left foot 5/5. Patient is pitted, and red on right lower leg.      History of Present Illness   Patient with hx of diet controlled diabetes, hyperlipidemia, and venous stasis.   Has compression stockings that help. Decreased feeling in right foot.  No issues with medications. Due for refills.       Health Status   Allergies:    Allergic Reactions (All)  Severity Not Documented  Latex (No reactions were documented)  Opthmolic eye drops (Swelling of eye..)  Vicodin (Behavioral disturbances)  Zocor (Diarrhea)   Medications:  (Selected)   Prescriptions  Prescribed  Ditropan XL 10 mg/24 hr oral tablet, extended release: 1 tab(s) ( 10 mg ), PO, Daily, # 90 tab(s), 1 Refill(s), Type: Maintenance, Pharmacy: [facility] #[id number], 1 tab(s) po daily  Lasix 20 mg oral tablet: 1 tab(s) ( 20 mg ), PO, Daily, # 90 tab(s), 1 Refill(s), Type: Maintenance, Pharmacy: [facility] #[id number], 1 tab(s) po daily  MOLDABLE ORTHOTICS MG APPLY: See Instructions, Instructions: USE DAILY WITH DIABETIC SHOES E11.9, # 6 unknown unit, Type: Soft Stop, Pharmacy: [facility], USE DAILY WITH DIABETIC SHOES E11.9  atorvastatin 80 mg oral tablet: 1 tab(s) ( 80 mg ), po, daily, # 90 tab(s), 1 Refill(s), Type: Maintenance, Pharmacy: [facility] #[id number], 1 tab(s) po daily  custom fit diabetic shoes: custom fit diabetic shoes, See Instructions, Instructions: wear daily for heel pain, neuropathy, venous insufficiency, Supply, # 2 EA, 0 Refill(s), Type: Maintenance  triamcinolone 0.1% topical ointment: 1 jonathan, top, tid, Instructions: apply a thin film  to affected area  Area needs to be dry, # 60  gm, 1 Refill(s), Type: Maintenance, Pharmacy: [id number], 1 jonathan top tid,Instr:apply a thin film; to affected area; Area needs to be dry  Documented Medications  Documented  Daily Multiple Vitamins: PO, Daily, 0  Tylenol: PO, PRN: as needed for pain, 0 Refill(s), Type: Maintenance  Vitamin D3 1000 intl units oral capsule: 1 cap(s) ( 1,000 International Unit ), PO, daily, 0 Refill(s), Type: Soft Stop  aspirin 81 mg oral tablet: 1 tab(s) ( 81 mg ), PO, Daily, # 30 tab(s), 0 Refill(s), Type: Maintenance   Problem list:    All Problems (Selected)  Buttock Pain / ICD-9-.1 / Confirmed  Chronic Venous Insufficiency / ICD-9-.81 / Confirmed  CVA, old, cognitive deficits / ICD-9-.0 / Confirmed  Diabetes Mellitus / ICD-9-.00 / Confirmed  EDEMA / ICD-9-.3 / Confirmed  Hyperplastic Colonic Polyp / SNOMED CT 799355985 / Confirmed  Metabolic Syndrome / ICD-9-.7 / Confirmed  Mixed hyperlipidemia / SNOMED CT 659838214 / Confirmed  OA (Osteoarthritis) / ICD-9-.90 / Confirmed  Obesity / ICD-9-.00 / Probable  Osteoporosis / ICD-9-.00 / Confirmed  Polio / ICD-9-.90 / Confirmed  Varicose Veins of Legs / ICD-9-.9 / Confirmed      Histories   Past Medical History:    Active  Diabetes Mellitus (ICD-9-.00): Onset on 5/1/2007 at 66 years.  Comments:  5/8/2013 CDT 11:12 AM CDT Michelle Hall  Type 2  Hyperplastic Colonic Polyp (SNOMED CT 880982400): Onset in 2004 at 64 years.  Buttock Pain (ICD-9-.1): Onset in 2004 at 63 years.  Comments:  5/8/2013 CDT 11:24 AM Michelle Preston  Work-related fall.  Chronic left buttock pain since then.  OA (Osteoarthritis) (ICD-9-.90)  Osteoporosis (ICD-9-.00)  EDEMA (ICD-9-.3)  Metabolic Syndrome (ICD-9-.7)  Polio (ICD-9-.90)  Chronic Venous Insufficiency (ICD-9-.81)  Varicose Veins of Legs (ICD-9-.9)  Resolved  Fracture of Wrist (ICD-9-.00): Onset in 2004 at 63 years.   Resolved.  Comments:  5/8/2013 CDT 11:22 AM CDHAILEY Zhao  Michelle  Left    5/8/2013 CDT 11:25 AM CDMichelle Lazar  Work-related fall.  Stroke (ICD-9-): Onset on 7/14/2003 at 63 years.  Resolved.  Comments:  5/8/2013 CDT 12:03 PM CDT Michelle Hall  Acute CVA with right-sided weakness.  Tobacco user (ICD-9-.1):  Resolved.  Comments:  5/8/2013 CDT 11:46 AM CDMichelle Lazar  Patient states that she quit in 1989.   Family History:    Heart disease  Mother  Grandmother (M)  Brother  Bladder cancer..  Mother     Procedure history:    Extracapsular cataract extraction and insertion of intraocular lens ([id number]) on 9/15/2016 at 76 Years.  Comments:  10/10/2016 1:01 PM - Rachel Ivan  Right.  Extracapsular cataract extraction and insertion of intraocular lens ([id number]) on 9/1/2016 at 76 Years.  Comments:  9/8/2016 2:37 PM - Rachel Ivan  Left.  Colonoscopy ([id number]) on 6/25/2014 at 74 Years.  Comments:  6/25/2014 9:16 AM - Demond Knight MD  Moderate left diberticulosis. No polyps. Repeat 10 years.  Sclerotherapy of vein of lower limb (2070048105) in 2010 at 70 Years.  Comments:  5/8/2013 11:40 Michelle Kuaffman  Left leg in 03/2010.  Right leg in 04/2010.  [facility].  Laser ablation of long saphenous vein ([id number]) on 8/3/2009 at 69 Years.  Comments:  5/8/2013 11:45 Michelle Kauffman  Right great and right accessory saphenous veins.  Fluoroscopically-guided needle placement for injection of ischial bursa on the left. on 10/10/2008 at 68 Years.  Comments:  5/8/2013 11:49 Michelle Kauffman  [facility].  Bone density scan ([id number]) on 5/31/2007 at 67 Years.  Comments:  5/8/2013 11:58 Michelle Kauffman  Osteopenia  Fracture of Wrist (814.00) in 2004 at 64 Years.  Comments:  5/8/2013 11:54 AM - Pavel Michelle  Left  Colonoscopy ([id number]) on 4/1/2004 at 63 Years.  Comments:  8/8/2011 7:35 AM - Rachel Ivan  Repeat in 10 yrs.    10/4/2010 11:58 AM - Antonia DAMON, Demond  diverticulosis.  single hyperplastic rectal polyp  Bone density scan ([id number]) in  at 62 Years.  Arthroscopy of knee ([id number]) in  at 60 Years.  Comments:  2013 12:08 PM - Michelle Zhao  Left knee.  Flexible sigmoidoscopy ([id number]) on 1999 at 59 Years.  Arthroscopy of Knee (80.26) in  at 58 Years.   section ([id number]).  Comments:  2013 11:52 AM - Michelle Zhao  1960's    2010 12:42 PM - Mila Brush   3, Para 3   Social History:        Alcohol Assessment: Denies Alcohol Use            Never      Tobacco Assessment: Past            Past, Cigarettes, 15 per day.  Started age 19 Years.  Stopped age 44 Years.      Substance Abuse Assessment: Denies Substance Abuse      Employment and Education Assessment            Retired      Home and Environment Assessment            Marital status: .  3 children.      Other Assessment            Marital status,         Physical Examination   Vital Signs   2017 8:46 AM CST Peripheral Pulse Rate 80 bpm    Systolic Blood Pressure 116 mmHg    Diastolic Blood Pressure 78 mmHg    Mean Arterial Pressure 91 mmHg    Oxygen Saturation 96 %      Measurements from flowsheet : Measurements   2017 8:46 AM CST Height Measured - Standard 63 in    Weight Measured - Standard 200 lb    BSA 2.01 m2    Body Mass Index 35.42 kg/m2      General:  Alert and oriented, No acute distress.    Eye:  Pupils are equal, round and reactive to light, Normal conjunctiva.    HENT:  Normocephalic, Oral mucosa is moist, No pharyngeal erythema.    Neck:  Supple, Non-tender, No lymphadenopathy.    Respiratory:  Lungs are clear to auscultation.    Cardiovascular:  Normal rate, Regular rhythm, 3+ edema in right leg, 2+ left leg; skin is mildly discolored on right shin, not warm.       Impression and Plan   Diagnosis     Peripheral edema (DED32-CY R60.9).     Mixed hyperlipidemia (GOQ40-DU E78.2).     Diabetes Mellitus (ZAA37-TB E11.9).     Course:  overall stable. Due  for labs and refills..    Orders     Orders (Selected)   Outpatient Orders  Ordered  Return to Clinic (Request): RFV: diabetes visit, Return in 6 months  Ordered (In Transit)  Basic Metabolic Panel* ([facility]): Specimen Type: Serum, Collection Date: 12/11/17 9:02:00 CST  CBC (h/h, RBC, indices, WBC, Plt)* ([facility]): Specimen Type: Blood, Collection Date: 12/11/17 9:02:00 CST  Hemoglobin A1c* ([facility]): Specimen Type: Blood, Collection Date: 12/11/17 9:02:00 CST  Lipid panel with reflex to direct ldl* ([facility]): Specimen Type: Serum, Collection Date: 12/11/17 9:02:00 CST  Prescriptions  Prescribed  Ditropan XL 10 mg/24 hr oral tablet, extended release: 1 tab(s) ( 10 mg ), PO, Daily, # 90 tab(s), 1 Refill(s), Type: Maintenance, Pharmacy: [facility] #[id number], 1 tab(s) po daily  Lasix 20 mg oral tablet: 1 tab(s) ( 20 mg ), PO, Daily, # 90 tab(s), 1 Refill(s), Type: Maintenance, Pharmacy: [facility] #[id number], 1 tab(s) po daily  atorvastatin 80 mg oral tablet: 1 tab(s) ( 80 mg ), po, daily, # 90 tab(s), 1 Refill(s), Type: Maintenance, Pharmacy: [facility] #[id number], 1 tab(s) po daily  triamcinolone 0.1% topical ointment: 1 jonathan, top, tid, Instructions: apply a thin film  to affected area  Area needs to be dry, # 60 gm, 1 Refill(s), Type: Maintenance, Pharmacy: [facility] #[id number], 1 jonathan top tid,Instr:apply a thin film; to affected area; Area needs to be dry.

## 2022-02-15 NOTE — CARE COORDINATION
Patient:   PAKO BARBER            MRN: [id number]            FIN: [id number]               Age:   78 years     Sex:  Female     :  1940   Associated Diagnoses:   None   Author:   Yvonne Phan CMA      Care Coordination Hospital Discharge Note    Sources of Information:  [   ] Patient, family member, or caregiver (Please list):  [ X  ] Hospital discharge summary  See phone note (10/15/18) pt doing well has [facility] coming in, knows about f/u appointments and there were no med changes.   [   ] Hospital fax  [   ] List of recent hospitalizations or ED visits  [   ] Other:   Last Attending: Kang Langston MD    Discharged From: [facility]   Admission Date: 10/10/18  Discharge Date: 10/1318  Discharge Plan: Home with home health care in [place]  Diagnosis/Problem: Acute CVA    Medication Changes: [   ] Yes [ X  ] No   Medication List Updated: [   ] Yes [  X ] No  Hospital Med List at Discharge Reconciled with Current Med List   Medications          *denotes recorded medication          moldable orthotics: See Instructions, use daily with diabetic shoes, 6 EA, 0 Refill(s).          custom fit diabetic shoes: See Instructions, wear daily for heel pain, neuropathy, venous insufficiency, 2 EA, 0 Refill(s).          *Tylenol: PO, PRN: as needed for pain.          *aspirin 81 mg oral tablet: 81 mg, 1 tab(s), PO, Daily, 30 tab(s).          atorvastatin 80 mg oral tablet: 80 mg, 1 tab(s), po, daily, 90 tab(s), 3 Refill(s).          *Vitamin D3 1000 intl units oral capsule: 1,000 International Unit, 1 cap(s), PO, daily.          Lasix 40 mg oral tablet: 40 mg, 1 tab(s), PO, Daily, 90 tab(s), 3 Refill(s).          metFORMIN 500 mg oral tablet: 500 mg, 1 tab(s), PO, BID, 180 tab(s), 3 Refill(s).          *Daily Multiple Vitamins: PO, Daily.          Ditropan XL 10 mg/24 hr oral tablet, extended release: 10 mg, 1 tab(s), PO, Daily, 90 tab(s), 3 Refill(s).          potassium chloride 20 mEq oral tablet,  extended release: 20 mEq, 1 tab(s), PO, daily, 90 tab(s), 3 Refill(s).          triamcinolone 0.1% topical ointment: 1 jonathan, top, tid, apply a thin film  to affected area  Area needs to be dry, 60 gm, 1 Refill(s).    Needs Referral or Lab: [  X ] Yes: Please check her platelets they were low.  Outpatient Provider Recommendations:   Please check her platelets they were low  Needs Follow-up Appointment:  [X ] Within 1-5 days of discharge (highly complex visit)  [   ] Within 14 days of discharge (moderately complex visit)    Appointment Made With:  AFSHAN  Date: 10/18/18    Additional Information Needed and Requested:  [   ] Yes:  [X ] No

## 2022-02-15 NOTE — TELEPHONE ENCOUNTER
---------------------  From: Kusum Alexander CMA ([facility]lyhl [id number]   To: Richard Conde MD;     Sent: 5/3/2019 3:39:20 PM CDT  Subject: Phone Message : Lift Chair     PCP:   AFSHAN      Time of Call:  2:12pm       Person Calling:  Pat  Phone number:  [phone]  Leave a detailed Message:     Returned call at: 3:37pm    Note:   patient is requesting a lift chair recliner. She is wondering if AFSHAN is able to write a prescription for this.     Last office visit and reason:  4/12/19     Pharmacy:     FWD to: [facility] if Medicare will cover it, but would need a clinic visit to discuss symptoms in a face-to-face visit.---------------------  From: Richard Conde MD   To: Phone [employer] [id number]_[place] - [place]);     Sent: 5/6/2019 7:56:47 AM CDT  Subject: RE: Phone Message : Lift ChairReturned Call  Time: 8:47 am  Note:  Called & discussed with patient that she should check with her insurance on coverage and where they suggest getting the chair and what is needed for them to cover it. She will call them and get this information. If it is still possible she will schedule an apt with AFSHAN.

## 2022-02-15 NOTE — NURSING NOTE
Comprehensive Intake Entered On:  8/16/2019 1:35 PM CDT    Performed On:  8/16/2019 1:29 PM CDT by Kusum Alexander CMA               Summary   Chief Complaint :   fell about 4 days ago, legs have water blisters, bandages were pink in color, daughter would like a check up    Advance Directive :   Yes   Weight Measured :   193.2 lb(Converted to: 193 lb 3 oz, 87.63 kg)    Height Measured :   64 in(Converted to: 5 ft 4 in, 162.56 cm)    Body Mass Index :   33.16 kg/m2 (HI)    Body Surface Area :   1.99 m2   Systolic Blood Pressure :   126 mmHg   Diastolic Blood Pressure :   60 mmHg   Mean Arterial Pressure :   82 mmHg   Peripheral Pulse Rate :   73 bpm   BP Method :   Manual   HR Method :   Electronic   Oxygen Saturation :   98 %   Kusum Alexander CMA - 8/16/2019 1:29 PM CDT   Health Status   Allergies Verified? :   Yes   Medication History Verified? :   Yes   Immunizations Current :   Yes   Medical History Verified? :   Yes   Pre-Visit Planning Status :   Completed   Tobacco Use? :   Former smoker   Kusum Alexander CMA - 8/16/2019 1:29 PM CDT   Consents   Consent for Immunization Exchange :   Consent Granted   Consent for Immunizations to Providers :   Consent Granted   Kusum Alexander CMA - 8/16/2019 1:29 PM CDT   Meds / Allergies   (As Of: 8/16/2019 1:35:33 PM CDT)   Allergies (Active)   Latex  Estimated Onset Date:   Unspecified ; Created By:   Ramila Mayes CMA; Reaction Status:   Active ; Category:   Environment ; Substance:   Latex ; Type:   Allergy ; Updated By:   Ramila Mayes CMA; Reviewed Date:   8/16/2019 1:34 PM CDT      opthmolic eye drops  Estimated Onset Date:   Unspecified ; Reactions:   Swelling of eye.. ; Comments:     Comment 1: Gentamycin opthalmic   ; Created By:   Michelle Zhao; Reaction Status:   Active ; Category:   Drug ; Substance:   opthmolic eye drops ; Type:   Allergy ; Updated By:   Michelle Zhao; Source:   Paper Chart ; Reviewed Date:   8/16/2019 1:34 PM CDT      Vicodin  Estimated Onset Date:    Unspecified ; Reactions:   behavioral disturbances ; Created By:   Elyse Anne RN; Reaction Status:   Active ; Category:   Drug ; Substance:   Vicodin ; Type:   Allergy ; Updated By:   Elyse Anne RN; Reviewed Date:   8/16/2019 1:34 PM CDT      Zocor  Estimated Onset Date:   Unspecified ; Reactions:   Diarrhea ; Created By:   Michelle Zhao; Reaction Status:   Active ; Category:   Drug ; Substance:   Zocor ; Type:   Allergy ; Updated By:   Michelle Zhao; Reviewed Date:   8/16/2019 1:34 PM CDT        Medication List   (As Of: 8/16/2019 1:35:33 PM CDT)   Prescription/Discharge Order    atorvastatin  :   atorvastatin ; Status:   Prescribed ; Ordered As Mnemonic:   atorvastatin 80 mg oral tablet ; Simple Display Line:   1 tab(s), Oral, daily, 90 tab(s), 3 Refill(s) ; Ordering Provider:   Richard Conde MD; Catalog Code:   atorvastatin ; Order Dt/Tm:   7/2/2019 2:49:11 PM          furosemide  :   furosemide ; Status:   Prescribed ; Ordered As Mnemonic:   Lasix 40 mg oral tablet ; Simple Display Line:   40 mg, 1 tab(s), PO, Daily, 90 tab(s), 3 Refill(s) ; Ordering Provider:   Richard Conde MD; Catalog Code:   furosemide ; Order Dt/Tm:   7/2/2019 2:49:10 PM          metFORMIN  :   metFORMIN ; Status:   Prescribed ; Ordered As Mnemonic:   metFORMIN 500 mg oral tablet ; Simple Display Line:   500 mg, 1 tab(s), PO, BID, 180 tab(s), 3 Refill(s) ; Ordering Provider:   Richard Conde MD; Catalog Code:   metFORMIN ; Order Dt/Tm:   7/2/2019 2:49:11 PM          Miscellaneous Prescription  :   Miscellaneous Prescription ; Status:   Prescribed ; Ordered As Mnemonic:   moldable orthotics ; Simple Display Line:   See Instructions, use daily with diabetic shoes, 6 EA, 0 Refill(s) ; Ordering Provider:   Richard Conde MD; Catalog Code:   Miscellaneous Prescription ; Order Dt/Tm:   9/18/2018 1:29:55 PM          Miscellaneous Rx Supply  :   Miscellaneous Rx Supply ; Status:   Prescribed ; Ordered As Mnemonic:   custom fit  diabetic shoes ; Simple Display Line:   See Instructions, wear daily for heel pain, neuropathy, venous insufficiency, 2 EA, 0 Refill(s) ; Ordering Provider:   Richard Conde MD; Catalog Code:   Miscellaneous Rx Supply ; Order Dt/Tm:   9/18/2018 1:29:36 PM          Miscellaneous Rx Supply  :   Miscellaneous Rx Supply ; Status:   Prescribed ; Ordered As Mnemonic:   electric recliner and lift chair ; Simple Display Line:   See Instructions, use for sitting and for elevating legs, 1 EA, 0 Refill(s) ; Ordering Provider:   Richard Conde MD; Catalog Code:   Miscellaneous Rx Supply ; Order Dt/Tm:   5/9/2019 11:15:47 AM          oxybutynin  :   oxybutynin ; Status:   Prescribed ; Ordered As Mnemonic:   Ditropan XL 10 mg/24 hr oral tablet, extended release ; Simple Display Line:   10 mg, 1 tab(s), PO, Daily, 90 tab(s), 3 Refill(s) ; Ordering Provider:   Richard Conde MD; Catalog Code:   oxybutynin ; Order Dt/Tm:   7/2/2019 2:49:09 PM          potassium chloride  :   potassium chloride ; Status:   Prescribed ; Ordered As Mnemonic:   potassium chloride 20 mEq oral tablet, extended release ; Simple Display Line:   20 mEq, 1 tab(s), PO, daily, 90 tab(s), 3 Refill(s) ; Ordering Provider:   Richard Conde MD; Catalog Code:   potassium chloride ; Order Dt/Tm:   7/2/2019 2:49:10 PM          triamcinolone topical  :   triamcinolone topical ; Status:   Prescribed ; Ordered As Mnemonic:   triamcinolone 0.1% topical ointment ; Simple Display Line:   1 jonathan, top, tid, apply a thin film  to affected area  Area needs to be dry, 60 gm, 1 Refill(s) ; Ordering Provider:   Richard Conde MD; Catalog Code:   triamcinolone topical ; Order Dt/Tm:   12/11/2017 9:03:44 AM            Home Meds    acetaminophen  :   acetaminophen ; Status:   Documented ; Ordered As Mnemonic:   Tylenol ; Simple Display Line:   PO, PRN: as needed for pain ; Catalog Code:   acetaminophen ; Order Dt/Tm:   1/15/2010 1:47:50 PM          aspirin  :   aspirin ;  Status:   Documented ; Ordered As Mnemonic:   aspirin 81 mg oral tablet ; Simple Display Line:   81 mg, 1 tab(s), PO, Daily, 30 tab(s) ; Catalog Code:   aspirin ; Order Dt/Tm:   3/22/2012 10:42:39 AM          calcium-vitamin D  :   calcium-vitamin D ; Status:   Documented ; Ordered As Mnemonic:   calcium (as carbonate)-vitamin D 600 mg-125 intl units oral tablet ; Simple Display Line:   1 tab(s), Oral, tid, 0 Refill(s) ; Catalog Code:   calcium-vitamin D ; Order Dt/Tm:   3/12/2019 11:50:47 AM          cholecalciferol  :   cholecalciferol ; Status:   Documented ; Ordered As Mnemonic:   Vitamin D3 1000 intl units oral capsule ; Simple Display Line:   1,000 International Unit, 1 cap(s), PO, daily ; Catalog Code:   cholecalciferol ; Order Dt/Tm:   4/16/2012 11:05:56 AM          multivitamin  :   multivitamin ; Status:   Documented ; Ordered As Mnemonic:   Daily Multiple Vitamins ; Simple Display Line:   PO, Daily ; Catalog Code:   multivitamin ; Order Dt/Tm:   1/15/2010 1:47:00 PM          naproxen  :   naproxen ; Status:   Documented ; Ordered As Mnemonic:   Aleve 220 mg oral capsule ; Simple Display Line:   220 mg, 1 cap(s), Oral, q12 hrs, 0 Refill(s) ; Catalog Code:   naproxen ; Order Dt/Tm:   3/12/2019 11:51:40 AM

## 2022-02-15 NOTE — PROGRESS NOTES
Patient:   PAKO BARBER            MRN: [id number]            FIN: [id number]               Age:   76 years     Sex:  Female     :  1940   Associated Diagnoses:   Diabetes Mellitus; Chronic Venous Insufficiency; Pain of left heel; Plantar fasciitis, left   Author:   Richard Conde MD      Chief Complaint   2017 1:43 PM CDT     Pt. presents with [B] LE edema. L heel pain. Pain continuing since last visit. Foot exam-R foot-, L foot-3/5. Skin taut,reddened.      History of Present Illness   Patient with ongoing peripheral edema. Seen last week. Trial of furosemide. No significant change. Reports she does have compression stockings at home that her children bought for her. Wears them most days. Elevation is helping. Reviewed labs - normal except slightly low platelets. No shortness of breath or chest pain.  Still having heel pain. Reports it feels like a pebble is in the center of her left heel. Hurts to stand on left heel, worst when first walking.  Has noticed increased difficulty standing from a chair. Has a 4 point walker gifted to her by a friend. That is helping some. No falls.       Review of Systems   Constitutional:  Decreased activity.    Respiratory:  Negative.    Cardiovascular:  Negative except as documented in history of present illness.    Genitourinary:  Negative.    Psychiatric:  Negative.       Health Status   Allergies:    Allergic Reactions (All)  Severity Not Documented  Latex (No reactions were documented)  Opthmolic eye drops (Swelling of eye..)  Vicodin (Behavioral disturbances)  Zocor (Diarrhea)   Medications:  (Selected)   Prescriptions  Prescribed  Ditropan XL 10 mg/24 hr oral tablet, extended release: 1 tab(s) ( 10 mg ), PO, Daily, # 90 tab(s), 0 Refill(s), Type: Maintenance, Pharmacy: [id number], 1 tab(s) po daily  Lasix 20 mg oral tablet: 1 tab(s) ( 20 mg ), PO, Daily, # 30 tab(s), 0 Refill(s), Type: Maintenance, Pharmacy: [id number], 1 tab(s) po  daily  Lipitor 80 mg oral tablet: 1 tab(s) ( 80 mg ), PO, Daily, # 90 tab(s), 3 Refill(s), Type: Maintenance, Pharmacy: [id number], 1 tab(s) po daily  nystatin 100,000 units/g topical cream: 1 jonathan, TOP, TID, # 30 g, 2 Refill(s), Type: Maintenance, Pharmacy: [id number], 1 jonathan top tid  Documented Medications  Documented  Daily Multiple Vitamins: PO, Daily, 0  Tylenol: PO, PRN: as needed for pain, 0 Refill(s), Type: Maintenance  Vitamin D3 1000 intl units oral capsule: 1 cap(s) ( 1,000 International Unit ), PO, daily, 0 Refill(s), Type: Soft Stop  aspirin 81 mg oral tablet: 1 tab(s) ( 81 mg ), PO, Daily, # 30 tab(s), 0 Refill(s), Type: Maintenance   Problem list:    All Problems (Selected)  Varicose Veins of Legs / ICD-9-.9 / Confirmed  Polio / ICD-9-.90 / Confirmed  Osteoporosis / ICD-9-.00 / Confirmed  Obesity / ICD-9-.00 / Probable  OA (Osteoarthritis) / ICD-9-.90 / Confirmed  Mixed hyperlipidemia / SNOMED CT 991660126 / Confirmed  Metabolic Syndrome / ICD-9-.7 / Confirmed  EDEMA / ICD-9-.3 / Confirmed  Diabetes Mellitus / ICD-9-.00 / Confirmed  CVA, old, cognitive deficits / ICD-9-.0 / Confirmed  Chronic Venous Insufficiency / ICD-9-.81 / Confirmed  Buttock Pain / ICD-9-.1 / Confirmed  Hyperplastic Colonic Polyp / SNOMED CT 344184971 / Confirmed      Histories   Family History:    Heart disease  Mother  Grandmother (M)  Brother  Bladder cancer..  Mother     Procedure history:    Extracapsular cataract extraction and insertion of intraocular lens ([id number]) on 9/15/2016 at 76 Years.  Comments:  10/10/2016 1:01 PM - Rachel Ivan.  Extracapsular cataract extraction and insertion of intraocular lens ([id number]) on 9/1/2016 at 76 Years.  Comments:  9/8/2016 2:37 PM - Rachel Ivan.  Colonoscopy ([id number]) on 6/25/2014 at 74 Years.  Comments:  6/25/2014 9:16 AM - Antonia DAMON, [place]ond [place] left diberticulosis. No  polyps. Repeat 10 years.  Sclerotherapy of vein of lower limb ([id number]) in 2010 at 70 Years.  Comments:  2013 11:40 AM - Michelle Zhao  Left leg in 2010.  Right leg in 2010.  [facility].  Laser ablation of long saphenous vein ([id number]) on 8/3/2009 at 69 Years.  Comments:  2013 11:45 AM - Michelle Zhao  Right great and right accessory saphenous veins.  Fluoroscopically-guided needle placement for injection of ischial bursa on the left. on 10/10/2008 at 68 Years.  Comments:  2013 11:49 AM Michelle Hall  [facility].  Bone density scan (887609309) on 2007 at 67 Years.  Comments:  2013 11:58 Michelle Kauffman  Osteopenia  Fracture of Wrist (814.00) in  at 64 Years.  Comments:  2013 11:54 AM Michelle Hall  Left  Colonoscopy ([id number]) on 2004 at 63 Years.  Comments:  2011 7:35 AM - Rachel Ivan  Repeat in 10 yrs.    10/4/2010 11:58 AM - Antonia DAMON, Demond  diverticulosis. single hyperplastic rectal polyp  Bone density scan ([id number]) in  at 62 Years.  Arthroscopy of knee (608761929) in  at 60 Years.  Comments:  2013 12:08 PM - Michelle Zhao  Left knee.  Flexible sigmoidoscopy (12764662) on 1999 at 59 Years.  Arthroscopy of Knee (80.26) in  at 58 Years.   section ([id number]).  Comments:  2013 11:52 AM - Michelle Zhao  1960's    2010 12:42 PM - Mila Brush   3, Para 3   Social History:        Alcohol Assessment: Denies Alcohol Use            Never      Tobacco Assessment: Past            Past, Cigarettes, 15 per day.  Started age 19 Years.  Stopped age 44 Years.      Substance Abuse Assessment: Denies Substance Abuse      Employment and Education Assessment            Retired      Home and Environment Assessment            Marital status: .  3 children.      Other Assessment            Marital status,         Physical Examination   Vital Signs   2017 1:43 PM CDT Temperature Tympanic 98.3 DegF     Peripheral Pulse Rate 80 bpm    Pulse Site Radial artery    HR Method Manual    Systolic Blood Pressure 126 mmHg    Diastolic Blood Pressure 64 mmHg    Mean Arterial Pressure 85 mmHg    BP Site Left arm    BP Method Manual      Measurements from flowsheet : Measurements   5/2/2017 1:43 PM CDT Height Measured - Standard 63 in    Weight Measured - Standard 205.25 lb    BSA 2.03 m2    Body Mass Index 36.35 kg/m2      General:  Alert and oriented, No acute distress.    Respiratory:  Lungs are clear to auscultation, Respirations are non-labored.    Cardiovascular:  Normal rate, Regular rhythm, peripheral edema is 3+ pitting edema, both legs, ankles to mid shin, venous stasis changes, no significant redness or warmth..    Integumentary:  Warm, Dry, Pink, mild venous stasis changes noted from midshin down.       Review / Management   Results review:  Lab results   4/28/2017 1:25 PM CDT Sodium Level 138 mmol/L    Potassium Level 3.9 mmol/L    Chloride Level 107 mmol/L    CO2 Level 21 mmol/L    Glucose Level 184 mg/dL  HI    BUN 15 mg/dL    Creatinine 0.70 mg/dL    BUN/Creat Ratio NOT APPLICABLE    eGFR 85 mL/min/1.73m2    eGFR African American 99 mL/min/1.73m2    Calcium Level 9.0 mg/dL    Hgb A1c 7.2  HI    WBC 5.1    RBC 4.06    Hgb 12.8 gm/dL    Hct 38.0 %    MCV 93.6 fL    MCH 31.5 pg    MCHC 33.7 gm/dL    RDW 13.2 %    Platelet 132  LOW    MPV 8.9 fL   2/2/2017 9:05 AM CST Sodium Level 139 mmol/L    Potassium Level 4.4 mmol/L    Chloride Level 108 mmol/L    CO2 Level 22 mmol/L    Glucose Level 147 mg/dL  HI    BUN 17 mg/dL    Creatinine 0.74 mg/dL    BUN/Creat Ratio NOT APPLICABLE    eGFR 80 mL/min/1.73m2    eGFR African American 92 mL/min/1.73m2    Calcium Level 9.0 mg/dL    Hgb A1c 7.1  HI    Cholesterol 268 mg/dL  HI    Non-  HI    HDL 44 mg/dL  LOW    Chol/HDL Ratio 6.1  HI      HI    Triglyceride 298 mg/dL  HI    U Microalbumin 3.4 mg/dL    Ur Creatinine 199 mg/dL    Ur Microalb/Creat Ratio 17   .     Radiology results   X-ray, x-ray reviewed by me, no fracture or bony abnormality, normal x-ray, radiology will overread. Results discussed with patient. I will contact with any additional findings once read by radiologist.      Impression and Plan   Diagnosis     Diabetes Mellitus (MDC44-SP E11.9).     Course:  Well controlled.    Plan:  On statin, A1c 7.2. Not on medication. Recheck labs in 6 months. Plan medication if A1c 7.5 or higher. Continue diet. Given vascular issues, edema, recommend getting fitted for diabetic shoes.    Orders     Orders (Selected)   Prescriptions  Prescribed  custom fit diabetic shoes: custom fit diabetic shoes, See Instructions, Instructions: wear daily for heel pain, neuropathy, venous insufficiency, Supply, # 2 EA, 0 Refill(s), Type: Maintenance.     Diagnosis     Chronic Venous Insufficiency (CQZ45-QT I87.2).     Plan:  Should wear compression stockings daily. Consider getting fitted for knee high compression stockings. Will let me know if she needs a prescription. .    Diagnosis     Pain of left heel (UXS52-VG M79.672).     Plantar fasciitis, left (NBO44-QT M72.2).     Plan:  Suspect pain is related to poorly fitted left shoe. Right foot is bigger so has bought shoes to fit right foot and shoe is uncomfortable on left foot. Will try getting fitted for custom shoes given diabetes and vascular issues..

## 2022-02-15 NOTE — TELEPHONE ENCOUNTER
Entered by Nabila Oquendo on January 22, 2021 4:05:49 PM CST  ---------------------  From: Nabila Oquendo   To: [facility]    Sent: 1/22/2021 4:05:49 PM CST  Subject: Medication Management     ** Submitted: **  Order:atorvastatin (atorvastatin 80 mg oral tablet)  1 tab(s)  Oral  daily  Qty:  90 tab(s)        Days Supply:  90        Refills:  0          Substitutions Allowed     Route To Pharmacy - [facility]    Signed by Nabila Oquendo  1/22/2021 10:05:00 PM UT    ** Submitted: **  Complete:atorvastatin (atorvastatin 80 mg oral tablet)   Signed by Nabila Oquendo  1/22/2021 10:05:00 PM UT    ** Not Approved:  **  atorvastatin (atorvastatin 80 mg tablet)  TAKE ONE TABLET BY MOUTH DAILY  Qty:  90 tab(s)        Days Supply:  90        Refills:  3          Substitutions Allowed     Route To Pharmacy - [facility]   Note from Pharmacy:  pt is requesting a new rx please  Signed by Nabila Oquendo          lipid panel and med check done 10/28/20. Per protocol, will fill x6mo.       ------------------------------------------  From: [facility]  To: Negrito Abdul MD  Sent: January 22, 2021 11:20:23 AM CST  Subject: Medication Management  Due: January 22, 2021 9:58:09 AM CST     ** On Hold Pending Signature **     Drug: atorvastatin (atorvastatin 80 mg oral tablet), 1 tab(s) Oral daily  Quantity: 90 tab(s)  Days Supply: 0  Refills: 2  Substitutions Allowed  Notes from Pharmacy:     Dispensed Drug: atorvastatin (atorvastatin 80 mg oral tablet), TAKE ONE TABLET BY MOUTH DAILY  Quantity: 90 tab(s)  Days Supply: 90  Refills: 3  Substitutions Allowed  Notes from Pharmacy: pt is requesting a new rx please  ------------------------------------------

## 2022-02-15 NOTE — NURSING NOTE
Comprehensive Intake Entered On:  2019 8:54 AM CDT    Performed On:  2019 8:50 AM CDT by Elin Webster CMA               Summary   Chief Complaint :   c/o Cough worsening, requesting chest Xray   Advance Directive :   Yes   Height Measured :   64 in(Converted to: 5 ft 4 in, 162.56 cm)    Systolic Blood Pressure :   122 mmHg   Diastolic Blood Pressure :   70 mmHg   Mean Arterial Pressure :   87 mmHg   Peripheral Pulse Rate :   68 bpm   BP Site :   Left arm   BP Method :   Manual   HR Method :   Electronic   Temperature Tympanic :   98.0 DegF(Converted to: 36.7 DegC)    Oxygen Saturation :   95 %   Elin Webster CMA - 2019 8:50 AM CDT   Health Status   Allergies Verified? :   Yes   Medication History Verified? :   Yes   Immunizations Current :   Yes   Medical History Verified? :   Yes   Tobacco Use? :   Former smoker   Elin Webster CMA - 2019 8:50 AM CDT   Demographics   Last Name :   Greer   Address :   [address]   Home Phone Number :   [phone]   First Name :   Arianne Barry Initial :   L   Responsible Party Date of Birth () :   1940 CDT   City :   [place]   State :   [place]   Zip Code :   [address]   Elin Webster CMA - 2019 8:50 AM CDT   Providers Grid   Provider Name :    Dr. Gio Acharya        Provider Specialty :    Dentist   Dental surgeon   Eye care        Comments :    Suzie Mendozasworth          Elin Webster CMA - 2019 8:50 AM CDT  Elin Webster CMA - 2019 8:50 AM CDT  Elin Webster CMA - 2019 8:50 AM CDT       Consents   Consent for Immunization Exchange :   Consent Granted   Consent for Immunizations to Providers :   Consent Granted   Elin Webster CMA - 2019 8:50 AM CDT   Meds / Allergies   (As Of: 2019 8:54:11 AM CDT)   Allergies (Active)   Latex  Estimated Onset Date:   Unspecified ; Created By:   Ramila Mayes CMA; Reaction Status:   Active ; Category:   Environment ; Substance:   Latex ; Type:   Allergy ;  Updated By:   Ramila Mayes CMA; Reviewed Date:   4/12/2019 8:50 AM CDT      opthmolic eye drops  Estimated Onset Date:   Unspecified ; Reactions:   Swelling of eye.. ; Comments:     Comment 1: Gentamycin opthalmic   ; Created By:   Michelle Zhao; Reaction Status:   Active ; Category:   Drug ; Substance:   opthmolic eye drops ; Type:   Allergy ; Updated By:   Michelle Zhao; Source:   Paper Chart ; Reviewed Date:   4/12/2019 8:50 AM CDT      Vicodin  Estimated Onset Date:   Unspecified ; Reactions:   behavioral disturbances ; Created By:   Elyse Anne RN; Reaction Status:   Active ; Category:   Drug ; Substance:   Vicodin ; Type:   Allergy ; Updated By:   Elyse Anne RN; Reviewed Date:   4/12/2019 8:50 AM CDT      Zocor  Estimated Onset Date:   Unspecified ; Reactions:   Diarrhea ; Created By:   Michelle Zhao; Reaction Status:   Active ; Category:   Drug ; Substance:   Zocor ; Type:   Allergy ; Updated By:   Michelle Zhao; Reviewed Date:   4/12/2019 8:50 AM CDT        Medication List   (As Of: 4/12/2019 8:54:11 AM CDT)   Prescription/Discharge Order    amoxicillin  :   amoxicillin ; Status:   Prescribed ; Ordered As Mnemonic:   amoxicillin 875 mg oral tablet ; Simple Display Line:   875 mg, 1 tab(s), PO, BID, for 10 day(s), 20 tab(s), 0 Refill(s) ; Ordering Provider:   Richard Conde MD; Catalog Code:   amoxicillin ; Order Dt/Tm:   4/4/2019 1:19:09 PM          atorvastatin  :   atorvastatin ; Status:   Prescribed ; Ordered As Mnemonic:   atorvastatin 80 mg oral tablet ; Simple Display Line:   80 mg, 1 tab(s), po, daily, 90 tab(s), 3 Refill(s) ; Ordering Provider:   Richard Conde MD; Catalog Code:   atorvastatin ; Order Dt/Tm:   9/18/2018 1:25:04 PM          furosemide  :   furosemide ; Status:   Prescribed ; Ordered As Mnemonic:   Lasix 40 mg oral tablet ; Simple Display Line:   40 mg, 1 tab(s), PO, Daily, 90 tab(s), 3 Refill(s) ; Ordering Provider:   Richard Conde MD; Catalog Code:   furosemide ; Order  Dt/Tm:   9/18/2018 1:25:05 PM          metFORMIN  :   metFORMIN ; Status:   Prescribed ; Ordered As Mnemonic:   metFORMIN 500 mg oral tablet ; Simple Display Line:   500 mg, 1 tab(s), PO, BID, 180 tab(s), 3 Refill(s) ; Ordering Provider:   Richard Conde MD; Catalog Code:   metFORMIN ; Order Dt/Tm:   9/18/2018 1:25:08 PM          Miscellaneous Prescription  :   Miscellaneous Prescription ; Status:   Prescribed ; Ordered As Mnemonic:   moldable orthotics ; Simple Display Line:   See Instructions, use daily with diabetic shoes, 6 EA, 0 Refill(s) ; Ordering Provider:   Richard Conde MD; Catalog Code:   Miscellaneous Prescription ; Order Dt/Tm:   9/18/2018 1:29:55 PM          Miscellaneous Rx Supply  :   Miscellaneous Rx Supply ; Status:   Prescribed ; Ordered As Mnemonic:   custom fit diabetic shoes ; Simple Display Line:   See Instructions, wear daily for heel pain, neuropathy, venous insufficiency, 2 EA, 0 Refill(s) ; Ordering Provider:   Richard Conde MD; Catalog Code:   Miscellaneous Rx Supply ; Order Dt/Tm:   9/18/2018 1:29:36 PM          oxybutynin  :   oxybutynin ; Status:   Prescribed ; Ordered As Mnemonic:   Ditropan XL 10 mg/24 hr oral tablet, extended release ; Simple Display Line:   10 mg, 1 tab(s), PO, Daily, 90 tab(s), 3 Refill(s) ; Ordering Provider:   Richard Conde MD; Catalog Code:   oxybutynin ; Order Dt/Tm:   9/18/2018 1:25:02 PM          potassium chloride  :   potassium chloride ; Status:   Prescribed ; Ordered As Mnemonic:   potassium chloride 20 mEq oral tablet, extended release ; Simple Display Line:   20 mEq, 1 tab(s), PO, daily, 90 tab(s), 3 Refill(s) ; Ordering Provider:   Richard Conde MD; Catalog Code:   potassium chloride ; Order Dt/Tm:   9/18/2018 1:25:07 PM          triamcinolone topical  :   triamcinolone topical ; Status:   Prescribed ; Ordered As Mnemonic:   triamcinolone 0.1% topical ointment ; Simple Display Line:   1 jonathan, top, tid, apply a thin film  to affected area   Area needs to be dry, 60 gm, 1 Refill(s) ; Ordering Provider:   Richard Conde MD; Catalog Code:   triamcinolone topical ; Order Dt/Tm:   12/11/2017 9:03:44 AM            Home Meds    acetaminophen  :   acetaminophen ; Status:   Documented ; Ordered As Mnemonic:   Tylenol ; Simple Display Line:   PO, PRN: as needed for pain ; Catalog Code:   acetaminophen ; Order Dt/Tm:   1/15/2010 1:47:50 PM          aspirin  :   aspirin ; Status:   Documented ; Ordered As Mnemonic:   aspirin 81 mg oral tablet ; Simple Display Line:   81 mg, 1 tab(s), PO, Daily, 30 tab(s) ; Catalog Code:   aspirin ; Order Dt/Tm:   3/22/2012 10:42:39 AM          calcium-vitamin D  :   calcium-vitamin D ; Status:   Documented ; Ordered As Mnemonic:   calcium (as carbonate)-vitamin D 600 mg-125 intl units oral tablet ; Simple Display Line:   1 tab(s), Oral, tid, 0 Refill(s) ; Catalog Code:   calcium-vitamin D ; Order Dt/Tm:   3/12/2019 11:50:47 AM          cholecalciferol  :   cholecalciferol ; Status:   Documented ; Ordered As Mnemonic:   Vitamin D3 1000 intl units oral capsule ; Simple Display Line:   1,000 International Unit, 1 cap(s), PO, daily ; Catalog Code:   cholecalciferol ; Order Dt/Tm:   4/16/2012 11:05:56 AM          multivitamin  :   multivitamin ; Status:   Documented ; Ordered As Mnemonic:   Daily Multiple Vitamins ; Simple Display Line:   PO, Daily ; Catalog Code:   multivitamin ; Order Dt/Tm:   1/15/2010 1:47:00 PM          naproxen  :   naproxen ; Status:   Documented ; Ordered As Mnemonic:   Aleve 220 mg oral capsule ; Simple Display Line:   220 mg, 1 cap(s), Oral, q12 hrs, 0 Refill(s) ; Catalog Code:   naproxen ; Order Dt/Tm:   3/12/2019 11:51:40 AM

## 2022-02-15 NOTE — LETTER
(Inserted Image. Unable to display)   April 29, 2021  PAKO BARBER  [address] [place] [address]        Dear PAKO,    Thank you for selecting [facility] for your healthcare needs.    Our records indicate you are due for the following services:     Diabetic Exam ~ Please bring your glucose meter and/or your blood glucose diary to your appointment.    (FYI   Regarding office visits: In some instances, a video visit or telephone visit may be offered as an option.)    To schedule an appointment or if you have further questions, please contact your clinic at [phone].    Powered by [employer]    Sincerely,    Richard Conde MD

## 2022-02-15 NOTE — PROGRESS NOTES
Patient:   PAKO BARBER            MRN: [id number]            FIN: [id number]               Age:   78 years     Sex:  Female     :  1940   Associated Diagnoses:   Acute maxillary sinusitis; Peripheral edema   Author:   Richard Conde MD      Chief Complaint   2019 1:00 PM CDT     cough; fever; someone to wrap legs? daughter would like a home nurse      History of Present Illness   patient with cough, hacking and wet, coughing up phlegm, worsening past week, feeling low grade fevers    patient also notes peripheral edema continues to be an issue, daughter was concerned about swelling, would like for her to have legs wrapped daily  patient lives in senior apartments, she is not currently homebound      Health Status   Allergies:    Allergic Reactions (All)  Severity Not Documented  Latex (No reactions were documented)  Opthmolic eye drops (Swelling of eye..)  Vicodin (Behavioral disturbances)  Zocor (Diarrhea)   Medications:  (Selected)   Prescriptions  Prescribed  Ditropan XL 10 mg/24 hr oral tablet, extended release: = 1 tab(s) ( 10 mg ), PO, Daily, # 90 tab(s), 3 Refill(s), Type: Maintenance, Pharmacy: [id number], 1 tab(s) Oral daily  Lasix 40 mg oral tablet: = 1 tab(s) ( 40 mg ), PO, Daily, # 90 tab(s), 3 Refill(s), Type: Maintenance, Pharmacy: [id number], 1 tab(s) Oral daily  atorvastatin 80 mg oral tablet: = 1 tab(s) ( 80 mg ), po, daily, # 90 tab(s), 3 Refill(s), Type: Maintenance, Pharmacy: [id number], 1 tab(s) Oral daily  custom fit diabetic shoes: custom fit diabetic shoes, See Instructions, Instructions: wear daily for heel pain, neuropathy, venous insufficiency, Supply, # 2 EA, 0 Refill(s), Type: Maintenance, Pharmacy: [facility], wear daily for heel pain, neuropathy, venous insufficiency  metFORMIN 500 mg oral tablet: = 1 tab(s) ( 500 mg ), PO, BID, # 180 tab(s), 3 Refill(s), Type: Maintenance, Pharmacy: [id number], 1 tab(s) Oral bid  moldable  orthotics: moldable orthotics, See Instructions, Instructions: use daily with diabetic shoes, Supply, # 6 EA, 0 Refill(s), Type: Maintenance, Pharmacy: [facility], use daily with diabetic shoes  potassium chloride 20 mEq oral tablet, extended release: = 1 tab(s) ( 20 mEq ), PO, daily, # 90 tab(s), 3 Refill(s), Type: Maintenance, Pharmacy: [id number], 1 tab(s) Oral daily  triamcinolone 0.1% topical ointment: 1 jonathan, top, tid, Instructions: apply a thin film  to affected area  Area needs to be dry, # 60 gm, 1 Refill(s), Type: Maintenance, Pharmacy: [facility] #[id number], 1 jonathan top tid,Instr:apply a thin film; to affected area; Area needs to be dry  Documented Medications  Documented  Aleve 220 mg oral capsule: = 1 cap(s) ( 220 mg ), Oral, q12 hrs, 0 Refill(s), Type: Maintenance  Daily Multiple Vitamins: PO, Daily, 0  Tylenol: PO, PRN: as needed for pain, 0 Refill(s), Type: Maintenance  Vitamin D3 1000 intl units oral capsule: 1 cap(s) ( 1,000 International Unit ), PO, daily, 0 Refill(s), Type: Soft Stop  aspirin 81 mg oral tablet: 1 tab(s) ( 81 mg ), PO, Daily, # 30 tab(s), 0 Refill(s), Type: Maintenance  calcium (as carbonate)-vitamin D 600 mg-125 intl units oral tablet: 1 tab(s), Oral, tid, 0 Refill(s), Type: Maintenance   Problem list:    All Problems (Selected)  Hyperplastic Colonic Polyp / SNOMED CT 440829517 / Confirmed  Buttock Pain / ICD-9-.1 / Confirmed  Chronic Venous Insufficiency / ICD-9-.81 / Confirmed  CVA, old, cognitive deficits / ICD-9-.0 / Confirmed  Diabetes mellitus with diabetic neuropathy / SNOMED CT 681458498 / Confirmed  EDEMA / ICD-9-.3 / Confirmed  Metabolic Syndrome / ICD-9-.7 / Confirmed  Mixed hyperlipidemia / SNOMED CT 752126873 / Confirmed  OA (Osteoarthritis) / ICD-9-.90 / Confirmed  Obesity / ICD-9-.00 / Probable  Osteoporosis / ICD-9-.00 / Confirmed  Polio / ICD-9-.90 / Confirmed  Diabetes mellitus, type 2 / SNOMED CT  673516719 / Confirmed  Varicose Veins of Legs / ICD-9-.9 / Confirmed      Histories   Past Medical History:    Active  Hyperplastic Colonic Polyp (SNOMED CT 733713188): Onset in 2004 at 64 years.  Buttock Pain (ICD-9-.1): Onset in 2004 at 63 years.  Comments:  5/8/2013 CDT 11:24 AM Michelle Preston  Work-related fall.  Chronic left buttock pain since then.  OA (Osteoarthritis) (ICD-9-.90)  Osteoporosis (ICD-9-.00)  EDEMA (ICD-9-.3)  Metabolic Syndrome (ICD-9-.7)  Polio (ICD-9-.90)  Chronic Venous Insufficiency (ICD-9-.81)  Varicose Veins of Legs (ICD-9-.9)  Resolved  Inpatient stay (SNOMED CT 146538243): Onset on 10/10/2018 at 78 years.  Resolved on 10/13/2018 at 78 years.  Comments:  10/18/2018 CDT 9:42 AM BOBT - [place] - Acute and chronic right-sided weakness.  Fracture of Wrist (ICD-9-.00): Onset in 2004 at 63 years.  Resolved.  Comments:  5/8/2013 CDT 11:22 AM Michelle Preston  Left    5/8/2013 CDT 11:25 AM Michelle Preston  Work-related fall.  Stroke (ICD-9-): Onset on 7/14/2003 at 63 years.  Resolved.  Comments:  5/8/2013 CDT 12:03 PM Michelle Preston  Acute CVA with right-sided weakness.  Tobacco user (ICD-9-.1):  Resolved.  Comments:  5/8/2013 CDT 11:46 AM Michelle Preston  Patient states that she quit in 1989.   Family History:    Heart disease  Mother  Grandmother (M)  Brother  Bladder cancer..  Mother     Procedure history:    Extracapsular cataract extraction and insertion of intraocular lens ([id number]) on 9/15/2016 at 76 Years.  Comments:  10/10/2016 1:01 PM BOBT  Rachel Ivan  [facility].  Extracapsular cataract extraction and insertion of intraocular lens ([id number]) on 9/1/2016 at 76 Years.  Comments:  9/8/2016 2:37 PM CDT - Rachel Ivan  Left.  Colonoscopy ([id number]) on 6/25/2014 at 74 Years.  Comments:  6/25/2014 9:16 AM CDT - Demond Knight MD  Moderate left diberticulosis. No polyps. Repeat 10  years.  Sclerotherapy of vein of lower limb (4285249847) in  at 70 Years.  Comments:  2013 11:40 AM Michelle Preston  Left leg in 2010.  Right leg in 2010.  [facility].  Laser ablation of long saphenous vein ([id number]) on 8/3/2009 at 69 Years.  Comments:  2013 11:45 AM Michelle Preston  Right great and right accessory saphenous veins.  Fluoroscopically-guided needle placement for injection of ischial bursa on the left. on 10/10/2008 at 68 Years.  Comments:  2013 11:49 AM Michelle Preston  [facility].  Bone density scan (038973552) on 2007 at 67 Years.  Comments:  2013 11:58 AM Michelle Preston  Osteopenia  Fracture of Wrist (814.00) in  at 64 Years.  Comments:  2013 11:54 AM Michelle Preston  Left  Colonoscopy ([id number]) on 2004 at 63 Years.  Comments:  2011 7:35 AM Rachel Morales  Repeat in 10 yrs.    10/4/2010 11:58 AM MARTIN - Demond Knight MD  diverticulosis. single hyperplastic rectal polyp  Bone density scan (377697706) in  at 62 Years.  Arthroscopy of knee (162123266) in  at 60 Years.  Comments:  2013 12:08 PM Michelle Preston  Left knee.  Flexible sigmoidoscopy (17671064) on 1999 at 59 Years.  Arthroscopy of Knee (80.26) in  at 58 Years.   section ([id number]).  Comments:  2013 11:52 AM Michelle Preston  1960's    2010 12:42 PM Mila García   3, Para 3   Social History:        Alcohol Assessment: Denies Alcohol Use            Never      Tobacco Assessment: Past            Past, Cigarettes, 15 per day.  Started age 19 Years.  Stopped age 44 Years.      Substance Abuse Assessment: Denies Substance Abuse      Employment and Education Assessment            Retired      Home and Environment Assessment            Marital status: .  3 children.      Other Assessment            Marital status,       Physical Examination   Vital Signs   2019 1:00 PM CDT Temperature  Tympanic 98.8 DegF    Peripheral Pulse Rate 64 bpm    HR Method Manual    Systolic Blood Pressure 118 mmHg    Diastolic Blood Pressure 58 mmHg  LOW    Mean Arterial Pressure 78 mmHg    BP Method Manual      Measurements from flowsheet : Measurements   4/4/2019 1:00 PM CDT Height Measured - Standard 64 in    Weight Measured - Standard 194.0 lb    BSA 1.99 m2    Body Mass Index 33.3 kg/m2  HI      General:  Alert and oriented, No acute distress.    Eye:  Pupils are equal, round and reactive to light, Normal conjunctiva.    HENT:  Normocephalic, Tympanic membranes are clear, Oral mucosa is moist, No pharyngeal erythema.         Sinus: Bilateral, Maxillary sinus, Tenderness.    Neck:  Supple, Non-tender.    Respiratory:  Lungs are clear to auscultation.    Cardiovascular:  Normal rate, Regular rhythm.       Impression and Plan   Diagnosis     Acute maxillary sinusitis (OVA62-OA J01.00).     Orders     Orders   Pharmacy:  amoxicillin 875 mg oral tablet (Prescribe): = 1 tab(s) ( 875 mg ), PO, BID, x 10 day(s), # 20 tab(s), 0 Refill(s), Type: Maintenance, Pharmacy: [facility] [id number] [facility] [employer], 1 tab(s) Oral bid,x10 day(s).     Diagnosis     Peripheral edema (UCB40-YU R60.9).     Course:  discussed options for getting additional home assistance. If daughter has questions, patient will have her call me to ask questions directly. Gives verbal permission for us to talk..

## 2022-02-15 NOTE — NURSING NOTE
Comprehensive Intake Entered On:  9/5/2019 9:16 AM CDT    Performed On:  9/5/2019 9:15 AM CDT by Eliana Sibley MA               Summary   Chief Complaint :   Swelliing and burning in legs, cannot walk, eyes burn   Advance Directive :   Yes   Ht/Wt Measurement Refused by Patient? :   Yes   Systolic Blood Pressure :   134 mmHg (HI)    Diastolic Blood Pressure :   74 mmHg   Mean Arterial Pressure :   94 mmHg   Peripheral Pulse Rate :   64 bpm   BP Site :   Right arm   Pulse Site :   Radial artery   BP Method :   Manual   HR Method :   Manual   Temperature Tympanic :   98.4 DegF(Converted to: 36.9 DegC)    Oxygen Saturation :   97 %   Eliana Sibley MA - 9/5/2019 9:15 AM CDT   Health Status   Allergies Verified? :   Yes   Medication History Verified? :   Yes   Immunizations Current :   Yes   Medical History Verified? :   Yes   Pre-Visit Planning Status :   Completed   Tobacco Use? :   Former smoker   Eliana Sibley MA - 9/5/2019 9:15 AM CDT   Consents   Consent for Immunization Exchange :   Consent Granted   Consent for Immunizations to Providers :   Consent Granted   Eliana Sibley MA - 9/5/2019 9:15 AM CDT   Meds / Allergies   (As Of: 9/5/2019 9:16:52 AM CDT)   Allergies (Active)   Latex  Estimated Onset Date:   Unspecified ; Created By:   Ramila Mayes CMA; Reaction Status:   Active ; Category:   Environment ; Substance:   Latex ; Type:   Allergy ; Updated By:   Ramila Mayes CMA; Reviewed Date:   9/5/2019 9:16 AM CDT      opthmolic eye drops  Estimated Onset Date:   Unspecified ; Reactions:   Swelling of eye.. ; Comments:     Comment 1: Gentamycin opthalmic   ; Created By:   Michelle Zhao; Reaction Status:   Active ; Category:   Drug ; Substance:   opthmolic eye drops ; Type:   Allergy ; Updated By:   Michelle Zhao; Source:   Paper Chart ; Reviewed Date:   9/5/2019 9:16 AM CDT      Vicodin  Estimated Onset Date:   Unspecified ; Reactions:   behavioral disturbances ; Created By:   Elyse Anne RN; Reaction Status:    Active ; Category:   Drug ; Substance:   Vicodin ; Type:   Allergy ; Updated By:   Elyse Anne RN; Reviewed Date:   9/5/2019 9:16 AM CDT      Zocor  Estimated Onset Date:   Unspecified ; Reactions:   Diarrhea ; Created By:   Michelle Zhao; Reaction Status:   Active ; Category:   Drug ; Substance:   Zocor ; Type:   Allergy ; Updated By:   Michelle Zhao; Reviewed Date:   9/5/2019 9:16 AM CDT        Medication List   (As Of: 9/5/2019 9:16:52 AM CDT)   Prescription/Discharge Order    atorvastatin  :   atorvastatin ; Status:   Prescribed ; Ordered As Mnemonic:   atorvastatin 80 mg oral tablet ; Simple Display Line:   1 tab(s), Oral, daily, 90 tab(s), 3 Refill(s) ; Ordering Provider:   Richard Conde MD; Catalog Code:   atorvastatin ; Order Dt/Tm:   7/2/2019 2:49:11 PM          metFORMIN  :   metFORMIN ; Status:   Prescribed ; Ordered As Mnemonic:   metFORMIN 500 mg oral tablet ; Simple Display Line:   500 mg, 1 tab(s), PO, BID, 180 tab(s), 3 Refill(s) ; Ordering Provider:   Richard Conde MD; Catalog Code:   metFORMIN ; Order Dt/Tm:   7/2/2019 2:49:11 PM          furosemide  :   furosemide ; Status:   Prescribed ; Ordered As Mnemonic:   Lasix 40 mg oral tablet ; Simple Display Line:   40 mg, 1 tab(s), PO, Daily, 90 tab(s), 3 Refill(s) ; Ordering Provider:   Richard Conde MD; Catalog Code:   furosemide ; Order Dt/Tm:   7/2/2019 2:49:10 PM          potassium chloride  :   potassium chloride ; Status:   Prescribed ; Ordered As Mnemonic:   potassium chloride 20 mEq oral tablet, extended release ; Simple Display Line:   20 mEq, 1 tab(s), PO, daily, 90 tab(s), 3 Refill(s) ; Ordering Provider:   Richard Conde MD; Catalog Code:   potassium chloride ; Order Dt/Tm:   7/2/2019 2:49:10 PM          oxybutynin  :   oxybutynin ; Status:   Prescribed ; Ordered As Mnemonic:   Ditropan XL 10 mg/24 hr oral tablet, extended release ; Simple Display Line:   10 mg, 1 tab(s), PO, Daily, 90 tab(s), 3 Refill(s) ; Ordering Provider:    Richard Conde MD; Catalog Code:   oxybutynin ; Order Dt/Tm:   7/2/2019 2:49:09 PM          Miscellaneous Rx Supply  :   Miscellaneous Rx Supply ; Status:   Prescribed ; Ordered As Mnemonic:   electric recliner and lift chair ; Simple Display Line:   See Instructions, use for sitting and for elevating legs, 1 EA, 0 Refill(s) ; Ordering Provider:   Richard Conde MD; Catalog Code:   Miscellaneous Rx Supply ; Order Dt/Tm:   5/9/2019 11:15:47 AM          Miscellaneous Rx Supply  :   Miscellaneous Rx Supply ; Status:   Prescribed ; Ordered As Mnemonic:   custom fit diabetic shoes ; Simple Display Line:   See Instructions, wear daily for heel pain, neuropathy, venous insufficiency, 2 EA, 0 Refill(s) ; Ordering Provider:   Richard Conde MD; Catalog Code:   Miscellaneous Rx Supply ; Order Dt/Tm:   9/18/2018 1:29:36 PM          Miscellaneous Prescription  :   Miscellaneous Prescription ; Status:   Prescribed ; Ordered As Mnemonic:   moldable orthotics ; Simple Display Line:   See Instructions, use daily with diabetic shoes, 6 EA, 0 Refill(s) ; Ordering Provider:   Richard Conde MD; Catalog Code:   Miscellaneous Prescription ; Order Dt/Tm:   9/18/2018 1:29:55 PM          triamcinolone topical  :   triamcinolone topical ; Status:   Prescribed ; Ordered As Mnemonic:   triamcinolone 0.1% topical ointment ; Simple Display Line:   1 jonathan, top, tid, apply a thin film  to affected area  Area needs to be dry, 60 gm, 1 Refill(s) ; Ordering Provider:   Richard Conde MD; Catalog Code:   triamcinolone topical ; Order Dt/Tm:   12/11/2017 9:03:44 AM            Home Meds    naproxen  :   naproxen ; Status:   Documented ; Ordered As Mnemonic:   Aleve 220 mg oral capsule ; Simple Display Line:   220 mg, 1 cap(s), Oral, q12 hrs, 0 Refill(s) ; Catalog Code:   naproxen ; Order Dt/Tm:   3/12/2019 11:51:40 AM          calcium-vitamin D  :   calcium-vitamin D ; Status:   Documented ; Ordered As Mnemonic:   calcium (as  carbonate)-vitamin D 600 mg-125 intl units oral tablet ; Simple Display Line:   1 tab(s), Oral, tid, 0 Refill(s) ; Catalog Code:   calcium-vitamin D ; Order Dt/Tm:   3/12/2019 11:50:47 AM          cholecalciferol  :   cholecalciferol ; Status:   Documented ; Ordered As Mnemonic:   Vitamin D3 1000 intl units oral capsule ; Simple Display Line:   1,000 International Unit, 1 cap(s), PO, daily ; Catalog Code:   cholecalciferol ; Order Dt/Tm:   4/16/2012 11:05:56 AM          aspirin  :   aspirin ; Status:   Documented ; Ordered As Mnemonic:   aspirin 81 mg oral tablet ; Simple Display Line:   81 mg, 1 tab(s), PO, Daily, 30 tab(s) ; Catalog Code:   aspirin ; Order Dt/Tm:   3/22/2012 10:42:39 AM          acetaminophen  :   acetaminophen ; Status:   Documented ; Ordered As Mnemonic:   Tylenol ; Simple Display Line:   PO, PRN: as needed for pain ; Catalog Code:   acetaminophen ; Order Dt/Tm:   1/15/2010 1:47:50 PM          multivitamin  :   multivitamin ; Status:   Documented ; Ordered As Mnemonic:   Daily Multiple Vitamins ; Simple Display Line:   PO, Daily ; Catalog Code:   multivitamin ; Order Dt/Tm:   1/15/2010 1:47:00 PM

## 2022-02-15 NOTE — TELEPHONE ENCOUNTER
---------------------  From: Parisa Clemons   To: [facility] Message Pool ([id number]);     Cc: Rocío Lyles CMA;      Sent: 12/22/2020 10:22:18 AM CST  Subject: General Message     I heard back from Jake, Dimple and Donaldo and neither can accept patient for home health. Please advise on what you would like patient to do.---------------------  From: Madelyn Felton CMA ([facility] Message Pool ([id number]_[facility]))   To: Richard Conde MD;     Sent: 12/22/2020 10:34:55 AM CST  Subject: FW: General Message     I called and updated pt. She agrees to do edema mgmt through PT. There is an order for PT placed on 10/28 - Any changes to that? I can send over to Maribel Christian.  ** Submitted: **  Order:Referral (Request)  Details:  12/22/2020 11:13 AM CST, Referred to: Physical Therapy, Reason for referral: eval and treat for peripheral edema, EDEMA  Chronic Venous Insufficiency         Signed by Richard Conde MD  12/22/2020 5:13:00 PM [facility]---------------------  From: Richard Conde MD   To: [facility] Pool ([id number]);     Sent: 12/22/2020 11:14:08 AM CST  Subject: RE: General Message---------------------  From: Madelyn Felton CMA ([facility] Message Pool ([id number]))   To: Parisa Clemons;     Sent: 12/22/2020 11:20:19 AM CST  Subject: FW: General Message     Here's the plan Parisa. I'm guessing you would like me to change it to a PT request and not a referral?---------------------  From: Parisa Clemons   To: Madelyn Felton CMA;     Sent: 12/22/2020 11:37:17 AM CST  Subject: RE: General Message     Yes please, thank you!!Order modified and brought over to Maribel Crhistian.   denies

## 2022-02-15 NOTE — NURSING NOTE
Comprehensive Intake Entered On:  9/12/2019 10:48 AM CDT    Performed On:  9/12/2019 10:41 AM CDT by Eliana Sibley MA               Summary   Chief Complaint :   Follow up Cellulitis, Legs are better    Advance Directive :   Yes   Weight Measured :   189.2 lb(Converted to: 189 lb 3 oz, 85.82 kg)    Height Measured :   64 in(Converted to: 5 ft 4 in, 162.56 cm)    Body Mass Index :   32.47 kg/m2 (HI)    Body Surface Area :   1.97 m2   Systolic Blood Pressure :   126 mmHg   Diastolic Blood Pressure :   70 mmHg   Mean Arterial Pressure :   89 mmHg   Peripheral Pulse Rate :   70 bpm   BP Site :   Left arm   Pulse Site :   Radial artery   BP Method :   Manual   HR Method :   Manual   Temperature Tympanic :   98.3 DegF(Converted to: 36.8 DegC)    Oxygen Saturation :   97 %   Eliana Sibley MA - 9/12/2019 10:41 AM CDT   Health Status   Allergies Verified? :   Yes   Medication History Verified? :   Yes   Immunizations Current :   Yes   Medical History Verified? :   Yes   Pre-Visit Planning Status :   Completed   Tobacco Use? :   Former smoker   Eliana Sibley MA - 9/12/2019 10:41 AM CDT   Consents   Consent for Immunization Exchange :   Consent Granted   Consent for Immunizations to Providers :   Consent Granted   Eliana Sibley MA - 9/12/2019 10:41 AM CDT   Meds / Allergies   (As Of: 9/12/2019 10:48:32 AM CDT)   Allergies (Active)   Latex  Estimated Onset Date:   Unspecified ; Created By:   Ramila Mayes CMA; Reaction Status:   Active ; Category:   Environment ; Substance:   Latex ; Type:   Allergy ; Updated By:   Ramila Mayes CMA; Reviewed Date:   9/12/2019 10:46 AM CDT      opthmolic eye drops  Estimated Onset Date:   Unspecified ; Reactions:   Swelling of eye.. ; Comments:     Comment 1: Gentamycin opthalmic   ; Created By:   Michelle Zhao; Reaction Status:   Active ; Category:   Drug ; Substance:   opthmolic eye drops ; Type:   Allergy ; Updated By:   Michelle Zhao; Source:   Paper Chart ; Reviewed Date:   9/12/2019  10:46 AM CDT      Vicodin  Estimated Onset Date:   Unspecified ; Reactions:   behavioral disturbances ; Created By:   Elyse Anne RN; Reaction Status:   Active ; Category:   Drug ; Substance:   Vicodin ; Type:   Allergy ; Updated By:   Elyse Anne RN; Reviewed Date:   9/12/2019 10:46 AM CDT      Zocor  Estimated Onset Date:   Unspecified ; Reactions:   Diarrhea ; Created By:   Michelle Zhao; Reaction Status:   Active ; Category:   Drug ; Substance:   Zocor ; Type:   Allergy ; Updated By:   Michelle Zhao; Reviewed Date:   9/12/2019 10:46 AM CDT        Medication List   (As Of: 9/12/2019 10:48:32 AM CDT)   Prescription/Discharge Order    azelastine ophthalmic  :   azelastine ophthalmic ; Status:   Prescribed ; Ordered As Mnemonic:   azelastine 0.05% ophthalmic solution ; Simple Display Line:   1 drop(s), Eye-Both, bid, PRN: for allergy symptoms, 6 mL, 0 Refill(s) ; Ordering Provider:   Negrito Abdul MD; Catalog Code:   azelastine ophthalmic ; Order Dt/Tm:   9/5/2019 9:31:44 AM          nystatin topical  :   nystatin topical ; Status:   Prescribed ; Ordered As Mnemonic:   nystatin 100,000 units/g topical cream ; Simple Display Line:   1 jonathan, TOP, TID, 30 g, 2 Refill(s) ; Ordering Provider:   Negrito Abdul MD; Catalog Code:   nystatin topical ; Order Dt/Tm:   9/5/2019 9:30:35 AM          cephalexin  :   cephalexin ; Status:   Prescribed ; Ordered As Mnemonic:   cephalexin 500 mg oral capsule ; Simple Display Line:   500 mg, 1 cap(s), Oral, q8 hrs, for 7 day(s), 21 cap(s), 0 Refill(s) ; Ordering Provider:   Negrito Abdul MD; Catalog Code:   cephalexin ; Order Dt/Tm:   9/5/2019 9:28:33 AM          atorvastatin  :   atorvastatin ; Status:   Prescribed ; Ordered As Mnemonic:   atorvastatin 80 mg oral tablet ; Simple Display Line:   1 tab(s), Oral, daily, 90 tab(s), 3 Refill(s) ; Ordering Provider:   Richard Conde MD; Catalog Code:   atorvastatin ; Order Dt/Tm:   7/2/2019 2:49:11 PM           metFORMIN  :   metFORMIN ; Status:   Prescribed ; Ordered As Mnemonic:   metFORMIN 500 mg oral tablet ; Simple Display Line:   500 mg, 1 tab(s), PO, BID, 180 tab(s), 3 Refill(s) ; Ordering Provider:   Richard Conde MD; Catalog Code:   metFORMIN ; Order Dt/Tm:   7/2/2019 2:49:11 PM          furosemide  :   furosemide ; Status:   Prescribed ; Ordered As Mnemonic:   Lasix 40 mg oral tablet ; Simple Display Line:   40 mg, 1 tab(s), PO, Daily, 90 tab(s), 3 Refill(s) ; Ordering Provider:   Richard Conde MD; Catalog Code:   furosemide ; Order Dt/Tm:   7/2/2019 2:49:10 PM          potassium chloride  :   potassium chloride ; Status:   Prescribed ; Ordered As Mnemonic:   potassium chloride 20 mEq oral tablet, extended release ; Simple Display Line:   20 mEq, 1 tab(s), PO, daily, 90 tab(s), 3 Refill(s) ; Ordering Provider:   Richard Conde MD; Catalog Code:   potassium chloride ; Order Dt/Tm:   7/2/2019 2:49:10 PM          oxybutynin  :   oxybutynin ; Status:   Prescribed ; Ordered As Mnemonic:   Ditropan XL 10 mg/24 hr oral tablet, extended release ; Simple Display Line:   10 mg, 1 tab(s), PO, Daily, 90 tab(s), 3 Refill(s) ; Ordering Provider:   Richard Conde MD; Catalog Code:   oxybutynin ; Order Dt/Tm:   7/2/2019 2:49:09 PM          Miscellaneous Rx Supply  :   Miscellaneous Rx Supply ; Status:   Prescribed ; Ordered As Mnemonic:   electric recliner and lift chair ; Simple Display Line:   See Instructions, use for sitting and for elevating legs, 1 EA, 0 Refill(s) ; Ordering Provider:   Richard Conde MD; Catalog Code:   Miscellaneous Rx Supply ; Order Dt/Tm:   5/9/2019 11:15:47 AM          Miscellaneous Rx Supply  :   Miscellaneous Rx Supply ; Status:   Prescribed ; Ordered As Mnemonic:   custom fit diabetic shoes ; Simple Display Line:   See Instructions, wear daily for heel pain, neuropathy, venous insufficiency, 2 EA, 0 Refill(s) ; Ordering Provider:   Richard Conde MD; Catalog Code:   Miscellaneous Rx  Supply ; Order Dt/Tm:   9/18/2018 1:29:36 PM          Miscellaneous Prescription  :   Miscellaneous Prescription ; Status:   Prescribed ; Ordered As Mnemonic:   moldable orthotics ; Simple Display Line:   See Instructions, use daily with diabetic shoes, 6 EA, 0 Refill(s) ; Ordering Provider:   Richard Conde MD; Catalog Code:   Miscellaneous Prescription ; Order Dt/Tm:   9/18/2018 1:29:55 PM          triamcinolone topical  :   triamcinolone topical ; Status:   Prescribed ; Ordered As Mnemonic:   triamcinolone 0.1% topical ointment ; Simple Display Line:   1 jonathan, top, tid, apply a thin film  to affected area  Area needs to be dry, 60 gm, 1 Refill(s) ; Ordering Provider:   Richard Conde MD; Catalog Code:   triamcinolone topical ; Order Dt/Tm:   12/11/2017 9:03:44 AM            Home Meds    naproxen  :   naproxen ; Status:   Documented ; Ordered As Mnemonic:   Aleve 220 mg oral capsule ; Simple Display Line:   220 mg, 1 cap(s), Oral, q12 hrs, 0 Refill(s) ; Catalog Code:   naproxen ; Order Dt/Tm:   3/12/2019 11:51:40 AM          calcium-vitamin D  :   calcium-vitamin D ; Status:   Documented ; Ordered As Mnemonic:   calcium (as carbonate)-vitamin D 600 mg-125 intl units oral tablet ; Simple Display Line:   1 tab(s), Oral, tid, 0 Refill(s) ; Catalog Code:   calcium-vitamin D ; Order Dt/Tm:   3/12/2019 11:50:47 AM          cholecalciferol  :   cholecalciferol ; Status:   Documented ; Ordered As Mnemonic:   Vitamin D3 1000 intl units oral capsule ; Simple Display Line:   1,000 International Unit, 1 cap(s), PO, daily ; Catalog Code:   cholecalciferol ; Order Dt/Tm:   4/16/2012 11:05:56 AM          aspirin  :   aspirin ; Status:   Documented ; Ordered As Mnemonic:   aspirin 81 mg oral tablet ; Simple Display Line:   81 mg, 1 tab(s), PO, Daily, 30 tab(s) ; Catalog Code:   aspirin ; Order Dt/Tm:   3/22/2012 10:42:39 AM          acetaminophen  :   acetaminophen ; Status:   Documented ; Ordered As Mnemonic:   Tylenol ;  Simple Display Line:   PO, PRN: as needed for pain ; Catalog Code:   acetaminophen ; Order Dt/Tm:   1/15/2010 1:47:50 PM          multivitamin  :   multivitamin ; Status:   Documented ; Ordered As Mnemonic:   Daily Multiple Vitamins ; Simple Display Line:   PO, Daily ; Catalog Code:   multivitamin ; Order Dt/Tm:   1/15/2010 1:47:00 PM

## 2022-02-15 NOTE — LETTER
(Inserted Image. Unable to display)   [address] [place] [address]   [phone]  October 29, 2020      PAKO BARBER      [address]   [place], [place] [id number]      Dear PAKO,    Thank you for selecting [facility] for your healthcare needs. Below you will find the results of the recent tests done at our clinic.     Your lab results are good. We should repeat the A1c in 6 months. Cholesterol levels are much better. Let me know if you have questions.    Result Name Current Result Previous Result Reference Range   Sodium Level (mmol/L)  140 10/28/2020  139 11/8/2019   140 1/15/2019 135 - 146   Potassium Level (mmol/L)  4.1 10/28/2020  4.3 11/8/2019   4.2 1/15/2019 3.5 - 5.3   Chloride Level (mmol/L)  105 10/28/2020  107 11/8/2019   104 1/15/2019 98 - 110   CO2 Level (mmol/L)  26 10/28/2020  22 11/8/2019   27 1/15/2019 20 - 32   Glucose Level (mg/dL) ((H)) 110 10/28/2020  87 11/8/2019  ((H)) 130 1/15/2019 65 - 99   BUN (mg/dL)  15 10/28/2020  16 11/8/2019   21 1/15/2019 7 - 25   Creatinine Level (mg/dL)  0.74 10/28/2020  0.75 11/8/2019   0.76 1/15/2019 0.60 - 0.93   eGFR (mL/min/1.73m2)  78 10/28/2020  77 11/8/2019   76 1/15/2019 > OR = 60 -    eGFR  (mL/min/1.73m2)  90 10/28/2020  89 11/8/2019   88 1/15/2019 > OR = 60 -    Calcium Level (mg/dL)  9.8 10/28/2020  9.6 11/8/2019   9.4 1/15/2019 8.6 - 10.4   Bilirubin Total (mg/dL)  0.6 10/28/2020  0.8 1/15/2019 0.2 - 1.2   Alkaline Phosphatase (unit/L)  103 10/28/2020  66 1/15/2019 37 - 153   AST/SGOT (unit/L)  32 10/28/2020  33 1/15/2019 10 - 35   ALT/SGPT (unit/L) ((H)) 31 10/28/2020  29 1/15/2019 6 - 29   Protein Total (gm/dL)  6.8 10/28/2020  6.7 1/15/2019 6.1 - 8.1   Albumin Level (gm/dL)  4.3 10/28/2020  4.0 1/15/2019 3.6 - 5.1   Globulin  2.5 10/28/2020  2.7 1/15/2019 1.9 - 3.7   A/G Ratio  1.7 10/28/2020  1.5 1/15/2019 1.0 - 2.5   Hgb A1c ((H)) 6.4 10/28/2020 ((H)) 6.4 11/8/2019  ((H)) 6.7  6/26/2019  - <5.7   Cholesterol (mg/dL)  171 10/28/2020 ((H)) 274 11/8/2019   158 6/26/2019  - <200   Non-HDL Cholesterol  122 10/28/2020 ((H)) 237 11/8/2019   115 6/26/2019  - <130   HDL (mg/dL) ((L)) 49 10/28/2020 ((L)) 37 11/8/2019  ((L)) 43 6/26/2019 > OR = 50 -    Cholesterol/HDL Ratio  3.5 10/28/2020 ((H)) 7.4 11/8/2019   3.7 6/26/2019  - <5.0   LDL  91 10/28/2020  See comment 11/8/2019   81 6/26/2019    Triglyceride (mg/dL) ((H)) 222 10/28/2020 ((H)) 559 11/8/2019  ((H)) 243 6/26/2019  - <150   TSH (mIU/L)  1.08 10/28/2020  0.40 - 4.50   WBC  5.4 10/28/2020  6.2 11/8/2019   6.3 1/15/2019 3.8 - 10.8   RBC  4.24 10/28/2020  4.08 11/8/2019   4.28 1/15/2019 3.80 - 5.10   Hgb (gm/dL)  13.4 10/28/2020  12.8 11/8/2019   13.5 1/15/2019 11.7 - 15.5   Hct (%)  39.2 10/28/2020  37.8 11/8/2019   40.3 1/15/2019 35.0 - 45.0   MCV (fL)  92.5 10/28/2020  92.6 11/8/2019   94.2 1/15/2019 80.0 - 100.0   MCH (pg)  31.6 10/28/2020  31.4 11/8/2019   31.5 1/15/2019 27.0 - 33.0   MCHC (gm/dL)  34.2 10/28/2020  33.9 11/8/2019   33.5 1/15/2019 32.0 - 36.0   RDW (%)  12.3 10/28/2020  12.3 11/8/2019   12.2 1/15/2019 11.0 - 15.0   Platelet  177 10/28/2020  174 11/8/2019   155 1/15/2019 140 - 400   MPV (fL)  10.2 10/28/2020  10.3 11/8/2019   10.5 1/15/2019 7.5 - 12.5       Please contact me or my assistant at [phone] if you have any questions or concerns.     Sincerely,        Richard Conde M.D.

## 2022-02-15 NOTE — LETTER
(Inserted Image. Unable to display)   [address] [place]  [address]  [phone]    June 27, 2019      PAKO BARBER      [address]   [place], [place] [id number]        Dear PAKO,    Thank you for selecting [facility] for your healthcare needs. Below you will find the result of your recent test(s) done at our clinic.     These are stable. Please keep regular visits with Dr. Conde.      Result Name Current Result Previous Result Reference Range   Hgb A1c ((H)) 6.7 6/26/2019 ((H)) 6.8 1/15/2019  - <5.7   Cholesterol (mg/dL)  158 6/26/2019  158 6/6/2018  - <200   Non-HDL Cholesterol  115 6/26/2019  116 6/6/2018  - <130   HDL (mg/dL) ((L)) 43 6/26/2019 ((L)) 42 6/6/2018 >50 -    Cholesterol/HDL Ratio  3.7 6/26/2019  3.8 6/6/2018  - <5.0   LDL  81 6/26/2019  89 6/6/2018    Triglyceride (mg/dL) ((H)) 243 6/26/2019 ((H)) 177 6/6/2018  - <150   U Microalbumin (mg/dL)  2.1 6/26/2019  2.2 6/6/2018 See Note: -        Please contact my practice at [phone] if you have any questions or concerns.      Sincerely,        Negrito Abdul MD ,   Otilia Conde MD,   Jan York PA-C

## 2022-02-15 NOTE — PROGRESS NOTES
Patient:   PAKO BARBER            MRN: [id number]            FIN: [id number]               Age:   79 years     Sex:  Female     :  1940   Associated Diagnoses:   Cellulitis of leg; Allergic conjunctivitis   Author:   Negrito Abdul MD      Visit Information      Date of Service: 2019 09:00 am  Performing Location: [facility]  Encounter#: [id number]      Chief Complaint   2019 9:15 AM CDT     Swelliing and burning in legs, cannot walk, eyes burn     Chief complaint and symptoms noted above confirmed with patient.      Subjective   Chief complaint 2019 9:15 AM CDT     Swelliing and burning in legs, cannot walk, eyes burn  .  Additional information Been going on for a week.  No F/C/NS.        Health Status   Allergies:    Allergic Reactions (Selected)  Severity Not Documented  Latex (No reactions were documented)  Opthmolic eye drops (Swelling of eye..)  Vicodin (Behavioral disturbances)  Zocor (Diarrhea)   Medications:  (Selected)   Prescriptions  Prescribed  Ditropan XL 10 mg/24 hr oral tablet, extended release: = 1 tab(s) ( 10 mg ), PO, Daily, # 90 tab(s), 3 Refill(s), Type: Maintenance, Pharmacy: [facility], 1 tab(s) Oral daily  Lasix 40 mg oral tablet: = 1 tab(s) ( 40 mg ), PO, Daily, # 90 tab(s), 3 Refill(s), Type: Maintenance, Pharmacy: [facility], 1 tab(s) Oral daily  atorvastatin 80 mg oral tablet: = 1 tab(s), Oral, daily, # 90 tab(s), 3 Refill(s), Type: Maintenance, Pharmacy: [facility], 1 tab(s) Oral daily  azelastine 0.05% ophthalmic solution: 1 drop(s), Eye-Both, bid, PRN: for allergy symptoms, # 6 mL, 0 Refill(s), Type: Acute, Pharmacy: [facility], 1 drop(s) Eye-Both bid,PRN:for allergy symptoms  cephalexin 500 mg oral capsule: = 1 cap(s) ( 500 mg ), Oral, q8 hrs, x 7 day(s), # 21 cap(s), 0 Refill(s), Type: Acute, Pharmacy: [facility] [id number] [facility], 1 cap(s) Oral q8 hrs,x7 day(s)  custom fit diabetic shoes: custom fit diabetic  shoes, See Instructions, Instructions: wear daily for heel pain, neuropathy, venous insufficiency, Supply, # 2 EA, 0 Refill(s), Type: Maintenance, Pharmacy: [facility], wear daily for heel pain, neuropathy, venous insufficiency  electric recliner and lift chair: electric recliner and lift chair, See Instructions, Instructions: use for sitting and for elevating legs, Supply, # 1 EA, 0 Refill(s), Type: Maintenance  metFORMIN 500 mg oral tablet: = 1 tab(s) ( 500 mg ), PO, BID, # 180 tab(s), 3 Refill(s), Type: Maintenance, Pharmacy: [facility] [id number] [facility], 1 tab(s) Oral bid  moldable orthotics: moldable orthotics, See Instructions, Instructions: use daily with diabetic shoes, Supply, # 6 EA, 0 Refill(s), Type: Maintenance, Pharmacy: [facility], use daily with diabetic shoes  nystatin 100,000 units/g topical cream: 1 jonathan, TOP, TID, # 30 g, 2 Refill(s), Type: Maintenance, Pharmacy: [facility] [id number] [facility], 1 jonathan Topical tid  potassium chloride 20 mEq oral tablet, extended release: = 1 tab(s) ( 20 mEq ), PO, daily, # 90 tab(s), 3 Refill(s), Type: Maintenance, Pharmacy: [facility] [id number] [facility], 1 tab(s) Oral daily  triamcinolone 0.1% topical ointment: 1 jonathan, top, tid, Instructions: apply a thin film  to affected area  Area needs to be dry, # 60 gm, 1 Refill(s), Type: Maintenance, Pharmacy: [id number], 1 jonathan top tid,Instr:apply a thin film; to affected area; Area needs to be dry  Documented Medications  Documented  Aleve 220 mg oral capsule: = 1 cap(s) ( 220 mg ), Oral, q12 hrs, 0 Refill(s), Type: Maintenance  Daily Multiple Vitamins: PO, Daily, 0  Tylenol: PO, PRN: as needed for pain, 0 Refill(s), Type: Maintenance  Vitamin D3 1000 intl units oral capsule: 1 cap(s) ( 1,000 International Unit ), PO, daily, 0 Refill(s), Type: Soft Stop  aspirin 81 mg oral tablet: 1 tab(s) ( 81 mg ), PO, Daily, # 30 tab(s), 0 Refill(s), Type: Maintenance  calcium (as carbonate)-vitamin D 600 mg-125 intl units oral tablet: 1 tab(s), Oral,  tid, 0 Refill(s), Type: Maintenance   Problem list:    All Problems (Selected)  Hyperplastic Colonic Polyp / SNOMED CT 336177187 / Confirmed  Chronic Venous Insufficiency / ICD-9-.81 / Confirmed  CVA, old, cognitive deficits / ICD-9-.0 / Confirmed  Diabetes mellitus with diabetic neuropathy / SNOMED CT 195816031 / Confirmed  EDEMA / ICD-9-.3 / Confirmed  Metabolic Syndrome / ICD-9-.7 / Confirmed  Mixed hyperlipidemia / SNOMED CT 104595695 / Confirmed  OA (Osteoarthritis) / ICD-9-.90 / Confirmed  Obesity / ICD-9-.00 / Probable  Osteoporosis / ICD-9-.00 / Confirmed  Polio / ICD-9-.90 / Confirmed  Diabetes mellitus, type 2 / SNOMED CT 284616611 / Confirmed  Urge incontinence / SNOMED CT 732889813 / Confirmed  Varicose Veins of Legs / ICD-9-.9 / Confirmed      Objective   Vital Signs   9/5/2019 9:15 AM CDT Temperature Tympanic 98.4 DegF    Peripheral Pulse Rate 64 bpm    Pulse Site Radial artery    HR Method Manual    Systolic Blood Pressure 134 mmHg  HI    Diastolic Blood Pressure 74 mmHg    Mean Arterial Pressure 94 mmHg    BP Site Right arm    BP Method Manual    Oxygen Saturation 97 %      Measurements from flowsheet : Measurements   9/5/2019 9:15 AM CDT     Ht/Wt Measurement Refused by Patient?     Yes     General:  Alert and oriented, No acute distress.    Eye:  Pupils are equal, round and reactive to light, Extraocular movements are intact, conjunctiva injected.    HENT:  Normocephalic, Tympanic membranes are clear.    Neck:  Supple, Non-tender.    Respiratory:  Lungs are clear to auscultation.    Cardiovascular:  Normal rate, Regular rhythm.    Integumentary:  Right leg erythematous c/w cellulitis.       Results Review   Results review   Lab results   6/26/2019 11:08 AM CDT Hgb A1c 6.7  HI    Cholesterol 158 mg/dL    Non-    HDL 43 mg/dL  LOW    Chol/HDL Ratio 3.7    LDL 81    Triglyceride 243 mg/dL  HI    U Microalbumin 2.1 mg/dL    Microalbumin  Comment See comment         Impression and Plan   Assessment and Plan:          Diagnosis: Cellulitis of leg (KJK50-YE L03.119).         Course: Not improving.    Orders     Orders (Selected)   Prescriptions  Prescribed  cephalexin 500 mg oral capsule: = 1 cap(s) ( 500 mg ), Oral, q8 hrs, x 7 day(s), # 21 cap(s), 0 Refill(s), Type: Acute, Pharmacy: [facility], 1 cap(s) Oral q8 hrs,x7 day(s).     .    Assessment and Plan:          Diagnosis: Allergic conjunctivitis (ICU23-OI H10.10).         Course: Worsening.    Orders     Orders (Selected)   Prescriptions  Prescribed  azelastine 0.05% ophthalmic solution: 1 drop(s), Eye-Both, bid, PRN: for allergy symptoms, # 6 mL, 0 Refill(s), Type: Acute, Pharmacy: [facility], 1 drop(s) Eye-Both bid,PRN:for allergy symptoms.     .    Assessment and Plan:  Orders     Orders   Requests (Return to Office):  Return to Clinic (Request) (Order): Return in 1 week.     .    Assessment and Plan:  Orders     This is a 25-minute visit with greater than 50% of that time spent on counseling and coordinating care on the above problems.  Counseling included treatment options, diagnostic options and importance of following through along with discussion about referrals.  The patient was interactive, asked questions and verbalized understanding.   .     .

## 2022-02-15 NOTE — NURSING NOTE
Comprehensive Intake Entered On:  12/9/2020 1:41 PM CST    Performed On:  12/9/2020 1:36 PM CST by Rocío Lyles CMA               Summary   Chief Complaint :   Discuss R arm pain d/t fall 5 weeks ago. Doing PT but ongoing pain and finger concerns.    Advance Directive :   Yes   Menstrual Status :   Postmenopausal   Weight Measured :   188 lb(Converted to: 188 lb 0 oz, 85.275 kg)    Height Measured :   64 in(Converted to: 5 ft 4 in, 162.56 cm)    Body Mass Index :   32.27 kg/m2 (HI)    Body Surface Area :   1.96 m2   Systolic Blood Pressure :   102 mmHg   Diastolic Blood Pressure :   62 mmHg   Mean Arterial Pressure :   75 mmHg   Peripheral Pulse Rate :   81 bpm   BP Site :   Right arm   BP Method :   Manual   Temperature Tympanic :   97.4 DegF(Converted to: 36.3 DegC)  (LOW)    Oxygen Saturation :   96 %   Rocío Lyles CMA - 12/9/2020 1:36 PM CST   Health Status   Allergies Verified? :   Yes   Medication History Verified? :   Yes   Immunizations Current :   Yes   Pre-Visit Planning Status :   Completed   Tobacco Use? :   Former smoker   Rocío Lyles CMA - 12/9/2020 1:36 PM CST   Consents   Consent for Immunization Exchange :   Consent Granted   Consent for Immunizations to Providers :   Consent Granted   Rocío Lyles CMA - 12/9/2020 1:36 PM CST   Meds / Allergies   (As Of: 12/9/2020 1:41:58 PM CST)   Allergies (Active)   Latex  Estimated Onset Date:   Unspecified ; Created By:   Ramila Mayes CMA; Reaction Status:   Active ; Category:   Environment ; Substance:   Latex ; Type:   Allergy ; Updated By:   Ramila Mayes CMA; Reviewed Date:   7/14/2020 5:18 PM CDT      opthmolic eye drops  Estimated Onset Date:   Unspecified ; Reactions:   Swelling of eye.. ; Comments:     Comment 1: Gentamycin opthalmic   ; Created By:   Michelle Zhao; Reaction Status:   Active ; Category:   Drug ; Substance:   opthmolic eye drops ; Type:   Allergy ; Updated By:   Michelle Zhao; Source:   Paper Chart ; Reviewed Date:   7/14/2020 5:18 PM  CDT      Vicodin  Estimated Onset Date:   Unspecified ; Reactions:   behavioral disturbances ; Created By:   Elyse Anne RN; Reaction Status:   Active ; Category:   Drug ; Substance:   Vicodin ; Type:   Allergy ; Updated By:   Elyse Anne RN; Reviewed Date:   7/14/2020 5:18 PM CDT      Zocor  Estimated Onset Date:   Unspecified ; Reactions:   Diarrhea ; Created By:   Michelle Zhao; Reaction Status:   Active ; Category:   Drug ; Substance:   Zocor ; Type:   Allergy ; Updated By:   Michelle Zhao; Reviewed Date:   7/14/2020 5:18 PM CDT        Medication List   (As Of: 12/9/2020 1:41:58 PM CST)   Prescription/Discharge Order    aspirin  :   aspirin ; Status:   Prescribed ; Ordered As Mnemonic:   aspirin 81 mg oral delayed release tablet ; Simple Display Line:   81 mg, 1 tab(s), Oral, daily, for 30 day(s), 30 tab(s), 0 Refill(s) ; Ordering Provider:   Richard Conde MD; Catalog Code:   aspirin ; Order Dt/Tm:   10/22/2020 9:22:50 AM CDT          atorvastatin  :   atorvastatin ; Status:   Prescribed ; Ordered As Mnemonic:   atorvastatin 80 mg oral tablet ; Simple Display Line:   1 tab(s), Oral, daily, 90 tab(s), 3 Refill(s) ; Ordering Provider:   Richard Conde MD; Catalog Code:   atorvastatin ; Order Dt/Tm:   12/10/2019 10:31:12 AM CST          azelastine ophthalmic  :   azelastine ophthalmic ; Status:   Prescribed ; Ordered As Mnemonic:   azelastine 0.05% ophthalmic solution ; Simple Display Line:   See Instructions, INSTILL 1 DROP INTO BOTH EYES TWICE A DAY AS NEEDED FOR ALLERGY SYMPTOMS, 18 mL, 2 Refill(s) ; Ordering Provider:   Negrito Abdul MD; Catalog Code:   azelastine ophthalmic ; Order Dt/Tm:   11/12/2019 11:27:28 AM CST          furosemide  :   furosemide ; Status:   Prescribed ; Ordered As Mnemonic:   furosemide 40 mg oral tablet ; Simple Display Line:   1 tab(s), Oral, daily, 90 tab(s), 1 Refill(s) ; Ordering Provider:   Negrito Abdul MD; Catalog Code:   furosemide ; Order Dt/Tm:    11/16/2020 10:36:02 AM CST          metFORMIN  :   metFORMIN ; Status:   Prescribed ; Ordered As Mnemonic:   metFORMIN 500 mg oral tablet ; Simple Display Line:   1 tab(s), Oral, bid, 180 tab(s), 1 Refill(s) ; Ordering Provider:   Negrito Abdul MD; Catalog Code:   metFORMIN ; Order Dt/Tm:   11/16/2020 10:35:43 AM CST          Miscellaneous Prescription  :   Miscellaneous Prescription ; Status:   Prescribed ; Ordered As Mnemonic:   moldable orthotics ; Simple Display Line:   See Instructions, use daily with diabetic shoes, 6 EA, 0 Refill(s) ; Ordering Provider:   Richard Conde MD; Catalog Code:   Miscellaneous Prescription ; Order Dt/Tm:   9/18/2018 1:29:55 PM CDT          Miscellaneous Rx Supply  :   Miscellaneous Rx Supply ; Status:   Prescribed ; Ordered As Mnemonic:   custom fit diabetic shoes ; Simple Display Line:   See Instructions, wear daily for heel pain, neuropathy, venous insufficiency, 2 EA, 0 Refill(s) ; Ordering Provider:   Richard Conde MD; Catalog Code:   Miscellaneous Rx Supply ; Order Dt/Tm:   9/2/2020 5:08:14 PM CDT          Miscellaneous Rx Supply  :   Miscellaneous Rx Supply ; Status:   Prescribed ; Ordered As Mnemonic:   electric recliner and lift chair ; Simple Display Line:   See Instructions, use for sitting and for elevating legs, 1 EA, 0 Refill(s) ; Ordering Provider:   Richard Conde MD; Catalog Code:   Miscellaneous Rx Supply ; Order Dt/Tm:   5/9/2019 11:15:47 AM CDT          Miscellaneous Rx Supply  :   Miscellaneous Rx Supply ; Status:   Prescribed ; Ordered As Mnemonic:   Moldable inserts for diabetic shoes ; Simple Display Line:   See Instructions, use with diabetic shoes, 2 EA, 0 Refill(s) ; Ordering Provider:   Richard Conde MD; Catalog Code:   Miscellaneous Rx Supply ; Order Dt/Tm:   9/2/2020 5:06:50 PM CDT          nystatin topical  :   nystatin topical ; Status:   Prescribed ; Ordered As Mnemonic:   nystatin 100,000 units/g topical cream ; Simple Display Line:    See Instructions, APPLY TO THE AFFECTED AREA(S) THREE TIMES DAILY, 30 gm, 2 Refill(s) ; Ordering Provider:   Koko Abdul MD; Catalog Code:   nystatin topical ; Order Dt/Tm:   10/27/2020 12:33:49 PM CDT          oxybutynin  :   oxybutynin ; Status:   Prescribed ; Ordered As Mnemonic:   Ditropan XL 10 mg/24 hr oral tablet, extended release ; Simple Display Line:   10 mg, 1 tab(s), PO, Daily, 90 tab(s), 3 Refill(s) ; Ordering Provider:   Richard Conde MD; Catalog Code:   oxybutynin ; Order Dt/Tm:   7/14/2020 5:54:43 PM CDT          potassium chloride  :   potassium chloride ; Status:   Prescribed ; Ordered As Mnemonic:   Klor-Con M20 oral tablet, extended release ; Simple Display Line:   20 mEq, 1 tab(s), Oral, bid, 180 tab(s), 3 Refill(s) ; Ordering Provider:   Richard Conde MD; Catalog Code:   potassium chloride ; Order Dt/Tm:   1/20/2020 10:14:30 AM CST            Home Meds    acetaminophen  :   acetaminophen ; Status:   Documented ; Ordered As Mnemonic:   Tylenol ; Simple Display Line:   PO, PRN: as needed for pain ; Catalog Code:   acetaminophen ; Order Dt/Tm:   1/15/2010 1:47:50 PM CST          cholecalciferol  :   cholecalciferol ; Status:   Documented ; Ordered As Mnemonic:   Vitamin D3 1000 intl units oral capsule ; Simple Display Line:   1,000 International Unit, 1 cap(s), PO, daily ; Catalog Code:   cholecalciferol ; Order Dt/Tm:   4/16/2012 11:05:56 AM CDT          naproxen  :   naproxen ; Status:   Documented ; Ordered As Mnemonic:   Aleve 220 mg oral capsule ; Simple Display Line:   220 mg, 1 cap(s), Oral, q12 hrs, 0 Refill(s) ; Catalog Code:   naproxen ; Order Dt/Tm:   3/12/2019 11:51:40 AM CDT            ID Risk Screen   Recent Travel History :   No recent travel   Family Member Travel History :   No recent travel   Other Exposure to Infectious Disease :   Unknown   Rocío Lyles CMA - 12/9/2020 1:36 PM CST   Social History   Social History   (As Of: 12/9/2020 1:41:58 PM CST)    Alcohol:  Denies Alcohol Use      Never   (Last Updated: 11/5/2013 2:39:08 PM CST by Estephanie Hardy LPN)          Tobacco:  Past      Former smoker, quit more than 30 days ago   (Last Updated: 12/9/2020 1:37:22 PM CST by Rocío Lyles CMA)          Electronic Cigarette/Vaping:  Denies Electronic Cigarette Use      Electronic Cigarette Use: Never.   (Last Updated: 12/9/2020 1:37:09 PM CST by Rocío Lyles CMA)          Substance Abuse:  Denies Substance Abuse      (Last Updated: 11/29/2010 12:44:54 PM CST by Mila Brush )         Employment/School:        Retired   (Last Updated: 2/12/2013 3:35:42 PM CST by Fantasma Mcmanus)          Home/Environment:        Marital status: .  3 children.   (Last Updated: 5/8/2013 11:51:09 AM CDT by Michelle Zhao)          Other:        Marital status,    (Last Updated: 11/29/2010 12:46:53 PM CST by Mila Brush)

## 2022-02-15 NOTE — NURSING NOTE
Diabetes Foot Exam Entered On:  1/20/2020 10:32 AM CST    Performed On:  1/20/2020 10:31 AM CST by Kusum Alexander CMA               Diabetes Foot Exam   Foot Exam + Monofilimant TR :   1/20/2020 CST   Foot Exam Risk Assessment TR :   High risk   Exam Date :   Abnormal foot exam   Foot Exam Findings :   Pateint has weakness in right side and was unable to feel on right foot; Left foot was normal   Kusum Alexander CMA - 1/20/2020 10:31 AM CST   DCP GENERIC CODE   Decreased Monofilament Sensation :   Right   Kusum Alexander CMA - 1/20/2020 10:31 AM CST

## 2022-02-15 NOTE — PROGRESS NOTES
Patient:   PAKO BARBER            MRN: [id number]            FIN: [id number]               Age:   79 years     Sex:  Female     :  1940   Associated Diagnoses:   EDEMA; Diabetes mellitus with diabetic neuropathy; Chronic Venous Insufficiency   Author:   Negrito Abdul MD      Visit Information      Date of Service: 2019 10:01 am  Performing Location: [facility]  Encounter#: [id number]      Primary Care Provider (PCP):  Richard Conde MD, NPI# [id number]      Referring Provider:  Negrito Abdul MD    NPI# [id number]      Chief Complaint   2019 10:14 AM CDT   Follow up Cellulitis, legs feels like they are burning      History of Present Illness   Chief complaint reviewed and confirmed with patient.    Patient presents to clinic for a follow-up evaluation of bilateral lower extremity cellulitis. Legs are improving. Continues to have erythema, chronic edema. No warmth. Denies fevers, chills, night sweats, or other infectious symptoms. Continues to wear compression stockings daily. Elevating legs at night and while at rest.   Reports on-going mild burning in legs. Worse in the morning. Improved by elevation.       Review of Systems          Negative, except as stated in HPI.       Health Status   Allergies:    Allergic Reactions (Selected)  Severity Not Documented  Latex (No reactions were documented)  Opthmolic eye drops (Swelling of eye..)  Vicodin (Behavioral disturbances)  Zocor (Diarrhea)   Problem list:    All Problems  Hyperplastic Colonic Polyp / SNOMED CT 360841997 / Confirmed  Chronic Venous Insufficiency / ICD-9-.81 / Confirmed  CVA, old, cognitive deficits / ICD-9-.0 / Confirmed  Diabetes mellitus with diabetic neuropathy / SNOMED CT 283190981 / Confirmed  EDEMA / ICD-9-.3 / Confirmed  Metabolic Syndrome / ICD-9-.7 / Confirmed  Mixed hyperlipidemia / SNOMED CT 927154676 / Confirmed  OA (Osteoarthritis) / ICD-9-.90 /  Confirmed  Obesity / ICD-9-.00 / Probable  Osteoporosis / ICD-9-.00 / Confirmed  Polio / ICD-9-.90 / Confirmed  Diabetes mellitus, type 2 / SNOMED CT 208256805 / Confirmed  Urge incontinence / SNOMED CT 217187602 / Confirmed  Varicose Veins of Legs / ICD-9-.9 / Confirmed  Inactive: Ischial Bursitis / ICD-9-.5  Inactive: Tear of Medial Meniscus of Knee / ICD-9-.0  Resolved: Buttock Pain / ICD-9-.1  Resolved: Fracture of Wrist / ICD-9-.00  Resolved: Inpatient stay / SNOMED CT 791833579  Resolved: Stroke / ICD-9-  Resolved: Tobacco user / ICD-9-.1  Canceled: Diabetes Mellitus / ICD-9-.00  Canceled: Dyslipidemia / ICD-9-.4   Medications:  (Selected)   Prescriptions  Prescribed  Ditropan XL 10 mg/24 hr oral tablet, extended release: = 1 tab(s) ( 10 mg ), PO, Daily, # 90 tab(s), 3 Refill(s), Type: Maintenance, Pharmacy: [facility] [id number] [facility], 1 tab(s) Oral daily  Lasix 40 mg oral tablet: = 1 tab(s) ( 40 mg ), PO, Daily, # 90 tab(s), 3 Refill(s), Type: Maintenance, Pharmacy: [facility] [id number] [facility], 1 tab(s) Oral daily  atorvastatin 80 mg oral tablet: = 1 tab(s), Oral, daily, # 90 tab(s), 3 Refill(s), Type: Maintenance, Pharmacy: [facility] [id number] [facility], 1 tab(s) Oral daily  cephalexin 500 mg oral capsule: = 1 cap(s) ( 500 mg ), Oral, q8 hrs, x 7 day(s), # 21 cap(s), 0 Refill(s), Type: Acute, Pharmacy: [id number], 1 cap(s) Oral q8 hrs,x7 day(s)  custom fit diabetic shoes: custom fit diabetic shoes, See Instructions, Instructions: wear daily for heel pain, neuropathy, venous insufficiency, Supply, # 2 EA, 0 Refill(s), Type: Maintenance, Pharmacy: [facility], wear daily for heel pain, neuropathy, venous insufficiency  electric recliner and lift chair: electric recliner and lift chair, See Instructions, Instructions: use for sitting and for elevating legs, Supply, # 1 EA, 0 Refill(s), Type: Maintenance  metFORMIN 500 mg oral tablet: = 1 tab(s) ( 500 mg  ), PO, BID, # 180 tab(s), 3 Refill(s), Type: Maintenance, Pharmacy: [facility] [id number] [facility], 1 tab(s) Oral bid  moldable orthotics: moldable orthotics, See Instructions, Instructions: use daily with diabetic shoes, Supply, # 6 EA, 0 Refill(s), Type: Maintenance, Pharmacy: [facility], use daily with diabetic shoes  nystatin 100,000 units/g topical cream: 1 jonathan, TOP, TID, # 30 g, 2 Refill(s), Type: Maintenance, Pharmacy: [facility] [id number] [facility], 1 jonathan Topical tid  potassium chloride 20 mEq oral tablet, extended release: = 1 tab(s) ( 20 mEq ), PO, daily, # 90 tab(s), 3 Refill(s), Type: Maintenance, Pharmacy: [facility] [id number] [facility], 1 tab(s) Oral daily  triamcinolone 0.1% topical ointment: 1 jonathan, top, tid, Instructions: apply a thin film  to affected area  Area needs to be dry, # 60 gm, 1 Refill(s), Type: Maintenance, Pharmacy: [id number], 1 jonathan top tid,Instr:apply a thin film; to affected area; Area needs to be dry  Documented Medications  Documented  Aleve 220 mg oral capsule: = 1 cap(s) ( 220 mg ), Oral, q12 hrs, 0 Refill(s), Type: Maintenance  Daily Multiple Vitamins: PO, Daily, 0  Tylenol: PO, PRN: as needed for pain, 0 Refill(s), Type: Maintenance  Vitamin D3 1000 intl units oral capsule: 1 cap(s) ( 1,000 International Unit ), PO, daily, 0 Refill(s), Type: Soft Stop  aspirin 81 mg oral tablet: 1 tab(s) ( 81 mg ), PO, Daily, # 30 tab(s), 0 Refill(s), Type: Maintenance  calcium (as carbonate)-vitamin D 600 mg-125 intl units oral tablet: 1 tab(s), Oral, tid, 0 Refill(s), Type: Maintenance,    Medications          *denotes recorded medication          moldable orthotics: See Instructions, use daily with diabetic shoes, 6 EA, 0 Refill(s).          custom fit diabetic shoes: See Instructions, wear daily for heel pain, neuropathy, venous insufficiency, 2 EA, 0 Refill(s).          electric recliner and lift chair: See Instructions, use for sitting and for elevating legs, 1 EA, 0 Refill(s).          *Tylenol: PO, PRN: as  needed for pain.          *aspirin 81 mg oral tablet: 81 mg, 1 tab(s), PO, Daily, 30 tab(s).          atorvastatin 80 mg oral tablet: 1 tab(s), Oral, daily, 90 tab(s), 3 Refill(s).          *calcium (as carbonate)-vitamin D 600 mg-125 intl units oral tablet: 1 tab(s), Oral, tid, 0 Refill(s).          cephalexin 500 mg oral capsule: 500 mg, 1 cap(s), Oral, q8 hrs, for 7 day(s), 21 cap(s), 0 Refill(s).          *Vitamin D3 1000 intl units oral capsule: 1,000 International Unit, 1 cap(s), PO, daily.          Lasix 40 mg oral tablet: 40 mg, 1 tab(s), PO, Daily, 90 tab(s), 3 Refill(s).          metFORMIN 500 mg oral tablet: 500 mg, 1 tab(s), PO, BID, 180 tab(s), 3 Refill(s).          *Daily Multiple Vitamins: PO, Daily.          *Aleve 220 mg oral capsule: 220 mg, 1 cap(s), Oral, q12 hrs, 0 Refill(s).          nystatin 100,000 units/g topical cream: 1 jonathan, TOP, TID, 30 g, 2 Refill(s).          Ditropan XL 10 mg/24 hr oral tablet, extended release: 10 mg, 1 tab(s), PO, Daily, 90 tab(s), 3 Refill(s).          potassium chloride 20 mEq oral tablet, extended release: 20 mEq, 1 tab(s), PO, daily, 90 tab(s), 3 Refill(s).          triamcinolone 0.1% topical ointment: 1 jonathan, top, tid, apply a thin film  to affected area  Area needs to be dry, 60 gm, 1 Refill(s).          Histories   Past Medical History:    Active  Hyperplastic Colonic Polyp (154110819): Onset in 2004 at 64 years.  OA (Osteoarthritis) (715.90)  Osteoporosis (733.00)  EDEMA (782.3)  Metabolic Syndrome (277.7)  Polio (045.90)  Chronic Venous Insufficiency (459.81)  Varicose Veins of Legs (454.9)  Resolved  Inpatient stay (693445885): Onset on 10/10/2018 at 78 years.  Resolved on 10/13/2018 at 78 years.  Comments:  10/18/2018 CDT 9:42 AM CDT - [facility] [place] - Acute and chronic right-sided weakness.  Fracture of Wrist (814.00): Onset in 2004 at 63 years.  Resolved.  Comments:  5/8/2013 CDT 11:22 AM CDT - Michelle Zaho    5/8/2013 CDT 11:25  AM Michelle Preston  Work-related fall.  Buttock Pain (729.1): Onset in 2004 at 63 years.  Resolved.  Comments:  5/8/2013 CDT 11:24 AM Michelle Preston  Work-related fall.  Chronic left buttock pain since then.  Stroke (436): Onset on 7/14/2003 at 63 years.  Resolved.  Comments:  5/8/2013 CDT 12:03 PM Michelle Preston  Acute CVA with right-sided weakness.  Tobacco user (305.1):  Resolved.  Comments:  5/8/2013 CDT 11:46 AM Michelle Preston  Patient states that she quit in 1989.   Family History:    Heart disease  Mother  Grandmother (M)  Brother  Bladder cancer..  Mother     Procedure history:    Extracapsular cataract extraction and insertion of intraocular lens ([id number]) on 9/15/2016 at 76 Years.  Comments:  10/10/2016 1:01 PM Rachel Morales  Right.  Extracapsular cataract extraction and insertion of intraocular lens ([id number]) on 9/1/2016 at 76 Years.  Comments:  9/8/2016 2:37 PM Rachel Morales  Left.  Colonoscopy ([id number]) on 6/25/2014 at 74 Years.  Comments:  6/25/2014 9:16 AM Demond Mccoy MD  Moderate left diberticulosis. No polyps. Repeat 10 years.  Sclerotherapy of vein of lower limb (7730753289) in 2010 at 70 Years.  Comments:  5/8/2013 11:40 AM Michelle Preston  Left leg in 03/2010.  Right leg in 04/2010.  [facility].  Laser ablation of long saphenous vein ([id number]) on 8/3/2009 at 69 Years.  Comments:  5/8/2013 11:45 AM Michelle Preston  Right great and right accessory saphenous veins.  Fluoroscopically-guided needle placement for injection of ischial bursa on the left. on 10/10/2008 at 68 Years.  Comments:  5/8/2013 11:49 AM Michelle Preston  [facility].  Bone density scan ([id number]) on 5/31/2007 at 67 Years.  Comments:  5/8/2013 11:58 AM Michelle Preston  Osteopenia  Fracture of Wrist (814.00) in 2004 at 64 Years.  Comments:  5/8/2013 11:54 AM Michelle Preston  Left  Colonoscopy ([id number]) on 4/1/2004 at 63 Years.  Comments:  8/8/2011 7:35 AM MARTIN  - Rachel Ivan  Repeat in 10 yrs.    10/4/2010 11:58 AM CDT - Antonia DAMON, Demond  diverticulosis. single hyperplastic rectal polyp  Bone density scan ([id number]) in  at 62 Years.  Arthroscopy of knee ([id number]) in  at 60 Years.  Comments:  2013 12:08 PM CDT - Michelle Zhao  Left knee.  Flexible sigmoidoscopy ([id number]) on 1999 at 59 Years.  Arthroscopy of Knee (80.26) in  at 58 Years.   section ([id number]).  Comments:  2013 11:52 AM CDT - Michelle Zhao  1960's    2010 12:42 PM CST - Mila Brush   3, Para 3   Social History:        Alcohol Assessment: Denies Alcohol Use            Never      Tobacco Assessment: Past            Past, Cigarettes, 15 per day.  Started age 19 Years.  Stopped age 44 Years.      Substance Abuse Assessment: Denies Substance Abuse      Employment and Education Assessment            Retired      Home and Environment Assessment            Marital status: .  3 children.      Other Assessment            Marital status,         Physical Examination   Vital Signs   2019 10:14 AM CDT Temperature Tympanic 98.0 DegF    Peripheral Pulse Rate 66 bpm    Pulse Site Radial artery    HR Method Manual    Systolic Blood Pressure 122 mmHg    Diastolic Blood Pressure 70 mmHg    Mean Arterial Pressure 87 mmHg    BP Site Right arm    BP Method Manual    Oxygen Saturation 95 %      Measurements from flowsheet : Measurements   2019 10:14 AM CDT Height Measured - Standard 64 in    Weight Measured - Standard 189.6 lb    BSA 1.97 m2    Body Mass Index 32.54 kg/m2  HI      General:  Alert and oriented, No acute distress.    Respiratory:  Respirations are non-labored.    Integumentary:   Bilateral LE erythema from foot to mid-shin. Right > Left. Improved from prior examination. No warmth. .       Impression and Plan   Diagnosis     EDEMA (SGR15-NU R60.9).     Diabetes mellitus with diabetic neuropathy (HQO79-WK E11.40).     Chronic Venous Insufficiency  (QQS17-AJ I87.2).     Course:  Improving.    Plan:  Cellulitis resolved; chronic LE skin changes and  edema persist.   Patient to continue with daily use of compression stockings and elevation of LE extremities.   .    Patient seen and examined and treated by myself. Note prepared by Kathleen Ocampo, student and reviewed.         Professional Services   Counseling Summary:  This was a 15 minute visit with greater than 50% of that time spent counseling the patient.    Counseling included treatment options, lifestyle changes, prognosis, and current condition.    The patient was interactive, attentive, asked questions, and verbalized understanding.

## 2022-06-16 NOTE — PROCEDURES
Accession Number:       [id number]  PATHOLOGIST:     Olive Adams MD Board Certified in Anatomic Pathology and Clinical Pathology (electronic signature)  A SOURCE:     Skin, upper nose, shave biopsy  A GROSS DESCRIPTION:     See comment       Specimen is received in formalin, labeled with       multiple patient identifier(s) and consists of       two piece(s) from a skin shave biopsy aggregating       to 1.0 x 0.3 x 0.2 cm, irregular in shape and       tan-gray in color. The margins are inked green.       The largest piece is bisected and all pieces are       entirely submitted in one cassette(s).         Gross exam(s) performed at: [address] [address]         : AJITH HOWELL MD  A DIAGNOSIS:     Fibroepithelial polyp (X2).

## 2023-11-14 NOTE — NURSING NOTE
Caller: Soheila Griffin    Relationship: Self    Best call back number: [phone]     What medication are you requesting: LEVOTHYROXINE    What are your current symptoms: THYROID    How long have you been experiencing symptoms:     Have you had these symptoms before:    [x] Yes  [] No    Have you been treated for these symptoms before:   [x] Yes  [] No    If a prescription is needed, what is your preferred pharmacy and phone number: [id number] - [place], [place] - [address] AT [facility] [address]/[facility] & [place] - [phone] [facility] [phone] FX     Additional notes: Synthroid 25 MCG tablet WAS CALLED IN AND INSURANCE WILL ONLY COVER THE GENERIC. PATIENT HAS BEEN OUT OF THIS MEDICATION FOR 2 WEEKS         Comprehensive Intake Entered On:  11/8/2019 2:17 PM CST    Performed On:  11/8/2019 2:00 PM CST by Eliana Sibley MA               Summary   Chief Complaint :   Issues with medicaitons, Swelling in legs    Advance Directive :   Yes   Weight Measured :   188 lb(Converted to: 188 lb 0 oz, 85.28 kg)    Height Measured :   64 in(Converted to: 5 ft 4 in, 162.56 cm)    Body Mass Index :   32.27 kg/m2 (HI)    Body Surface Area :   1.96 m2   Systolic Blood Pressure :   118 mmHg   Diastolic Blood Pressure :   60 mmHg   Mean Arterial Pressure :   79 mmHg   Peripheral Pulse Rate :   76 bpm   BP Site :   Left arm   Pulse Site :   Radial artery   BP Method :   Manual   HR Method :   Manual   Temperature Tympanic :   97.9 DegF(Converted to: 36.6 DegC)    Eliana Sibley MA - 11/8/2019 2:00 PM CST   Health Status   Allergies Verified? :   Yes   Medication History Verified? :   Yes   Immunizations Current :   Yes   Medical History Verified? :   Yes   Pre-Visit Planning Status :   Completed   Tobacco Use? :   Former smoker   Eliana Sibley MA - 11/8/2019 2:00 PM CST   Consents   Consent for Immunization Exchange :   Consent Granted   Consent for Immunizations to Providers :   Consent Granted   Eliana Sibley MA - 11/8/2019 2:00 PM CST   Meds / Allergies   (As Of: 11/8/2019 2:17:53 PM CST)   Allergies (Active)   Latex  Estimated Onset Date:   Unspecified ; Created By:   Ramila Mayes CMA; Reaction Status:   Active ; Category:   Environment ; Substance:   Latex ; Type:   Allergy ; Updated By:   Ramila Mayes CMA; Reviewed Date:   11/8/2019 2:14 PM CST      opthmolic eye drops  Estimated Onset Date:   Unspecified ; Reactions:   Swelling of eye.. ; Comments:     Comment 1: Gentamycin opthalmic   ; Created By:   Michelle Zhao; Reaction Status:   Active ; Category:   Drug ; Substance:   opthmolic eye drops ; Type:   Allergy ; Updated By:   Michelle Zhao; Source:   Paper Chart ; Reviewed Date:   11/8/2019 2:14 PM CST      Vicodin  Estimated  Onset Date:   Unspecified ; Reactions:   behavioral disturbances ; Created By:   Elyse Anne RN; Reaction Status:   Active ; Category:   Drug ; Substance:   Vicodin ; Type:   Allergy ; Updated By:   Elyse Anne RN; Reviewed Date:   11/8/2019 2:14 PM CST      Zocor  Estimated Onset Date:   Unspecified ; Reactions:   Diarrhea ; Created By:   Michelle Zhao; Reaction Status:   Active ; Category:   Drug ; Substance:   Zocor ; Type:   Allergy ; Updated By:   Michelle Zhao; Reviewed Date:   11/8/2019 2:14 PM CST        Medication List   (As Of: 11/8/2019 2:17:53 PM CST)   Prescription/Discharge Order    azelastine ophthalmic  :   azelastine ophthalmic ; Status:   Prescribed ; Ordered As Mnemonic:   azelastine 0.05% ophthalmic solution ; Simple Display Line:   See Instructions, INSTILL 1 DROP INTO BOTH EYES TWICE A DAY AS NEEDED FOR ALLERGY SYMPTOMS, 18 mL, 2 Refill(s) ; Ordering Provider:   Negrito Abdul MD; Catalog Code:   azelastine ophthalmic ; Order Dt/Tm:   11/8/2019 11:36:28 AM CST          nystatin topical  :   nystatin topical ; Status:   Prescribed ; Ordered As Mnemonic:   nystatin 100,000 units/g topical cream ; Simple Display Line:   1 jonathan, TOP, TID, 30 g, 2 Refill(s) ; Ordering Provider:   Negrito Abdul MD; Catalog Code:   nystatin topical ; Order Dt/Tm:   9/5/2019 9:30:35 AM CDT          atorvastatin  :   atorvastatin ; Status:   Prescribed ; Ordered As Mnemonic:   atorvastatin 80 mg oral tablet ; Simple Display Line:   1 tab(s), Oral, daily, 90 tab(s), 3 Refill(s) ; Ordering Provider:   Richard Conde MD; Catalog Code:   atorvastatin ; Order Dt/Tm:   7/2/2019 2:49:11 PM CDT          metFORMIN  :   metFORMIN ; Status:   Prescribed ; Ordered As Mnemonic:   metFORMIN 500 mg oral tablet ; Simple Display Line:   500 mg, 1 tab(s), PO, BID, 180 tab(s), 3 Refill(s) ; Ordering Provider:   Richard Conde MD; Catalog Code:   metFORMIN ; Order Dt/Tm:   7/2/2019 2:49:11 PM CDT          furosemide   :   furosemide ; Status:   Prescribed ; Ordered As Mnemonic:   Lasix 40 mg oral tablet ; Simple Display Line:   40 mg, 1 tab(s), PO, Daily, 90 tab(s), 3 Refill(s) ; Ordering Provider:   Richard Conde MD; Catalog Code:   furosemide ; Order Dt/Tm:   7/2/2019 2:49:10 PM CDT          potassium chloride  :   potassium chloride ; Status:   Prescribed ; Ordered As Mnemonic:   potassium chloride 20 mEq oral tablet, extended release ; Simple Display Line:   20 mEq, 1 tab(s), PO, daily, 90 tab(s), 3 Refill(s) ; Ordering Provider:   Richard Conde MD; Catalog Code:   potassium chloride ; Order Dt/Tm:   7/2/2019 2:49:10 PM CDT          oxybutynin  :   oxybutynin ; Status:   Prescribed ; Ordered As Mnemonic:   Ditropan XL 10 mg/24 hr oral tablet, extended release ; Simple Display Line:   10 mg, 1 tab(s), PO, Daily, 90 tab(s), 3 Refill(s) ; Ordering Provider:   Richard Conde MD; Catalog Code:   oxybutynin ; Order Dt/Tm:   7/2/2019 2:49:09 PM CDT          Miscellaneous Rx Supply  :   Miscellaneous Rx Supply ; Status:   Prescribed ; Ordered As Mnemonic:   electric recliner and lift chair ; Simple Display Line:   See Instructions, use for sitting and for elevating legs, 1 EA, 0 Refill(s) ; Ordering Provider:   Richard Conde MD; Catalog Code:   Miscellaneous Rx Supply ; Order Dt/Tm:   5/9/2019 11:15:47 AM CDT          Miscellaneous Rx Supply  :   Miscellaneous Rx Supply ; Status:   Prescribed ; Ordered As Mnemonic:   custom fit diabetic shoes ; Simple Display Line:   See Instructions, wear daily for heel pain, neuropathy, venous insufficiency, 2 EA, 0 Refill(s) ; Ordering Provider:   Richard Conde MD; Catalog Code:   Miscellaneous Rx Supply ; Order Dt/Tm:   9/18/2018 1:29:36 PM CDT          Miscellaneous Prescription  :   Miscellaneous Prescription ; Status:   Prescribed ; Ordered As Mnemonic:   moldable orthotics ; Simple Display Line:   See Instructions, use daily with diabetic shoes, 6 EA, 0 Refill(s) ; Ordering  Provider:   Richard Conde MD; Catalog Code:   Miscellaneous Prescription ; Order Dt/Tm:   9/18/2018 1:29:55 PM CDT            Home Meds    naproxen  :   naproxen ; Status:   Documented ; Ordered As Mnemonic:   Aleve 220 mg oral capsule ; Simple Display Line:   220 mg, 1 cap(s), Oral, q12 hrs, 0 Refill(s) ; Catalog Code:   naproxen ; Order Dt/Tm:   3/12/2019 11:51:40 AM CDT          calcium-vitamin D  :   calcium-vitamin D ; Status:   Documented ; Ordered As Mnemonic:   calcium (as carbonate)-vitamin D 600 mg-125 intl units oral tablet ; Simple Display Line:   1 tab(s), Oral, tid, 0 Refill(s) ; Catalog Code:   calcium-vitamin D ; Order Dt/Tm:   3/12/2019 11:50:47 AM CDT          cholecalciferol  :   cholecalciferol ; Status:   Documented ; Ordered As Mnemonic:   Vitamin D3 1000 intl units oral capsule ; Simple Display Line:   1,000 International Unit, 1 cap(s), PO, daily ; Catalog Code:   cholecalciferol ; Order Dt/Tm:   4/16/2012 11:05:56 AM CDT          aspirin  :   aspirin ; Status:   Documented ; Ordered As Mnemonic:   aspirin 81 mg oral tablet ; Simple Display Line:   81 mg, 1 tab(s), PO, Daily, 30 tab(s) ; Catalog Code:   aspirin ; Order Dt/Tm:   3/22/2012 10:42:39 AM CDT          acetaminophen  :   acetaminophen ; Status:   Documented ; Ordered As Mnemonic:   Tylenol ; Simple Display Line:   PO, PRN: as needed for pain ; Catalog Code:   acetaminophen ; Order Dt/Tm:   1/15/2010 1:47:50 PM CST          multivitamin  :   multivitamin ; Status:   Documented ; Ordered As Mnemonic:   Daily Multiple Vitamins ; Simple Display Line:   PO, Daily ; Catalog Code:   multivitamin ; Order Dt/Tm:   1/15/2010 1:47:00 PM CST